# Patient Record
Sex: MALE | Race: WHITE | NOT HISPANIC OR LATINO | Employment: UNEMPLOYED | URBAN - NONMETROPOLITAN AREA
[De-identification: names, ages, dates, MRNs, and addresses within clinical notes are randomized per-mention and may not be internally consistent; named-entity substitution may affect disease eponyms.]

---

## 2018-02-28 RX ORDER — CEPHALEXIN 500 MG/1
500 CAPSULE ORAL 2 TIMES DAILY
COMMUNITY
End: 2021-04-02

## 2018-02-28 RX ORDER — BUPRENORPHINE HYDROCHLORIDE AND NALOXONE HYDROCHLORIDE DIHYDRATE 8; 2 MG/1; MG/1
2 TABLET SUBLINGUAL DAILY
COMMUNITY

## 2018-02-28 RX ORDER — SULFAMETHOXAZOLE AND TRIMETHOPRIM 800; 160 MG/1; MG/1
1 TABLET ORAL 2 TIMES DAILY
COMMUNITY
End: 2021-04-02

## 2018-03-06 ENCOUNTER — TELEPHONE (OUTPATIENT)
Dept: SURGERY | Facility: CLINIC | Age: 48
End: 2018-03-06

## 2018-03-06 NOTE — TELEPHONE ENCOUNTER
Patient no showed for his appointment with Dr Conway. Called patient no answer. Called PCP office spoke with Kina and told him patient no showed for appointment.Called patient phone  Family member answered and rescheduled appointment.

## 2018-03-15 ENCOUNTER — OFFICE VISIT (OUTPATIENT)
Dept: SURGERY | Facility: CLINIC | Age: 48
End: 2018-03-15

## 2018-03-15 VITALS
TEMPERATURE: 97.5 F | WEIGHT: 280 LBS | HEART RATE: 122 BPM | SYSTOLIC BLOOD PRESSURE: 124 MMHG | BODY MASS INDEX: 39.2 KG/M2 | DIASTOLIC BLOOD PRESSURE: 71 MMHG | HEIGHT: 71 IN | OXYGEN SATURATION: 98 %

## 2018-03-15 DIAGNOSIS — R10.13 EPIGASTRIC PAIN: ICD-10-CM

## 2018-03-15 DIAGNOSIS — K21.00 GASTROESOPHAGEAL REFLUX DISEASE WITH ESOPHAGITIS: Primary | ICD-10-CM

## 2018-03-15 PROCEDURE — 99203 OFFICE O/P NEW LOW 30 MIN: CPT | Performed by: SURGERY

## 2018-03-15 RX ORDER — GABAPENTIN 800 MG/1
800 TABLET ORAL 2 TIMES DAILY
COMMUNITY
Start: 2018-03-13

## 2018-03-15 RX ORDER — CHLORTHALIDONE 50 MG/1
50 TABLET ORAL DAILY
COMMUNITY
Start: 2018-03-03

## 2018-03-15 RX ORDER — LISINOPRIL 20 MG/1
TABLET ORAL
COMMUNITY
Start: 2018-02-20 | End: 2021-04-16 | Stop reason: ALTCHOICE

## 2018-03-15 NOTE — PROGRESS NOTES
Patient: Timbo Mckeon    YOB: 1970    Date: 03/15/2018    Primary Care Provider: Shayna Goddard NP    Reason for Consultation:Epigastric pain, GERD    Chief Complaint   Patient presents with   • Nausea     Nausea and vomiting       Subjective .     History of present illness:  I saw the patient in the office  today as a consultation for evaluation and treatment of nausea and vomiting.Patient also smokes cigarettes and takes a lot of caffeine.  No significant improvement with open up as all twice a day.  No previous EGD in the last 10 years.  Patient denies rectal bleeding or dark tarry stools.  No weight loss or anemia.  Nausea and vomiting only rarely with certain foods.    The following portions of the patient's history were reviewed and updated as appropriate: allergies, current medications, past family history, past medical history, past social history, past surgical history and problem list.      Review of Systems   Constitutional: Negative for chills, fever and unexpected weight change.   HENT: Negative for trouble swallowing and voice change.    Eyes: Negative for visual disturbance.   Respiratory: Negative for apnea, cough, chest tightness, shortness of breath and wheezing.    Cardiovascular: Negative for chest pain, palpitations and leg swelling.   Gastrointestinal: Positive for nausea and vomiting. Negative for abdominal distention, abdominal pain, anal bleeding, blood in stool, constipation, diarrhea and rectal pain.   Endocrine: Negative for cold intolerance and heat intolerance.   Genitourinary: Negative for difficulty urinating, dysuria, flank pain, scrotal swelling and testicular pain.   Musculoskeletal: Negative for back pain, gait problem and joint swelling.   Skin: Negative for color change, rash and wound.   Neurological: Negative for dizziness, syncope, speech difficulty, weakness, numbness and headaches.   Hematological: Negative for adenopathy. Does not bruise/bleed  easily.   Psychiatric/Behavioral: Negative for confusion. The patient is not nervous/anxious.        History:  Past Medical History:   Diagnosis Date   • Chronic back pain        Past Surgical History:   Procedure Laterality Date   • ANKLE SURGERY Right    • BACK SURGERY  2006   • FOOT SURGERY Right    • SPINE SURGERY         Family History   Problem Relation Age of Onset   • Diabetes Father    • Hypertension Father        Social History   Substance Use Topics   • Smoking status: Current Every Day Smoker   • Smokeless tobacco: Never Used   • Alcohol use No       Allergies:  No Known Allergies    Medications:    Current Outpatient Prescriptions:   •  ADVAIR DISKUS 250-50 MCG/DOSE DISKUS, , Disp: , Rfl:   •  buprenorphine-naloxone (SUBOXONE) 8-2 MG per SL tablet, Place 1 tablet under the tongue Daily., Disp: , Rfl:   •  cephalexin (KEFLEX) 500 MG capsule, Take 500 mg by mouth 2 (Two) Times a Day., Disp: , Rfl:   •  chlorthalidone (HYGROTEN) 50 MG tablet, , Disp: , Rfl:   •  DULERA 100-5 MCG/ACT inhaler, , Disp: , Rfl:   •  gabapentin (NEURONTIN) 800 MG tablet, , Disp: , Rfl:   •  lisinopril (PRINIVIL,ZESTRIL) 20 MG tablet, , Disp: , Rfl:   •  sulfamethoxazole-trimethoprim (BACTRIM DS,SEPTRA DS) 800-160 MG per tablet, Take 1 tablet by mouth 2 (Two) Times a Day., Disp: , Rfl:     Objective     Vital Signs:        Physical Exam:   General Appearance:    Alert, cooperative, in no acute distress   Head:    Normocephalic, without obvious abnormality, atraumatic   Eyes:            Lids and lashes normal, conjunctivae and sclerae normal, no   icterus, no pallor, corneas clear, PERRLA   Ears:    Ears appear intact with no abnormalities noted   Throat:   No oral lesions, no thrush, oral mucosa moist   Neck:   No adenopathy, supple, trachea midline, no thyromegaly, no   carotid bruit, no JVD   Lungs:     Clear to auscultation,respirations regular, even and                  unlabored    Heart:    Regular rhythm and normal rate,  normal S1 and S2, no            murmur, no gallop, no rub, no click   Chest Wall:    No abnormalities observed   Abdomen:     Normal bowel sounds, no masses, no organomegaly, soft        non-tender, non-distended, no guarding, there is evidence of epigastric  tenderness   Extremities:   Moves all extremities well, no edema, no cyanosis, no             redness   Pulses:   Pulses palpable and equal bilaterally   Skin:   No bleeding, bruising or rash   Lymph nodes:   No palpable adenopathy   Neurologic:   Cranial nerves 2 - 12 grossly intact, sensation intact     Results Review:   I reviewed the patient's new clinical results.    Review of Systems was reviewed and confirmed as accurate today.    Assessment/Plan     1. Gastroesophageal reflux disease with esophagitis    2. Epigastric pain        I did have a detailed and extensive discussion with the patient in the office and they understand that they need to undergo upper endoscopy. Full risks and benefits of operative versus nonoperative intervention were discussed with the patient and these include bleeding and esophageal injury. The patient understands, agrees, and wishes to proceed with the surgical treatment plan as mentioned above. The patient had no questions for me at the end of the discussion.       I discussed the patients findings and my recommendations with patient.     Electronically signed by Albert Conway MD  03/16/18 11:38 AM    Scribed for Albert Conway MD by Michelle Milligan. 3/16/2018  1:32 PM

## 2018-03-22 ENCOUNTER — OUTSIDE FACILITY SERVICE (OUTPATIENT)
Dept: SURGERY | Facility: CLINIC | Age: 48
End: 2018-03-22

## 2018-03-22 PROCEDURE — 43239 EGD BIOPSY SINGLE/MULTIPLE: CPT | Performed by: SURGERY

## 2018-04-24 ENCOUNTER — TELEPHONE (OUTPATIENT)
Dept: SURGERY | Facility: CLINIC | Age: 48
End: 2018-04-24

## 2018-04-24 NOTE — TELEPHONE ENCOUNTER
Patient has missed several appt to follow up on EGD I rescheduled him at  05/03/2018 he did not have his gastric emptying study done.  He stated since he started eating pickles he has been better

## 2018-04-30 ENCOUNTER — HOSPITAL ENCOUNTER (OUTPATIENT)
Dept: OTHER | Age: 48
Discharge: HOME | End: 2018-04-30

## 2018-04-30 LAB
A/G RATIO: 1.6 (ref 0.8–2)
ALBUMIN SERPL-MCNC: 4.7 G/DL (ref 3.4–4.8)
ALP BLD-CCNC: 121 U/L (ref 25–100)
ALT SERPL-CCNC: 47 U/L (ref 4–36)
ANION GAP SERPL CALCULATED.3IONS-SCNC: 14 MMOL/L (ref 3–16)
AST SERPL-CCNC: 34 U/L (ref 8–33)
BILIRUB SERPL-MCNC: 0.3 MG/DL (ref 0.3–1.2)
BUN BLDV-MCNC: 10 MG/DL (ref 6–20)
CALCIUM SERPL-MCNC: 9.4 MG/DL (ref 8.5–10.5)
CHLORIDE BLD-SCNC: 96 MMOL/L (ref 98–107)
CO2: 26 MMOL/L (ref 20–30)
CREAT SERPL-MCNC: 0.7 MG/DL (ref 0.4–1.2)
GFR AFRICAN AMERICAN: >59
GFR NON-AFRICAN AMERICAN: >59
GLOBULIN: 2.9 G/DL
GLUCOSE BLD-MCNC: 111 MG/DL (ref 74–106)
HCT VFR BLD CALC: 48 % (ref 40–54)
HEMOGLOBIN: 16 G/DL (ref 13–18)
MCH RBC QN AUTO: 29.7 PG (ref 27–32)
MCHC RBC AUTO-ENTMCNC: 33.3 G/DL (ref 31–35)
MCV RBC AUTO: 89.2 FL (ref 80–100)
PDW BLD-RTO: 13.5 % (ref 11–16)
PLATELET # BLD: 249 K/UL (ref 150–400)
PMV BLD AUTO: 10.1 FL (ref 6–10)
POTASSIUM SERPL-SCNC: 4.5 MMOL/L (ref 3.4–5.1)
RBC # BLD: 5.38 M/UL (ref 4.5–6)
SODIUM BLD-SCNC: 136 MMOL/L (ref 136–145)
TOTAL PROTEIN: 7.6 G/DL (ref 6.4–8.3)
WBC # BLD: 8.9 K/UL (ref 4–11)

## 2018-05-02 LAB
HAV IGM SER IA-ACNC: ABNORMAL
HEPATITIS B CORE IGM ANTIBODY: ABNORMAL
HEPATITIS B SURFACE ANTIGEN INTERPRETATION: ABNORMAL
HEPATITIS C ANTIBODY INTERPRETATION: REACTIVE
HIV AG/AB: NORMAL
HIV ANTIGEN: NORMAL
HIV-1 ANTIBODY: NORMAL
HIV-2 AB: NORMAL

## 2018-05-07 ENCOUNTER — TELEPHONE (OUTPATIENT)
Dept: SURGERY | Facility: CLINIC | Age: 48
End: 2018-05-07

## 2018-05-07 NOTE — TELEPHONE ENCOUNTER
Patient has no showed for several appointments. Per Dr. Conway do not reschedule. Does not want to discharge patient but wishes to not schedule.

## 2019-02-25 ENCOUNTER — HOSPITAL ENCOUNTER (EMERGENCY)
Facility: HOSPITAL | Age: 49
Discharge: HOME | End: 2019-02-25
Attending: EMERGENCY MEDICINE
Payer: COMMERCIAL

## 2019-02-25 VITALS
HEIGHT: 70 IN | DIASTOLIC BLOOD PRESSURE: 77 MMHG | OXYGEN SATURATION: 97 % | TEMPERATURE: 98.3 F | WEIGHT: 250 LBS | BODY MASS INDEX: 35.79 KG/M2 | RESPIRATION RATE: 20 BRPM | SYSTOLIC BLOOD PRESSURE: 133 MMHG | HEART RATE: 115 BPM

## 2019-02-25 DIAGNOSIS — L03.119 CELLULITIS AND ABSCESS OF LEG: Primary | ICD-10-CM

## 2019-02-25 DIAGNOSIS — L02.419 CELLULITIS AND ABSCESS OF LEG: Primary | ICD-10-CM

## 2019-02-25 PROCEDURE — 6370000000 HC RX 637 (ALT 250 FOR IP): Performed by: EMERGENCY MEDICINE

## 2019-02-25 PROCEDURE — 99282 EMERGENCY DEPT VISIT SF MDM: CPT

## 2019-02-25 RX ORDER — RANITIDINE 300 MG/1
300 TABLET ORAL NIGHTLY
COMMUNITY
End: 2020-12-12

## 2019-02-25 RX ORDER — CEPHALEXIN 500 MG/1
500 CAPSULE ORAL 4 TIMES DAILY
Qty: 28 CAPSULE | Refills: 0 | Status: SHIPPED | OUTPATIENT
Start: 2019-02-25 | End: 2019-03-04

## 2019-02-25 RX ORDER — SULFAMETHOXAZOLE AND TRIMETHOPRIM 800; 160 MG/1; MG/1
2 TABLET ORAL 2 TIMES DAILY
Qty: 28 TABLET | Refills: 0 | Status: SHIPPED | OUTPATIENT
Start: 2019-02-25 | End: 2019-03-04

## 2019-02-25 RX ORDER — SULFAMETHOXAZOLE AND TRIMETHOPRIM 800; 160 MG/1; MG/1
2 TABLET ORAL ONCE
Status: COMPLETED | OUTPATIENT
Start: 2019-02-25 | End: 2019-02-25

## 2019-02-25 RX ADMIN — SULFAMETHOXAZOLE AND TRIMETHOPRIM 2 TABLET: 800; 160 TABLET ORAL at 11:08

## 2019-02-25 ASSESSMENT — PAIN SCALES - GENERAL: PAINLEVEL_OUTOF10: 7

## 2019-02-25 ASSESSMENT — PAIN DESCRIPTION - ORIENTATION: ORIENTATION: RIGHT;POSTERIOR

## 2019-02-25 ASSESSMENT — PAIN DESCRIPTION - PROGRESSION: CLINICAL_PROGRESSION: GRADUALLY WORSENING

## 2019-02-25 ASSESSMENT — PAIN DESCRIPTION - LOCATION: LOCATION: KNEE

## 2019-02-25 ASSESSMENT — PAIN DESCRIPTION - FREQUENCY: FREQUENCY: CONTINUOUS

## 2019-02-25 ASSESSMENT — PAIN DESCRIPTION - DESCRIPTORS: DESCRIPTORS: ACHING

## 2019-02-25 ASSESSMENT — PAIN DESCRIPTION - PAIN TYPE: TYPE: ACUTE PAIN

## 2019-03-20 ENCOUNTER — PROCEDURE VISIT (OUTPATIENT)
Dept: ORTHOPEDIC SURGERY | Facility: CLINIC | Age: 49
End: 2019-03-20

## 2019-03-20 DIAGNOSIS — M54.17 LUMBOSACRAL NEURITIS: ICD-10-CM

## 2019-03-20 DIAGNOSIS — G62.9 PERIPHERAL NERVE DISORDER: Primary | ICD-10-CM

## 2019-03-20 DIAGNOSIS — R20.2 PARESTHESIA: ICD-10-CM

## 2019-03-20 PROCEDURE — 95886 MUSC TEST DONE W/N TEST COMP: CPT | Performed by: PHYSICAL THERAPIST

## 2019-03-20 PROCEDURE — 95913 NRV CNDJ TEST 13/> STUDIES: CPT | Performed by: PHYSICAL THERAPIST

## 2019-05-06 ENCOUNTER — APPOINTMENT (OUTPATIENT)
Dept: GENERAL RADIOLOGY | Facility: HOSPITAL | Age: 49
DRG: 603 | End: 2019-05-06
Payer: COMMERCIAL

## 2019-05-06 ENCOUNTER — HOSPITAL ENCOUNTER (EMERGENCY)
Facility: HOSPITAL | Age: 49
Discharge: HOME | DRG: 603 | End: 2019-05-06
Attending: FAMILY MEDICINE
Payer: COMMERCIAL

## 2019-05-06 VITALS
OXYGEN SATURATION: 95 % | HEART RATE: 76 BPM | TEMPERATURE: 97.2 F | BODY MASS INDEX: 40.66 KG/M2 | SYSTOLIC BLOOD PRESSURE: 106 MMHG | WEIGHT: 284 LBS | DIASTOLIC BLOOD PRESSURE: 72 MMHG | HEIGHT: 70 IN | RESPIRATION RATE: 16 BRPM

## 2019-05-06 DIAGNOSIS — L03.114 CELLULITIS OF LEFT UPPER EXTREMITY: ICD-10-CM

## 2019-05-06 DIAGNOSIS — S60.222A CONTUSION OF LEFT HAND, INITIAL ENCOUNTER: Primary | ICD-10-CM

## 2019-05-06 PROCEDURE — 73130 X-RAY EXAM OF HAND: CPT

## 2019-05-06 PROCEDURE — 99283 EMERGENCY DEPT VISIT LOW MDM: CPT

## 2019-05-06 PROCEDURE — 6360000002 HC RX W HCPCS: Performed by: FAMILY MEDICINE

## 2019-05-06 PROCEDURE — 90471 IMMUNIZATION ADMIN: CPT | Performed by: FAMILY MEDICINE

## 2019-05-06 PROCEDURE — 90715 TDAP VACCINE 7 YRS/> IM: CPT | Performed by: FAMILY MEDICINE

## 2019-05-06 RX ORDER — SULFAMETHOXAZOLE AND TRIMETHOPRIM 800; 160 MG/1; MG/1
1 TABLET ORAL 2 TIMES DAILY
Qty: 14 TABLET | Refills: 0 | Status: ON HOLD | OUTPATIENT
Start: 2019-05-06 | End: 2019-05-11 | Stop reason: HOSPADM

## 2019-05-06 RX ORDER — DOXYCYCLINE HYCLATE 100 MG
100 TABLET ORAL 2 TIMES DAILY
Qty: 20 TABLET | Refills: 0 | Status: ON HOLD | OUTPATIENT
Start: 2019-05-06 | End: 2019-05-11 | Stop reason: HOSPADM

## 2019-05-06 RX ADMIN — TETANUS TOXOID, REDUCED DIPHTHERIA TOXOID AND ACELLULAR PERTUSSIS VACCINE, ADSORBED 0.5 ML: 5; 2.5; 8; 8; 2.5 SUSPENSION INTRAMUSCULAR at 13:49

## 2019-05-06 ASSESSMENT — PAIN SCALES - GENERAL: PAINLEVEL_OUTOF10: 9

## 2019-05-06 ASSESSMENT — PAIN DESCRIPTION - ORIENTATION: ORIENTATION: LEFT

## 2019-05-06 ASSESSMENT — PAIN DESCRIPTION - PAIN TYPE: TYPE: ACUTE PAIN

## 2019-05-06 ASSESSMENT — PAIN DESCRIPTION - FREQUENCY: FREQUENCY: CONTINUOUS

## 2019-05-06 ASSESSMENT — PAIN DESCRIPTION - DESCRIPTORS: DESCRIPTORS: CONSTANT

## 2019-05-06 ASSESSMENT — ENCOUNTER SYMPTOMS: COLOR CHANGE: 1

## 2019-05-06 ASSESSMENT — PAIN DESCRIPTION - LOCATION: LOCATION: HAND

## 2019-05-06 ASSESSMENT — PAIN DESCRIPTION - ONSET: ONSET: GRADUAL

## 2019-05-06 ASSESSMENT — PAIN DESCRIPTION - PROGRESSION: CLINICAL_PROGRESSION: GRADUALLY WORSENING

## 2019-05-06 NOTE — ED TRIAGE NOTES
Pt arrival to ER with complaints of left hand pain after hitting himself with a hammer yesterday. Pt has noted swelling to the left thumb.

## 2019-05-06 NOTE — ED NOTES
Reviewed discharge plan with Eliza Whalen. Encouraged him to f/u with Sheri Motley MD and he understood. NAD noted on discharge, gait steady. Reviewed discharge prescription for bactrim and doxycycline. Deepali Mathias states understanding of how and when to take medications.       Electronically signed by Ronnie Aschoff, RN on 5/6/2019 at [address] [place] [address]  05/06/19 1421

## 2019-05-06 NOTE — ED PROVIDER NOTES
[address] [place] [address] Court  eMERGENCY dEPARTMENT eNCOUnter      Pt Name: Anne Barrett  MRN: [id number]  [place] 1970  Date of evaluation: 5/6/2019  Provider: Albert Hernandez MD    [address] [place] [address]       Chief Complaint   Patient presents with    Hand Injury         HISTORY OF PRESENT ILLNESS   (Location/Symptom, Timing/Onset, Context/Setting, Quality, Duration, Modifying Factors, Severity)  Note limiting factors. Anne Barrett is a 54 y.o. male who presents to the emergency department with pain and swelling in the left hand. Patient states that he hit it with a hammer while he was working yesterday and it swelled up. Then he stuck a pin in the area of swelling to try and drain it. Now it is not only swollen but red and hot. Nursing Notes were reviewed. REVIEW OF SYSTEMS    (2-9 systems for level 4, 10 or more forlevel 5)     Review of Systems   Constitutional: Negative for chills and fever. Musculoskeletal: Positive for arthralgias and joint swelling. Skin: Positive for color change and wound. Except as noted above the remainder of the review of systems was reviewed and negative. PAST MEDICAL HISTORY     Past Medical History:   Diagnosis Date    Chronic back pain     Hypertension     IV drug abuse ([facility] [address] 2011    7 years clean         SURGICAL HISTORY       Past Surgical History:   Procedure Laterality Date    BACK SURGERY  2006    FOOT SURGERY Right     FRACTURE SURGERY Right     ankle    SPINE SURGERY           CURRENT MEDICATIONS       Previous Medications    BUPRENORPHINE-NALOXONE (SUBOXONE) 8-2 MG SUBL SL TABLET    Place 1 tablet under the tongue daily. Lord Jaramillo FLUTICASONE-SALMETEROL (ADVAIR) 250-50 MCG/DOSE AEPB    Inhale 1 puff into the lungs every 12 hours    GABAPENTIN (NEURONTIN) 600 MG TABLET    Take 600 mg by mouth 2 times daily.      LISINOPRIL (PRINIVIL;ZESTRIL) 20 MG TABLET    Take 20 mg by mouth daily    RANITIDINE (ZANTAC) 300 MG TABLET Take 300 mg by mouth nightly       ALLERGIES     Patient has no known allergies. FAMILY HISTORY       Family History   Problem Relation Age of Onset    High Blood Pressure Father     Diabetes Father           SOCIAL HISTORY       Social History     Socioeconomic History    Marital status: Single     Spouse name: None    Number of children: None    Years of education: None    Highest education level: None   Occupational History    None   Social Needs    Financial resource strain: None    Food insecurity:     Worry: None     Inability: None    Transportation needs:     Medical: None     Non-medical: None   Tobacco Use    Smoking status: Current Every Day Smoker     Packs/day: 1.00     Years: 8.00     Pack years: 8.00     Types: Cigarettes    Smokeless tobacco: Current User     Types: Snuff   Substance and Sexual Activity    Alcohol use: Yes     Comment: occ    Drug use: No    Sexual activity: None   Lifestyle    Physical activity:     Days per week: None     Minutes per session: None    Stress: None   Relationships    Social connections:     Talks on phone: None     Gets together: None     Attends Cheondoism service: None     Active member of club or organization: None     Attends meetings of clubs or organizations: None     Relationship status: None    Intimate partner violence:     Fear of current or ex partner: None     Emotionally abused: None     Physically abused: None     Forced sexual activity: None   Other Topics Concern    None   Social History Narrative    None       SCREENINGS             PHYSICAL EXAM    (up to 7 for level 4, 8 or more for level 5)     ED Triage Vitals [05/06/19 1231]   BP Temp Temp Source Pulse Resp SpO2 Height Weight   106/72 97.2 °F (36.2 °C) Oral 76 16 95 % 5' 10\" (1.778 m) 284 lb (128.8 kg)       Physical Exam   Constitutional: He is oriented to person, place, and time. He appears well-developed and well-nourished.    Neurological: He is alert and oriented to Discharge 05/06/2019 01:42:48 PM      PATIENT REFERRED TO:  [facility] Emergency Department  [address]   [facility]  [phone]  In 2 days  If symptoms worsen      DISCHARGE MEDICATIONS:  New Prescriptions    DOXYCYCLINE HYCLATE (VIBRA-TABS) 100 MG TABLET    Take 1 tablet by mouth 2 times daily for 10 days    SULFAMETHOXAZOLE-TRIMETHOPRIM (BACTRIM DS;SEPTRA DS) 800-160 MG PER TABLET    Take 1 tablet by mouth 2 times daily for 7 days       (Please note that portions ofthis note were completed with a voice recognition program.  Efforts were made to edit the dictations but occasionally words are mis-transcribed.)    Mable Byers MD(electronically signed)  Attending Emergency Physician          Mable Byers MD  05/06/19 7838

## 2019-05-09 ENCOUNTER — HOSPITAL ENCOUNTER (INPATIENT)
Facility: HOSPITAL | Age: 49
LOS: 2 days | Discharge: HOME | DRG: 603 | End: 2019-05-11
Attending: EMERGENCY MEDICINE | Admitting: INTERNAL MEDICINE
Payer: COMMERCIAL

## 2019-05-09 ENCOUNTER — APPOINTMENT (OUTPATIENT)
Dept: CT IMAGING | Facility: HOSPITAL | Age: 49
DRG: 603 | End: 2019-05-09
Payer: COMMERCIAL

## 2019-05-09 DIAGNOSIS — L03.114 CELLULITIS OF HAND, LEFT: Primary | ICD-10-CM

## 2019-05-09 LAB
A/G RATIO: 1.3 (ref 0.8–2)
ALBUMIN SERPL-MCNC: 4.4 G/DL (ref 3.4–4.8)
ALP BLD-CCNC: 96 U/L (ref 25–100)
ALT SERPL-CCNC: 28 U/L (ref 4–36)
ANION GAP SERPL CALCULATED.3IONS-SCNC: 13 MMOL/L (ref 3–16)
AST SERPL-CCNC: 26 U/L (ref 8–33)
BASOPHILS ABSOLUTE: 0.1 K/UL (ref 0–0.1)
BASOPHILS RELATIVE PERCENT: 0.4 %
BILIRUB SERPL-MCNC: 0.4 MG/DL (ref 0.3–1.2)
BUN BLDV-MCNC: 10 MG/DL (ref 6–20)
CALCIUM SERPL-MCNC: 10.1 MG/DL (ref 8.5–10.5)
CHLORIDE BLD-SCNC: 95 MMOL/L (ref 98–107)
CO2: 26 MMOL/L (ref 20–30)
CREAT SERPL-MCNC: 0.8 MG/DL (ref 0.4–1.2)
EOSINOPHILS ABSOLUTE: 0.4 K/UL (ref 0–0.4)
EOSINOPHILS RELATIVE PERCENT: 2 %
GFR AFRICAN AMERICAN: >59
GFR NON-AFRICAN AMERICAN: >59
GLOBULIN: 3.3 G/DL
GLUCOSE BLD-MCNC: 104 MG/DL (ref 74–106)
HCT VFR BLD CALC: 45.7 % (ref 40–54)
HEMOGLOBIN: 15.7 G/DL (ref 13–18)
IMMATURE GRANULOCYTES #: 0.1 K/UL
IMMATURE GRANULOCYTES %: 0.6 % (ref 0–5)
LYMPHOCYTES ABSOLUTE: 2.4 K/UL (ref 1.5–4)
LYMPHOCYTES RELATIVE PERCENT: 13.1 %
MCH RBC QN AUTO: 30.5 PG (ref 27–32)
MCHC RBC AUTO-ENTMCNC: 34.4 G/DL (ref 31–35)
MCV RBC AUTO: 88.9 FL (ref 80–100)
MONOCYTES ABSOLUTE: 1.7 K/UL (ref 0.2–0.8)
MONOCYTES RELATIVE PERCENT: 9.4 %
NEUTROPHILS ABSOLUTE: 13.5 K/UL (ref 2–7.5)
NEUTROPHILS RELATIVE PERCENT: 74.5 %
PDW BLD-RTO: 13.2 % (ref 11–16)
PLATELET # BLD: 282 K/UL (ref 150–400)
PMV BLD AUTO: 10.5 FL (ref 6–10)
POTASSIUM SERPL-SCNC: 3.7 MMOL/L (ref 3.4–5.1)
RBC # BLD: 5.14 M/UL (ref 4.5–6)
SODIUM BLD-SCNC: 134 MMOL/L (ref 136–145)
TOTAL PROTEIN: 7.7 G/DL (ref 6.4–8.3)
WBC # BLD: 18.1 K/UL (ref 4–11)

## 2019-05-09 PROCEDURE — 36415 COLL VENOUS BLD VENIPUNCTURE: CPT

## 2019-05-09 PROCEDURE — 73201 CT UPPER EXTREMITY W/DYE: CPT

## 2019-05-09 PROCEDURE — 2500000003 HC RX 250 WO HCPCS: Performed by: EMERGENCY MEDICINE

## 2019-05-09 PROCEDURE — 80053 COMPREHEN METABOLIC PANEL: CPT

## 2019-05-09 PROCEDURE — 1200000000 HC SEMI PRIVATE

## 2019-05-09 PROCEDURE — 94760 N-INVAS EAR/PLS OXIMETRY 1: CPT

## 2019-05-09 PROCEDURE — 87040 BLOOD CULTURE FOR BACTERIA: CPT

## 2019-05-09 PROCEDURE — 85025 COMPLETE CBC W/AUTO DIFF WBC: CPT

## 2019-05-09 PROCEDURE — 6360000004 HC RX CONTRAST MEDICATION: Performed by: EMERGENCY MEDICINE

## 2019-05-09 PROCEDURE — 99285 EMERGENCY DEPT VISIT HI MDM: CPT

## 2019-05-09 RX ORDER — SODIUM CHLORIDE 0.9 % (FLUSH) 0.9 %
10 SYRINGE (ML) INJECTION EVERY 12 HOURS SCHEDULED
Status: DISCONTINUED | OUTPATIENT
Start: 2019-05-09 | End: 2019-05-11 | Stop reason: HOSPADM

## 2019-05-09 RX ORDER — GABAPENTIN 300 MG/1
600 CAPSULE ORAL 2 TIMES DAILY
Status: DISCONTINUED | OUTPATIENT
Start: 2019-05-10 | End: 2019-05-11 | Stop reason: HOSPADM

## 2019-05-09 RX ORDER — ONDANSETRON 2 MG/ML
4 INJECTION INTRAMUSCULAR; INTRAVENOUS EVERY 6 HOURS PRN
Status: DISCONTINUED | OUTPATIENT
Start: 2019-05-09 | End: 2019-05-11 | Stop reason: HOSPADM

## 2019-05-09 RX ORDER — BUPRENORPHINE HYDROCHLORIDE AND NALOXONE HYDROCHLORIDE DIHYDRATE 8; 2 MG/1; MG/1
1 TABLET SUBLINGUAL DAILY
Status: DISCONTINUED | OUTPATIENT
Start: 2019-05-10 | End: 2019-05-11 | Stop reason: HOSPADM

## 2019-05-09 RX ORDER — FAMOTIDINE 20 MG/1
20 TABLET, FILM COATED ORAL 2 TIMES DAILY
Status: DISCONTINUED | OUTPATIENT
Start: 2019-05-09 | End: 2019-05-11 | Stop reason: HOSPADM

## 2019-05-09 RX ORDER — SODIUM CHLORIDE 0.9 % (FLUSH) 0.9 %
10 SYRINGE (ML) INJECTION PRN
Status: DISCONTINUED | OUTPATIENT
Start: 2019-05-09 | End: 2019-05-11 | Stop reason: HOSPADM

## 2019-05-09 RX ORDER — CHLORTHALIDONE 25 MG/1
50 TABLET ORAL DAILY
Status: DISCONTINUED | OUTPATIENT
Start: 2019-05-10 | End: 2019-05-11 | Stop reason: HOSPADM

## 2019-05-09 RX ORDER — LIDOCAINE HYDROCHLORIDE AND EPINEPHRINE 10; 10 MG/ML; UG/ML
20 INJECTION, SOLUTION INFILTRATION; PERINEURAL ONCE
Status: DISCONTINUED | OUTPATIENT
Start: 2019-05-09 | End: 2019-05-09

## 2019-05-09 RX ORDER — CLINDAMYCIN PHOSPHATE 600 MG/50ML
600 INJECTION INTRAVENOUS EVERY 6 HOURS
Status: DISCONTINUED | OUTPATIENT
Start: 2019-05-09 | End: 2019-05-09

## 2019-05-09 RX ORDER — LISINOPRIL 20 MG/1
20 TABLET ORAL DAILY
Status: DISCONTINUED | OUTPATIENT
Start: 2019-05-10 | End: 2019-05-11 | Stop reason: HOSPADM

## 2019-05-09 RX ORDER — CHLORTHALIDONE 50 MG/1
50 TABLET ORAL DAILY
COMMUNITY
End: 2022-04-03

## 2019-05-09 RX ADMIN — IOPAMIDOL 100 ML: 755 INJECTION, SOLUTION INTRAVENOUS at 19:26

## 2019-05-09 RX ADMIN — CLINDAMYCIN PHOSPHATE 600 MG: 600 INJECTION, SOLUTION INTRAVENOUS at 20:56

## 2019-05-09 ASSESSMENT — ENCOUNTER SYMPTOMS
EYE REDNESS: 0
STRIDOR: 0
HEMATEMESIS: 0
COUGH: 0
SPUTUM PRODUCTION: 0
ABDOMINAL PAIN: 0
WHEEZING: 0
BACK PAIN: 0
NAUSEA: 0
DIARRHEA: 0
EYE DISCHARGE: 0
VOMITING: 0

## 2019-05-09 ASSESSMENT — PAIN DESCRIPTION - FREQUENCY: FREQUENCY: CONTINUOUS

## 2019-05-09 ASSESSMENT — PAIN DESCRIPTION - ORIENTATION: ORIENTATION: LEFT

## 2019-05-09 ASSESSMENT — PAIN SCALES - GENERAL: PAINLEVEL_OUTOF10: 10

## 2019-05-09 ASSESSMENT — PAIN DESCRIPTION - PAIN TYPE: TYPE: ACUTE PAIN

## 2019-05-09 ASSESSMENT — PAIN DESCRIPTION - LOCATION: LOCATION: HAND

## 2019-05-09 ASSESSMENT — PAIN DESCRIPTION - DESCRIPTORS: DESCRIPTORS: ACHING

## 2019-05-09 NOTE — ED NOTES
Attempted lab draw x2. Pt requested [place] to come draw labs. Carlos Enrique Signs stated it will be a few minutes before she can get down here. Dr. Shivani Dominguez aware.       Alyce Martin RN  05/09/19 4610

## 2019-05-09 NOTE — ED PROVIDER NOTES
[address] [facility] ENCOUNTER      Pt Name: Matt Alan  MRN: [id number]  [place]: 1970  Date of evaluation: 5/9/2019  Provider: Eboni Erickson MD    CHIEF COMPLAINT       Chief Complaint   Patient presents with    Wound Check     Pt was seen here Monday for his hand. Was given 2 antibiotics (doxycyline and Bactrim). Pt states it has gotten more red and now has streaks running though it. Pain has also increased. Wound was caused by hammer. HISTORY OF PRESENT ILLNESS  (Location/Symptom, Timing/Onset, Context/Setting, Quality, Duration, Modifying Factors, Severity.)   Matt Alan is a 54 y.o. male who presents to the emergency department who returns for re exam of left hand wound. States seen here 3 days ago post hitting his left hand with a hammer. Had x-rays done and no fracgture. Taking doxycycline and ?trimethaprim sulfa. Returns with [place] and swelling left hand in area of injury. No feevr or chills. He is not diabetic. He is ight handed. Had tetanus toxoid at time of originl visit. Nursing notes were reviewed. REVIEW OFSYSTEMS    (2-9 systems for level 4, 10 or more for level 5)   ROS:  Review of Systems   Constitution: Negative for chills, decreased appetite, diaphoresis and fever. HENT: Negative for congestion, nosebleeds and stridor. Eyes: Negative for discharge and redness. Cardiovascular: Negative for chest pain. Respiratory: Negative for cough, sputum production and wheezing. Hematologic/Lymphatic: Negative for bleeding problem. Does not bruise/bleed easily. Skin: Negative for itching and rash. Musculoskeletal: Negative for back pain. Pain and swelling over thedorsum of right thumb. Can use although moderate pain, with ROM. Gastrointestinal: Negative for abdominal pain, diarrhea, hematemesis, nausea and vomiting. Genitourinary: Negative for dysuria and flank pain.    Neurological: Negative Non-medical: None   Tobacco Use    Smoking status: Current Every Day Smoker     Packs/day: 1.00     Years: 8.00     Pack years: 8.00     Types: Cigarettes    Smokeless tobacco: Current User     Types: Snuff   Substance and Sexual Activity    Alcohol use: Yes     Comment: occ    Drug use: No    Sexual activity: None   Lifestyle    Physical activity:     Days per week: None     Minutes per session: None    Stress: None   Relationships    Social connections:     Talks on phone: None     Gets together: None     Attends Rastafari service: None     Active member of club or organization: None     Attends meetings of clubs or organizations: None     Relationship status: None    Intimate partner violence:     Fear of current or ex partner: None     Emotionally abused: None     Physically abused: None     Forced sexual activity: None   Other Topics Concern    None   Social History Narrative    None         PHYSICAL EXAM    (up to 7 for level 4, 8 or more for level 5)     ED Triage Vitals [05/09/19 1433]   BP Temp Temp Source Pulse Resp SpO2 Height Weight   129/74 98.2 °F (36.8 °C) Oral 118 16 95 % 5' 10.5\" (1.791 m) 285 lb (129.3 kg)       Physical Exam   Constitutional: He is oriented to person, place, and time. No distress. obese   HENT:   Head: Normocephalic and atraumatic. Eyes: Pupils are equal, round, and reactive to light. EOM are normal.   Cardiovascular: Regular rhythm. tachycardia   Pulmonary/Chest: Effort normal and breath sounds normal.   Abdominal: Soft. Bowel sounds are normal.   Musculoskeletal:   Moderate  Swelling of left thenar emnence, first dorsal interosseous space anddorsum of thumb. Has ecchymosis at site of prior hammer blow. No deformity and has good ROM ofleft thumb with minimal tenderness. FROM all digits and circ and sensation intact. No apparent fluctuance. No tenderness or swelling of wrist.    Neurological: He is alert and oriented to person, place, and time.    Skin: Skin is dry. Capillary refill takes less than 2 seconds. No rash noted. He is not diaphoretic. There is erythema. No pallor. See above   Psychiatric: He has a normal mood and affect. His behavior is normal.   Nursing note and vitals reviewed. DIAGNOSTIC RESULTS     EKG: All EKG's are interpreted by the Emergency Department Physician who either signs or Co-signs this chart in the [address] a cardiologist.        RADIOLOGY:   Non-plain film images such as CT, Ultrasound and MRI are read by the radiologist. Plain radiographic images are visualized and preliminarily interpreted by the emergency physician with the below findings:      ? Radiologist's Report Reviewed:  CT HAND LEFT W CONTRAST   Final Result      1. Cellulitis and subcutaneous soft tissue swelling but no sign of any definable fluid collection or abscess a ring-enhancing fluid collection. 2. No bony fracture or periosteal reaction.  Normal-appearing wrist joint    Hand            ED BEDSIDE ULTRASOUND:   Performed by ED Physician - none    LABS:    I have reviewed and interpreted all of the currently available lab results from this visit (ifapplicable):  Results for orders placed or performed during the hospital encounter of 05/09/19   CBC Auto Differential   Result Value Ref Range    WBC 18.1 (H) 4.0 - 11.0 K/uL    RBC 5.14 4.50 - 6.00 M/uL    Hemoglobin 15.7 13.0 - 18.0 g/dL    Hematocrit 45.7 40.0 - 54.0 %    MCV 88.9 80.0 - 100.0 fL    MCH 30.5 27.0 - 32.0 pg    MCHC 34.4 31.0 - 35.0 g/dL    RDW 13.2 11.0 - 16.0 %    Platelets 664 729 - 598 K/uL    MPV 10.5 (H) 6.0 - 10.0 fL    Neutrophils % 74.5 %    Immature Granulocytes % 0.6 0.0 - 5.0 %    Lymphocytes % 13.1 %    Monocytes % 9.4 %    Eosinophils % 2.0 %    Basophils % 0.4 %    Neutrophils # 13.5 (H) 2.0 - 7.5 K/uL    Immature Granulocytes # 0.1 K/uL    Lymphocytes # 2.4 1.5 - 4.0 K/uL    Monocytes # 1.7 (H) 0.2 - 0.8 K/uL    Eosinophils # 0.4 0.0 - 0.4 K/uL    Basophils # 0.1 0.0 - 0.1 K/uL Comprehensive Metabolic Panel   Result Value Ref Range    Sodium 134 (L) 136 - 145 mmol/L    Potassium 3.7 3.4 - 5.1 mmol/L    Chloride 95 (L) 98 - 107 mmol/L    CO2 26 20 - 30 mmol/L    Anion Gap 13 3 - 16    Glucose 104 74 - 106 mg/dL    BUN 10 6 - 20 mg/dL    CREATININE 0.8 0.4 - 1.2 mg/dL    GFR Non-African American >59 >59    GFR African American >59 >59    Calcium 10.1 8.5 - 10.5 mg/dL    Total Protein 7.7 6.4 - 8.3 g/dL    Alb 4.4 3.4 - 4.8 g/dL    Albumin/Globulin Ratio 1.3 0.8 - 2.0    Total Bilirubin 0.4 0.3 - 1.2 mg/dL    Alkaline Phosphatase 96 25 - 100 U/L    ALT 28 4 - 36 U/L    AST 26 8 - 33 U/L    Globulin 3.3 g/dL        All other labs were within normal range or not returned as of this dictation. EMERGENCY DEPARTMENT COURSE and DIFFERENTIAL DIAGNOSIS/MDM:   Vitals:    Vitals:    05/09/19 1433   BP: 129/74   Pulse: 118   Resp: 16   Temp: 98.2 °F (36.8 °C)   TempSrc: Oral   SpO2: 95%   Weight: 285 lb (129.3 kg)   Height: 5' 10.5\" (1.791 m)   Discussed with , hand surgeon [facility],who states they are on divert unless surgical emergency. Do CT and if pus will accept in transfer and if no pus then admit here for antibiotics. CT without fluid collection. Discussed with patient who seems to understand, agree and is lucid. Discussed with Dr. Sacha Younger, Hosp;italist, who will admit. MEDICATIONS ADMINISTERED IN ED:  Medications   lidocaine-EPINEPHrine 1 percent-1:[id number] injection 20 mL ( Intradermal Canceled Entry 5/9/19 1928)   clindamycin (CLEOCIN) 600 mg in dextrose 5 % 50 mL IVPB (600 mg Intravenous New Bag 5/9/19 2056)   iopamidol (ISOVUE-370) 76 % injection 100 mL (100 mLs Intravenous Given 5/9/19 1926)       The patient will follow-up with their PCP in 1-2 days for reevaluation. If the patient or family members have anyfurther concerns or any worsening symptoms they will return to the ED for reevaluation.          CONSULTS:  None    PROCEDURES:  Procedures    CRITICAL CARE TIME

## 2019-05-09 NOTE — ED NOTES
Carlos Enrique Signs from lab to room to collect lab specimens     Katherine Quintero RN  05/09/19 4232

## 2019-05-09 NOTE — ED NOTES
97 Sahra Costello for pt transfer to [facility].       Yara Lopez RN  05/09/19 4664

## 2019-05-10 PROBLEM — F11.20 OPIOID DEPENDENCE (HCC): Status: ACTIVE | Noted: 2019-05-10

## 2019-05-10 PROBLEM — B18.2 CHRONIC HEPATITIS C WITHOUT HEPATIC COMA (HCC): Status: ACTIVE | Noted: 2019-05-10

## 2019-05-10 LAB
ANION GAP SERPL CALCULATED.3IONS-SCNC: 12 MMOL/L (ref 3–16)
BASOPHILS ABSOLUTE: 0.1 K/UL (ref 0–0.1)
BASOPHILS RELATIVE PERCENT: 0.5 %
BUN BLDV-MCNC: 10 MG/DL (ref 6–20)
CALCIUM SERPL-MCNC: 9.6 MG/DL (ref 8.5–10.5)
CHLORIDE BLD-SCNC: 95 MMOL/L (ref 98–107)
CO2: 29 MMOL/L (ref 20–30)
CREAT SERPL-MCNC: 0.8 MG/DL (ref 0.4–1.2)
EOSINOPHILS ABSOLUTE: 0.6 K/UL (ref 0–0.4)
EOSINOPHILS RELATIVE PERCENT: 4.3 %
GFR AFRICAN AMERICAN: >59
GFR NON-AFRICAN AMERICAN: >59
GLUCOSE BLD-MCNC: 124 MG/DL (ref 74–106)
HCT VFR BLD CALC: 45 % (ref 40–54)
HEMOGLOBIN: 15 G/DL (ref 13–18)
IMMATURE GRANULOCYTES #: 0.1 K/UL
IMMATURE GRANULOCYTES %: 0.8 % (ref 0–5)
LYMPHOCYTES ABSOLUTE: 2.9 K/UL (ref 1.5–4)
LYMPHOCYTES RELATIVE PERCENT: 22.6 %
MCH RBC QN AUTO: 29.8 PG (ref 27–32)
MCHC RBC AUTO-ENTMCNC: 33.3 G/DL (ref 31–35)
MCV RBC AUTO: 89.5 FL (ref 80–100)
MONOCYTES ABSOLUTE: 1.5 K/UL (ref 0.2–0.8)
MONOCYTES RELATIVE PERCENT: 12 %
NEUTROPHILS ABSOLUTE: 7.6 K/UL (ref 2–7.5)
NEUTROPHILS RELATIVE PERCENT: 59.8 %
PDW BLD-RTO: 13.3 % (ref 11–16)
PLATELET # BLD: 266 K/UL (ref 150–400)
PMV BLD AUTO: 10.7 FL (ref 6–10)
POTASSIUM REFLEX MAGNESIUM: 3.8 MMOL/L (ref 3.4–5.1)
RBC # BLD: 5.03 M/UL (ref 4.5–6)
SODIUM BLD-SCNC: 136 MMOL/L (ref 136–145)
TSH SERPL DL<=0.05 MIU/L-ACNC: 3.42 UIU/ML (ref 0.35–5.5)
WBC # BLD: 12.7 K/UL (ref 4–11)

## 2019-05-10 PROCEDURE — 6360000002 HC RX W HCPCS: Performed by: INTERNAL MEDICINE

## 2019-05-10 PROCEDURE — 2580000003 HC RX 258: Performed by: INTERNAL MEDICINE

## 2019-05-10 PROCEDURE — 80048 BASIC METABOLIC PNL TOTAL CA: CPT

## 2019-05-10 PROCEDURE — 84443 ASSAY THYROID STIM HORMONE: CPT

## 2019-05-10 PROCEDURE — 85025 COMPLETE CBC W/AUTO DIFF WBC: CPT

## 2019-05-10 PROCEDURE — 6370000000 HC RX 637 (ALT 250 FOR IP): Performed by: PHYSICIAN ASSISTANT

## 2019-05-10 PROCEDURE — 94760 N-INVAS EAR/PLS OXIMETRY 1: CPT

## 2019-05-10 PROCEDURE — 6370000000 HC RX 637 (ALT 250 FOR IP): Performed by: INTERNAL MEDICINE

## 2019-05-10 PROCEDURE — 36415 COLL VENOUS BLD VENIPUNCTURE: CPT

## 2019-05-10 PROCEDURE — 2500000003 HC RX 250 WO HCPCS: Performed by: INTERNAL MEDICINE

## 2019-05-10 PROCEDURE — 99222 1ST HOSP IP/OBS MODERATE 55: CPT | Performed by: INTERNAL MEDICINE

## 2019-05-10 PROCEDURE — 1200000000 HC SEMI PRIVATE

## 2019-05-10 RX ORDER — NICOTINE 21 MG/24HR
1 PATCH, TRANSDERMAL 24 HOURS TRANSDERMAL DAILY
Status: DISCONTINUED | OUTPATIENT
Start: 2019-05-10 | End: 2019-05-11 | Stop reason: HOSPADM

## 2019-05-10 RX ORDER — ERGOCALCIFEROL (VITAMIN D2) 1250 MCG
50000 CAPSULE ORAL WEEKLY
COMMUNITY

## 2019-05-10 RX ORDER — ACETAMINOPHEN 325 MG/1
650 TABLET ORAL EVERY 6 HOURS PRN
Status: DISCONTINUED | OUTPATIENT
Start: 2019-05-10 | End: 2019-05-11 | Stop reason: HOSPADM

## 2019-05-10 RX ADMIN — SILVER SULFADIAZINE: 10 CREAM TOPICAL at 20:40

## 2019-05-10 RX ADMIN — Medication 10 ML: at 00:28

## 2019-05-10 RX ADMIN — SILVER SULFADIAZINE: 10 CREAM TOPICAL at 00:25

## 2019-05-10 RX ADMIN — Medication 10 ML: at 08:30

## 2019-05-10 RX ADMIN — SILVER SULFADIAZINE: 10 CREAM TOPICAL at 08:30

## 2019-05-10 RX ADMIN — PIPERACILLIN SODIUM,TAZOBACTAM SODIUM 3.38 G: 3; .375 INJECTION, POWDER, FOR SOLUTION INTRAVENOUS at 06:41

## 2019-05-10 RX ADMIN — FAMOTIDINE 20 MG: 20 TABLET, FILM COATED ORAL at 08:29

## 2019-05-10 RX ADMIN — CHLORTHALIDONE 50 MG: 25 TABLET ORAL at 08:29

## 2019-05-10 RX ADMIN — GABAPENTIN 600 MG: 300 CAPSULE ORAL at 08:29

## 2019-05-10 RX ADMIN — FAMOTIDINE 20 MG: 20 TABLET, FILM COATED ORAL at 20:40

## 2019-05-10 RX ADMIN — LISINOPRIL 20 MG: 20 TABLET ORAL at 08:29

## 2019-05-10 RX ADMIN — PIPERACILLIN SODIUM,TAZOBACTAM SODIUM 3.38 G: 3; .375 INJECTION, POWDER, FOR SOLUTION INTRAVENOUS at 15:02

## 2019-05-10 RX ADMIN — PIPERACILLIN SODIUM,TAZOBACTAM SODIUM 3.38 G: 3; .375 INJECTION, POWDER, FOR SOLUTION INTRAVENOUS at 00:25

## 2019-05-10 RX ADMIN — GABAPENTIN 600 MG: 300 CAPSULE ORAL at 15:26

## 2019-05-10 RX ADMIN — FAMOTIDINE 20 MG: 20 TABLET, FILM COATED ORAL at 00:25

## 2019-05-10 NOTE — H&P
History and Physical    Patient:  Christina Oh    CHIEF COMPLAINT:    Redness, swelling, pain left hand    HISTORY OF PRESENT ILLNESS:   The patient is a 54 y.o. male with PMH of chronic back pain, opioid dependence on suboxone, and chronic hep C who presents due to redness, swelling, pain in his left hand with a scabbed wound after hitting himself with a hammer about 5 days ago. He presented to ED on 5/6 and imaging did not reveal fracture. He was given a tetanus shot and doxycyline and bactrim. Patient reports he has been compliant with antibiotics but thought the swelling, redness, and pain were getting worse not better. No fever, sweats, chills. No purulent drainage from the scabbed wound on his hand. He is able to move his hand and fingers without difficulty and sensation intact. Of note He does have history of iv drug abuse in the past but has been clean for about 7 years. He takes suboxone. Unaware he has hepatitis c. Past Medical History:      Diagnosis Date    Chronic back pain     Hypertension     IV drug abuse ([facility] [address] 2011    7 years clean       Past Surgical History:      Procedure Laterality Date    BACK SURGERY  2006    FOOT SURGERY Right     FRACTURE SURGERY Right     ankle    SPINE SURGERY         Medications Prior to Admission:    Prior to Admission medications    Medication Sig Start Date End Date Taking?  Authorizing Provider   chlorthalidone (HYGROTEN) 50 MG tablet Take 50 mg by mouth daily   Yes Historical Provider, MD   sulfamethoxazole-trimethoprim (BACTRIM DS;SEPTRA DS) 800-160 MG per tablet Take 1 tablet by mouth 2 times daily for 7 days 5/6/19 5/13/19 Yes Sterling Lin MD   doxycycline hyclate (VIBRA-TABS) 100 MG tablet Take 1 tablet by mouth 2 times daily for 10 days 5/6/19 5/16/19 Yes Sterling Lin MD   ranitidine (ZANTAC) 300 MG tablet Take 300 mg by mouth nightly   Yes Historical Provider, MD   fluticasone-salmeterol (ADVAIR) 250-50 MCG/DOSE AEPB Inhale 1 puff into distress, no wheezing, rales or rhonchi detected  Cardiovascular:  Normal rate, normal rhythm, no murmurs, no gallops, no rubs, no edema   GI:  Soft, obese, nondistended, normal bowel sounds, nontender, no voluntary guarding  Musculoskeletal:  No cyanosis or obvious acute deformity. Moving all extremities. ROM and sensation intact both hands. Integument:  Warm and dry. Approximately 2x2 cm scabbed wound to base of left thumb with surrounding swelling, redness, and tender to palpation. Neurologic:  Alert & oriented x 3, no apparent focal deficits noted   Psychiatric:  Speech and behavior appropriate         Lab Results   Component Value Date     05/10/2019    K 3.8 05/10/2019    CL 95 (L) 05/10/2019    CO2 29 05/10/2019    BUN 10 05/10/2019    CREATININE 0.8 05/10/2019    GLUCOSE 124 (H) 05/10/2019    CALCIUM 9.6 05/10/2019    PROT 7.7 05/09/2019    LABALBU 4.4 05/09/2019    BILITOT 0.4 05/09/2019    ALKPHOS 96 05/09/2019    AST 26 05/09/2019    ALT 28 05/09/2019    LABGLOM >59 05/10/2019    GFRAA >59 05/10/2019    AGRATIO 1.3 05/09/2019    GLOB 3.3 05/09/2019           Lab Results   Component Value Date    WBC 12.7 (H) 05/10/2019    HGB 15.0 05/10/2019    HCT 45.0 05/10/2019    MCV 89.5 05/10/2019     05/10/2019     CT HAND LEFT W CONTRAST   Final Result      1. Cellulitis and subcutaneous soft tissue swelling but no sign of any definable fluid collection or abscess a ring-enhancing fluid collection. 2. No bony fracture or periosteal reaction. Normal-appearing wrist joint    Hand          Assessment and Plan     Active Hospital Problems    Diagnosis Date Noted    Opioid dependence ([facility] [address] [F11.20]  Continue home suboxone. maynor reviewed. 05/10/2019    Chronic hepatitis C without hepatic coma ([facility] [address] [B18.2]  Patient notified of hep c antibody positive in past. ast and alt normal at this time.  May benefit from GI follow up as outpatient, defer to pcp.  05/10/2019    Cellulitis of left hand [B85.110]  Patient presents due to redness, swelling, pain left hand after hitting himself with a hammer about 5 days ago. Was treated with doxycyline and bactrim this week but per patient swelling and redness not improving with this regimen. CT hand without focal abscess or fracture (see report above). Afebrile. Leukocytosis noted at 18 which improved today to 12.7. Continue IV zosyn and local care with topical silvadene. He did receive tetanus shot after injury this week. Monitor. 05/09/2019    Chronic back pain [M54.9, G89.29]  Continue home gabapentin . maynor reviewed. 08/21/2014     Patient was seen and examined by Dr. Daphnie Del Valle and plan of care reviewed.     Josiane Quach certifies per CMS regulation for 42 .15(a), that the patient may reasonably be expected to be discharged or transferred to a hospital within 96 hours after admission to [address] [place] [address]    Electronically signed by FÉLIX Flanagan on 5/10/2019 at 11:51 AM

## 2019-05-10 NOTE — FLOWSHEET NOTE
05/09/19 2221   Assessment   Charting Type Admission   Neurological   Neuro (WDL) WDL   Level of Consciousness 0   [place] Coma Scale   Eye Opening 4   Best Verbal Response 5   Best Motor Response 6   [place] Coma Scale Score 15   NIH/MNHISS Stroke Scale   NIH/MNIHSS Stroke Scale Assessed No   HEENT   HEENT (WDL) X   Right Eye Impaired vision   Left Eye Impaired vision   Right Ear Impaired hearing   Left Ear Impaired hearing   Nose Intact   Throat Intact   Tongue Pink & moist   Voice Normal   Mucous Membrane Moist;Pink; Intact   Teeth Missing teeth   Respiratory   Respiratory (WDL) WDL   Cardiac   Cardiac (WDL) WDL   Gastrointestinal   Abdominal (WDL) X  (obese)   Abdomen Inspection Rounded   Last BM (including prior to admit) 05/09/19   RUQ Bowel Sounds Active   LUQ Bowel Sounds Active   RLQ Bowel Sounds Active   LLQ Bowel Sounds Active   Peripheral Vascular   Peripheral Vascular (WDL) X   Edema Right lower extremity; Left lower extremity   RLE Edema +1   LLE Edema +1   Skin Color/Condition   Skin Color/Condition (WDL) WDL   Skin Integrity   Skin Integrity (WDL) X   Skin Integrity Redness; Other (Comment)  (trauma/cellulitis)   Location left hand    Musculoskeletal   Musculoskeletal (WDL) WDL   Genitourinary   Genitourinary (WDL) WDL   Flank Tenderness No   Suprapubic Tenderness No   Dysuria No   Wound 05/09/19 Hand Anterior; Left   Date First Assessed/Time First Assessed: 05/09/19 2159   Present on Hospital Admission: Yes  Primary Wound Type: Traumatic  Location: Hand  Wound Location Orientation: Anterior; Left   Wound Traumatic   Dressing Status   (open to air )   Dressing Changed   (none)   Dressing/Treatment Open to air   Wound Cleansed Rinsed/Irrigated with saline   Drainage Amount Scant   Drainage Description Serosanguinous   Odor None   Margins Attached edges   Jenny-wound Assessment Intact; Swelling; Purple;[place]   Psychosocial   Psychosocial (WDL) WDL

## 2019-05-10 NOTE — PROGRESS NOTES
Med rec completed using fill history from [facility]. The only change made to the home med list was adding vitamin d2 50,000 units weekly to the list as the pt recently filled it on 5/2/19. All other medications on the home med list matched the pt's recent fill history with [facility].

## 2019-05-10 NOTE — PROGRESS NOTES
Patient to floor at this time  Patient without acute distress  Wound to left hand from trauma, patient was swinging hammer when he missed and hit his hand   Patient with cellulitis to left hand   Very minimal serosanguinous fluid from wound  Area scabbed and noted to have edema and erythema   Patient states pain level 2-3 at this time  Will continue to monitor

## 2019-05-10 NOTE — ED NOTES
Received call from [facility] and spoke with kalpesh and she was checking on patient status. I informed her that the patient will be admitted here. She stated that she will close the case on the patient.      [address]  05/09/19 0759

## 2019-05-11 VITALS
HEART RATE: 89 BPM | OXYGEN SATURATION: 95 % | DIASTOLIC BLOOD PRESSURE: 78 MMHG | SYSTOLIC BLOOD PRESSURE: 130 MMHG | WEIGHT: 278.13 LBS | RESPIRATION RATE: 18 BRPM | HEIGHT: 71 IN | TEMPERATURE: 97.4 F | BODY MASS INDEX: 38.94 KG/M2

## 2019-05-11 PROBLEM — Z72.0 TOBACCO ABUSE: Status: ACTIVE | Noted: 2019-05-11

## 2019-05-11 PROBLEM — I10 BENIGN ESSENTIAL HTN: Status: ACTIVE | Noted: 2019-05-11

## 2019-05-11 PROCEDURE — 6370000000 HC RX 637 (ALT 250 FOR IP): Performed by: INTERNAL MEDICINE

## 2019-05-11 PROCEDURE — 99238 HOSP IP/OBS DSCHRG MGMT 30/<: CPT | Performed by: PHYSICIAN ASSISTANT

## 2019-05-11 PROCEDURE — 2580000003 HC RX 258: Performed by: INTERNAL MEDICINE

## 2019-05-11 PROCEDURE — 6360000002 HC RX W HCPCS: Performed by: INTERNAL MEDICINE

## 2019-05-11 PROCEDURE — 6370000000 HC RX 637 (ALT 250 FOR IP): Performed by: PHYSICIAN ASSISTANT

## 2019-05-11 RX ORDER — LISINOPRIL 20 MG/1
20 TABLET ORAL DAILY
Qty: 30 TABLET | Refills: 3 | Status: SHIPPED
Start: 2019-05-11 | End: 2020-12-12

## 2019-05-11 RX ORDER — AMOXICILLIN AND CLAVULANATE POTASSIUM 875; 125 MG/1; MG/1
1 TABLET, FILM COATED ORAL 2 TIMES DAILY
Qty: 14 TABLET | Refills: 0 | Status: SHIPPED | OUTPATIENT
Start: 2019-05-11 | End: 2019-05-18

## 2019-05-11 RX ORDER — LISINOPRIL 20 MG/1
10 TABLET ORAL DAILY
Qty: 30 TABLET | Refills: 3 | Status: SHIPPED
Start: 2019-05-11 | End: 2019-05-11 | Stop reason: SDUPTHER

## 2019-05-11 RX ADMIN — GABAPENTIN 600 MG: 300 CAPSULE ORAL at 08:08

## 2019-05-11 RX ADMIN — CHLORTHALIDONE 50 MG: 25 TABLET ORAL at 08:08

## 2019-05-11 RX ADMIN — LISINOPRIL 20 MG: 20 TABLET ORAL at 08:08

## 2019-05-11 RX ADMIN — PIPERACILLIN SODIUM,TAZOBACTAM SODIUM 3.38 G: 3; .375 INJECTION, POWDER, FOR SOLUTION INTRAVENOUS at 00:48

## 2019-05-11 RX ADMIN — PIPERACILLIN SODIUM,TAZOBACTAM SODIUM 3.38 G: 3; .375 INJECTION, POWDER, FOR SOLUTION INTRAVENOUS at 08:09

## 2019-05-11 RX ADMIN — Medication 10 ML: at 08:11

## 2019-05-11 RX ADMIN — FAMOTIDINE 20 MG: 20 TABLET, FILM COATED ORAL at 08:08

## 2019-05-11 RX ADMIN — SILVER SULFADIAZINE: 10 CREAM TOPICAL at 08:10

## 2019-05-11 NOTE — DISCHARGE SUMMARY
C without hepatic coma (HCC) [B18.2]  Hep C Ab positive in 2018 and patient was unaware of this. Discussed test result with him. He does have history of IV drug abuse but has been clean for several years he reports. ALT and AST within normal limits. May benefit from gi referral in future regarding this, will defer to pcp.  05/10/2019    Cellulitis of left hand [L03.114]  Patient presented due to redness, swelling, pain left hand after hitting himself with a hammer about 5 days ago. Was treated with doxycyline and bactrim as outpatient without improvement. Did receive tetanus shot. CT hand without focal abscess or fracture (see report above) and ED physician did discuss the case with hand surgery at [facility]. He has remained afebrile. WBC count improvved from 18 to 12. Treated with Zosyn IV and local care with silvadene while inpatient. Swelling and redness improving although some bruising and swelling noted around the wound related to injury by hammer. Patient is stable for dc home. Will prescribe augmentin. Recommend close follow up with pcp early next week and return to ED with any worsening. 05/09/2019    Chronic back pain [M54.9, G89.29]  Continue home gabapentin. maynor reviewed. 08/21/2014         Vital Signs  Temp: 97.4 °F (36.3 °C)  Pulse: 89  Resp: 18  BP: 130/78(manual)  SpO2: 95 %  O2 Device: None (Room air)       Vital signs reviewed in electronic chart. Physical exam  Constitutional:  Well developed, well nourished, no acute distress  Eyes:  PERRL, no scleral icterus, conjunctiva normal   HENT:  Atraumatic, external ears normal, nose normal, oropharynx moist, no pharyngeal exudates.  Neck- supple, no JVD, no lymphadenopathy  Respiratory:  No respiratory distress, no wheezing, rales or rhonchi detected  Cardiovascular:  Normal rate, normal rhythm, no murmurs, no gallops, no rubs, no edema   GI:  Soft, obese, nondistended, normal bowel sounds, nontender, no voluntary guarding  Musculoskeletal:  No cyanosis or obvious acute deformity. Moving all extremities. ROM and sensation intact both hands. Integument:  Warm and dry. Approximately 2x2 cm scabbed wound to base of left thumb with surrounding swelling, redness, and mild tenderness to palpation - improving from previous    Neurologic:  Alert & oriented x 3, no apparent focal deficits noted   Psychiatric:  Speech and behavior appropriate       Disposition: home  Discharged Condition: Stable  Activity: activity as tolerated  Diet: cardiac diet  Follow Up: Primary Care Physician in 3-5 days. Labs: For convenience and continuity at follow-up the following most recent labs are provided:    CBC:   Lab Results   Component Value Date    WBC 12.7 05/10/2019    HGB 15.0 05/10/2019    HCT 45.0 05/10/2019     05/10/2019       RENAL:   Lab Results   Component Value Date     05/10/2019    K 3.8 05/10/2019    CL 95 05/10/2019    CO2 29 05/10/2019    BUN 10 05/10/2019    CREATININE 0.8 05/10/2019         Discharge Medications:      Current Discharge Medication List           Details   silver sulfADIAZINE (SILVADENE) 1 % cream Apply topically 2 times daily Apply topically daily. Qty: 50 g, Refills: 0      amoxicillin-clavulanate (AUGMENTIN) 875-125 MG per tablet Take 1 tablet by mouth 2 times daily for 7 days  Qty: 14 tablet, Refills: 0              Details   lisinopril (PRINIVIL;ZESTRIL) 20 MG tablet Take 1 tablet by mouth daily  Qty: 30 tablet, Refills: 3              Details   ergocalciferol (ERGOCALCIFEROL) 17061 units capsule Take 50,000 Units by mouth once a week      chlorthalidone (HYGROTEN) 50 MG tablet Take 50 mg by mouth daily      ranitidine (ZANTAC) 300 MG tablet Take 300 mg by mouth nightly      fluticasone-salmeterol (ADVAIR) 250-50 MCG/DOSE AEPB Inhale 1 puff into the lungs every 12 hours      gabapentin (NEURONTIN) 600 MG tablet Take 600 mg by mouth 2 times daily.        buprenorphine-naloxone (SUBOXONE) 8-2 MG SUBL SL tablet Place 1 tablet under the

## 2019-05-11 NOTE — PROGRESS NOTES
Pt discharged at this time. Pt IV removed. Pt educated on need to  medications and keep follow up appointments. Pt left floor ambulatory.

## 2019-05-11 NOTE — PLAN OF CARE
Problem: Falls - Risk of:  Goal: Will remain free from falls  Description  Will remain free from falls  5/11/2019 0905 by Chilango Leon RN  Outcome: Ongoing  5/11/2019 0318 by Anuja Mon RN  Outcome: Ongoing  Goal: Absence of physical injury  Description  Absence of physical injury  5/11/2019 0318 by Anuja Mon RN  Outcome: Ongoing

## 2019-05-15 LAB — BLOOD CULTURE, ROUTINE: NORMAL

## 2019-09-06 ENCOUNTER — APPOINTMENT (OUTPATIENT)
Dept: CT IMAGING | Facility: HOSPITAL | Age: 49
End: 2019-09-06

## 2019-09-06 ENCOUNTER — HOSPITAL ENCOUNTER (EMERGENCY)
Facility: HOSPITAL | Age: 49
Discharge: HOME | End: 2019-09-06
Attending: EMERGENCY MEDICINE | Admitting: EMERGENCY MEDICINE

## 2019-09-06 ENCOUNTER — APPOINTMENT (OUTPATIENT)
Dept: ULTRASOUND IMAGING | Facility: HOSPITAL | Age: 49
End: 2019-09-06

## 2019-09-06 ENCOUNTER — APPOINTMENT (OUTPATIENT)
Dept: GENERAL RADIOLOGY | Facility: HOSPITAL | Age: 49
End: 2019-09-06

## 2019-09-06 VITALS
BODY MASS INDEX: 38.65 KG/M2 | RESPIRATION RATE: 18 BRPM | SYSTOLIC BLOOD PRESSURE: 121 MMHG | DIASTOLIC BLOOD PRESSURE: 83 MMHG | HEART RATE: 88 BPM | WEIGHT: 270 LBS | TEMPERATURE: 98.5 F | OXYGEN SATURATION: 94 % | HEIGHT: 70 IN

## 2019-09-06 DIAGNOSIS — L03.119 CELLULITIS OF UPPER EXTREMITY, UNSPECIFIED LATERALITY: ICD-10-CM

## 2019-09-06 DIAGNOSIS — R56.9 SEIZURE (HCC): Primary | ICD-10-CM

## 2019-09-06 LAB
ALBUMIN SERPL-MCNC: 3.9 G/DL (ref 3.5–5.2)
ALBUMIN/GLOB SERPL: 1.3 G/DL
ALP SERPL-CCNC: 105 U/L (ref 39–117)
ALT SERPL W P-5'-P-CCNC: 31 U/L (ref 1–41)
AMPHET+METHAMPHET UR QL: NEGATIVE
AMPHETAMINES UR QL: NEGATIVE
ANION GAP SERPL CALCULATED.3IONS-SCNC: 16.5 MMOL/L (ref 5–15)
APAP SERPL-MCNC: <5 MCG/ML (ref 10–30)
AST SERPL-CCNC: 26 U/L (ref 1–40)
BACTERIA UR QL AUTO: ABNORMAL /HPF
BARBITURATES UR QL SCN: NEGATIVE
BASOPHILS # BLD AUTO: 0.04 10*3/MM3 (ref 0–0.2)
BASOPHILS NFR BLD AUTO: 0.3 % (ref 0–1.5)
BENZODIAZ UR QL SCN: NEGATIVE
BILIRUB SERPL-MCNC: 0.2 MG/DL (ref 0.2–1.2)
BILIRUB UR QL STRIP: NEGATIVE
BUN BLD-MCNC: 7 MG/DL (ref 6–20)
BUN/CREAT SERPL: 9.6 (ref 7–25)
BUPRENORPHINE SERPL-MCNC: POSITIVE NG/ML
CALCIUM SPEC-SCNC: 8.9 MG/DL (ref 8.6–10.5)
CANNABINOIDS SERPL QL: NEGATIVE
CHLORIDE SERPL-SCNC: 99 MMOL/L (ref 98–107)
CLARITY UR: CLEAR
CO2 SERPL-SCNC: 21.5 MMOL/L (ref 22–29)
COCAINE UR QL: NEGATIVE
COLOR UR: YELLOW
CREAT BLD-MCNC: 0.73 MG/DL (ref 0.76–1.27)
DEPRECATED RDW RBC AUTO: 41.4 FL (ref 37–54)
EOSINOPHIL # BLD AUTO: 0.08 10*3/MM3 (ref 0–0.4)
EOSINOPHIL NFR BLD AUTO: 0.5 % (ref 0.3–6.2)
ERYTHROCYTE [DISTWIDTH] IN BLOOD BY AUTOMATED COUNT: 13.2 % (ref 12.3–15.4)
ETHANOL BLD-MCNC: <10 MG/DL (ref 0–10)
ETHANOL UR QL: <0.01 %
GFR SERPL CREATININE-BSD FRML MDRD: 115 ML/MIN/1.73
GLOBULIN UR ELPH-MCNC: 3.1 GM/DL
GLUCOSE BLD-MCNC: 208 MG/DL (ref 65–99)
GLUCOSE UR STRIP-MCNC: NEGATIVE MG/DL
HCT VFR BLD AUTO: 43.9 % (ref 37.5–51)
HGB BLD-MCNC: 14.9 G/DL (ref 13–17.7)
HGB UR QL STRIP.AUTO: NEGATIVE
HYALINE CASTS UR QL AUTO: ABNORMAL /LPF
IMM GRANULOCYTES # BLD AUTO: 0.1 10*3/MM3 (ref 0–0.05)
IMM GRANULOCYTES NFR BLD AUTO: 0.7 % (ref 0–0.5)
KETONES UR QL STRIP: ABNORMAL
LEUKOCYTE ESTERASE UR QL STRIP.AUTO: NEGATIVE
LIPASE SERPL-CCNC: 16 U/L (ref 13–60)
LYMPHOCYTES # BLD AUTO: 1.23 10*3/MM3 (ref 0.7–3.1)
LYMPHOCYTES NFR BLD AUTO: 8.1 % (ref 19.6–45.3)
MAGNESIUM SERPL-MCNC: 2.4 MG/DL (ref 1.6–2.6)
MCH RBC QN AUTO: 28.9 PG (ref 26.6–33)
MCHC RBC AUTO-ENTMCNC: 33.9 G/DL (ref 31.5–35.7)
MCV RBC AUTO: 85.2 FL (ref 79–97)
METHADONE UR QL SCN: NEGATIVE
MONOCYTES # BLD AUTO: 1.17 10*3/MM3 (ref 0.1–0.9)
MONOCYTES NFR BLD AUTO: 7.7 % (ref 5–12)
MUCOUS THREADS URNS QL MICRO: ABNORMAL /HPF
NEUTROPHILS # BLD AUTO: 12.54 10*3/MM3 (ref 1.7–7)
NEUTROPHILS NFR BLD AUTO: 82.7 % (ref 42.7–76)
NITRITE UR QL STRIP: NEGATIVE
NRBC BLD AUTO-RTO: 0 /100 WBC (ref 0–0.2)
OPIATES UR QL: NEGATIVE
OXYCODONE UR QL SCN: NEGATIVE
PCP UR QL SCN: NEGATIVE
PH UR STRIP.AUTO: <=5 [PH] (ref 5–8)
PLATELET # BLD AUTO: 231 10*3/MM3 (ref 140–450)
PMV BLD AUTO: 10 FL (ref 6–12)
POTASSIUM BLD-SCNC: 3.4 MMOL/L (ref 3.5–5.2)
PROCALCITONIN SERPL-MCNC: 0.1 NG/ML (ref 0.1–0.25)
PROPOXYPH UR QL: NEGATIVE
PROT SERPL-MCNC: 7 G/DL (ref 6–8.5)
PROT UR QL STRIP: ABNORMAL
RBC # BLD AUTO: 5.15 10*6/MM3 (ref 4.14–5.8)
RBC # UR: ABNORMAL /HPF
REF LAB TEST METHOD: ABNORMAL
SALICYLATES SERPL-MCNC: <3 MG/DL
SODIUM BLD-SCNC: 137 MMOL/L (ref 136–145)
SP GR UR STRIP: 1.02 (ref 1–1.03)
SPERM URNS QL MICRO: ABNORMAL /HPF
SQUAMOUS #/AREA URNS HPF: ABNORMAL /HPF
TRICYCLICS UR QL SCN: NEGATIVE
TROPONIN T SERPL-MCNC: <0.01 NG/ML (ref 0–0.03)
UROBILINOGEN UR QL STRIP: ABNORMAL
WBC NRBC COR # BLD: 15.16 10*3/MM3 (ref 3.4–10.8)
WBC UR QL AUTO: ABNORMAL /HPF

## 2019-09-06 PROCEDURE — 99284 EMERGENCY DEPT VISIT MOD MDM: CPT

## 2019-09-06 PROCEDURE — 70450 CT HEAD/BRAIN W/O DYE: CPT

## 2019-09-06 PROCEDURE — 71046 X-RAY EXAM CHEST 2 VIEWS: CPT

## 2019-09-06 PROCEDURE — 80307 DRUG TEST PRSMV CHEM ANLYZR: CPT | Performed by: EMERGENCY MEDICINE

## 2019-09-06 PROCEDURE — 93971 EXTREMITY STUDY: CPT

## 2019-09-06 PROCEDURE — 84484 ASSAY OF TROPONIN QUANT: CPT | Performed by: EMERGENCY MEDICINE

## 2019-09-06 PROCEDURE — 25010000002 LORAZEPAM PER 2 MG: Performed by: EMERGENCY MEDICINE

## 2019-09-06 PROCEDURE — 84145 PROCALCITONIN (PCT): CPT | Performed by: EMERGENCY MEDICINE

## 2019-09-06 PROCEDURE — 87086 URINE CULTURE/COLONY COUNT: CPT | Performed by: EMERGENCY MEDICINE

## 2019-09-06 PROCEDURE — 96374 THER/PROPH/DIAG INJ IV PUSH: CPT

## 2019-09-06 PROCEDURE — 85025 COMPLETE CBC W/AUTO DIFF WBC: CPT | Performed by: EMERGENCY MEDICINE

## 2019-09-06 PROCEDURE — 81001 URINALYSIS AUTO W/SCOPE: CPT | Performed by: EMERGENCY MEDICINE

## 2019-09-06 PROCEDURE — 80053 COMPREHEN METABOLIC PANEL: CPT | Performed by: EMERGENCY MEDICINE

## 2019-09-06 PROCEDURE — 83690 ASSAY OF LIPASE: CPT | Performed by: EMERGENCY MEDICINE

## 2019-09-06 PROCEDURE — 93005 ELECTROCARDIOGRAM TRACING: CPT | Performed by: EMERGENCY MEDICINE

## 2019-09-06 PROCEDURE — 83735 ASSAY OF MAGNESIUM: CPT | Performed by: EMERGENCY MEDICINE

## 2019-09-06 PROCEDURE — 80306 DRUG TEST PRSMV INSTRMNT: CPT | Performed by: EMERGENCY MEDICINE

## 2019-09-06 PROCEDURE — 96376 TX/PRO/DX INJ SAME DRUG ADON: CPT

## 2019-09-06 RX ORDER — LORAZEPAM 2 MG/ML
1 INJECTION INTRAMUSCULAR ONCE
Status: COMPLETED | OUTPATIENT
Start: 2019-09-06 | End: 2019-09-06

## 2019-09-06 RX ORDER — SODIUM CHLORIDE 0.9 % (FLUSH) 0.9 %
10 SYRINGE (ML) INJECTION AS NEEDED
Status: DISCONTINUED | OUTPATIENT
Start: 2019-09-06 | End: 2019-09-06

## 2019-09-06 RX ORDER — DOXYCYCLINE 100 MG/1
100 CAPSULE ORAL 2 TIMES DAILY
Qty: 14 CAPSULE | Refills: 0 | Status: SHIPPED | OUTPATIENT
Start: 2019-09-06 | End: 2019-09-13

## 2019-09-06 RX ADMIN — SODIUM CHLORIDE 1000 ML: 9 INJECTION, SOLUTION INTRAVENOUS at 06:04

## 2019-09-06 RX ADMIN — LORAZEPAM 1 MG: 2 INJECTION, SOLUTION INTRAMUSCULAR; INTRAVENOUS at 04:32

## 2019-09-06 RX ADMIN — SODIUM CHLORIDE 2000 ML: 9 INJECTION, SOLUTION INTRAVENOUS at 04:32

## 2019-09-06 RX ADMIN — LORAZEPAM 1 MG: 2 INJECTION, SOLUTION INTRAMUSCULAR; INTRAVENOUS at 06:07

## 2019-09-07 LAB — BACTERIA SPEC AEROBE CULT: NORMAL

## 2019-12-06 ENCOUNTER — HOSPITAL ENCOUNTER (EMERGENCY)
Facility: HOSPITAL | Age: 49
Discharge: HOME | End: 2019-12-06
Attending: EMERGENCY MEDICINE
Payer: COMMERCIAL

## 2019-12-06 ENCOUNTER — APPOINTMENT (OUTPATIENT)
Dept: GENERAL RADIOLOGY | Facility: HOSPITAL | Age: 49
End: 2019-12-06
Payer: COMMERCIAL

## 2019-12-06 VITALS
HEIGHT: 71 IN | DIASTOLIC BLOOD PRESSURE: 81 MMHG | OXYGEN SATURATION: 94 % | BODY MASS INDEX: 36.68 KG/M2 | SYSTOLIC BLOOD PRESSURE: 118 MMHG | TEMPERATURE: 98.3 F | RESPIRATION RATE: 19 BRPM | WEIGHT: 262 LBS | HEART RATE: 95 BPM

## 2019-12-06 DIAGNOSIS — S89.91XA RIGHT KNEE INJURY, INITIAL ENCOUNTER: Primary | ICD-10-CM

## 2019-12-06 PROCEDURE — 73562 X-RAY EXAM OF KNEE 3: CPT

## 2019-12-06 PROCEDURE — 99283 EMERGENCY DEPT VISIT LOW MDM: CPT

## 2019-12-06 RX ORDER — FLUTICASONE FUROATE AND VILANTEROL 200; 25 UG/1; UG/1
1 POWDER RESPIRATORY (INHALATION) DAILY
COMMUNITY

## 2019-12-06 ASSESSMENT — PAIN SCALES - GENERAL: PAINLEVEL_OUTOF10: 8

## 2019-12-06 ASSESSMENT — PAIN DESCRIPTION - DESCRIPTORS: DESCRIPTORS: OTHER (COMMENT);SHARP

## 2019-12-06 ASSESSMENT — PAIN DESCRIPTION - FREQUENCY: FREQUENCY: CONTINUOUS

## 2019-12-06 ASSESSMENT — PAIN DESCRIPTION - ORIENTATION: ORIENTATION: RIGHT

## 2019-12-06 ASSESSMENT — PAIN DESCRIPTION - LOCATION: LOCATION: KNEE

## 2019-12-06 ASSESSMENT — PAIN DESCRIPTION - PAIN TYPE: TYPE: ACUTE PAIN

## 2019-12-20 ENCOUNTER — HOSPITAL ENCOUNTER (OUTPATIENT)
Facility: HOSPITAL | Age: 49
Discharge: HOME | End: 2019-12-20
Payer: COMMERCIAL

## 2019-12-20 LAB
A/G RATIO: 1.7 (ref 0.8–2)
ALBUMIN SERPL-MCNC: 4.8 G/DL (ref 3.4–4.8)
ALP BLD-CCNC: 115 U/L (ref 25–100)
ALT SERPL-CCNC: 38 U/L (ref 4–36)
ANION GAP SERPL CALCULATED.3IONS-SCNC: 13 MMOL/L (ref 3–16)
AST SERPL-CCNC: 30 U/L (ref 8–33)
BILIRUB SERPL-MCNC: 0.4 MG/DL (ref 0.3–1.2)
BUN BLDV-MCNC: 9 MG/DL (ref 6–20)
CALCIUM SERPL-MCNC: 10.1 MG/DL (ref 8.5–10.5)
CHLORIDE BLD-SCNC: 96 MMOL/L (ref 98–107)
CO2: 29 MMOL/L (ref 20–30)
CREAT SERPL-MCNC: 0.7 MG/DL (ref 0.4–1.2)
GFR AFRICAN AMERICAN: >59
GFR NON-AFRICAN AMERICAN: >59
GLOBULIN: 2.9 G/DL
GLUCOSE BLD-MCNC: 96 MG/DL (ref 74–106)
HCT VFR BLD CALC: 46.5 % (ref 40–54)
HEMOGLOBIN: 15.8 G/DL (ref 13–18)
MCH RBC QN AUTO: 29.5 PG (ref 27–32)
MCHC RBC AUTO-ENTMCNC: 34 G/DL (ref 31–35)
MCV RBC AUTO: 86.8 FL (ref 80–100)
PDW BLD-RTO: 13.3 % (ref 11–16)
PLATELET # BLD: 334 K/UL (ref 150–400)
PMV BLD AUTO: 9.8 FL (ref 6–10)
POTASSIUM SERPL-SCNC: 4.4 MMOL/L (ref 3.4–5.1)
RBC # BLD: 5.36 M/UL (ref 4.5–6)
SODIUM BLD-SCNC: 138 MMOL/L (ref 136–145)
TOTAL PROTEIN: 7.7 G/DL (ref 6.4–8.3)
WBC # BLD: 15.3 K/UL (ref 4–11)

## 2019-12-20 PROCEDURE — 80053 COMPREHEN METABOLIC PANEL: CPT

## 2019-12-20 PROCEDURE — 87390 HIV-1 AG IA: CPT

## 2019-12-20 PROCEDURE — 36415 COLL VENOUS BLD VENIPUNCTURE: CPT

## 2019-12-20 PROCEDURE — 86702 HIV-2 ANTIBODY: CPT

## 2019-12-20 PROCEDURE — 80074 ACUTE HEPATITIS PANEL: CPT

## 2019-12-20 PROCEDURE — 85027 COMPLETE CBC AUTOMATED: CPT

## 2019-12-20 PROCEDURE — 86701 HIV-1ANTIBODY: CPT

## 2020-01-03 ENCOUNTER — HOSPITAL ENCOUNTER (EMERGENCY)
Facility: HOSPITAL | Age: 50
Discharge: HOME | End: 2020-01-03
Attending: FAMILY MEDICINE
Payer: COMMERCIAL

## 2020-01-03 VITALS
RESPIRATION RATE: 16 BRPM | WEIGHT: 272 LBS | HEART RATE: 94 BPM | OXYGEN SATURATION: 98 % | SYSTOLIC BLOOD PRESSURE: 143 MMHG | BODY MASS INDEX: 38.08 KG/M2 | TEMPERATURE: 97.9 F | DIASTOLIC BLOOD PRESSURE: 87 MMHG | HEIGHT: 71 IN

## 2020-01-03 PROCEDURE — 2500000003 HC RX 250 WO HCPCS: Performed by: FAMILY MEDICINE

## 2020-01-03 PROCEDURE — 6370000000 HC RX 637 (ALT 250 FOR IP): Performed by: FAMILY MEDICINE

## 2020-01-03 PROCEDURE — 10060 I&D ABSCESS SIMPLE/SINGLE: CPT

## 2020-01-03 PROCEDURE — 99282 EMERGENCY DEPT VISIT SF MDM: CPT

## 2020-01-03 RX ORDER — LIDOCAINE HYDROCHLORIDE 10 MG/ML
5 INJECTION, SOLUTION INFILTRATION; PERINEURAL ONCE
Status: COMPLETED | OUTPATIENT
Start: 2020-01-03 | End: 2020-01-03

## 2020-01-03 RX ORDER — SULFAMETHOXAZOLE AND TRIMETHOPRIM 800; 160 MG/1; MG/1
1 TABLET ORAL 2 TIMES DAILY
Qty: 14 TABLET | Refills: 0 | Status: SHIPPED | OUTPATIENT
Start: 2020-01-03 | End: 2020-01-10

## 2020-01-03 RX ORDER — SULFAMETHOXAZOLE AND TRIMETHOPRIM 800; 160 MG/1; MG/1
1 TABLET ORAL ONCE
Status: COMPLETED | OUTPATIENT
Start: 2020-01-03 | End: 2020-01-03

## 2020-01-03 RX ADMIN — SULFAMETHOXAZOLE AND TRIMETHOPRIM 1 TABLET: 800; 160 TABLET ORAL at 18:45

## 2020-01-03 RX ADMIN — LIDOCAINE HYDROCHLORIDE 5 ML: 10 INJECTION, SOLUTION INFILTRATION; PERINEURAL at 18:46

## 2020-01-03 ASSESSMENT — ENCOUNTER SYMPTOMS: COLOR CHANGE: 1

## 2020-01-03 ASSESSMENT — PAIN SCALES - GENERAL
PAINLEVEL_OUTOF10: 4
PAINLEVEL_OUTOF10: 3

## 2020-01-03 ASSESSMENT — PAIN DESCRIPTION - PAIN TYPE: TYPE: ACUTE PAIN

## 2020-01-03 ASSESSMENT — PAIN DESCRIPTION - LOCATION: LOCATION: ARM

## 2020-01-03 NOTE — ED PROVIDER NOTES
[address] [place] [address] Court  eMERGENCY dEPARTMENT eNCOUnter      Pt Name: Barb Fernandez  MRN: [id number]  [place] 1970  Date of evaluation: 1/3/2020  Provider: Drew Small MD    [address] [place] [address]       Chief Complaint   Patient presents with    Abscess         HISTORY OF PRESENT ILLNESS   (Location/Symptom, Timing/Onset, Context/Setting, Quality, Duration, Modifying Factors, Severity)  Note limiting factors. Barb Fernandez is a 54 y.o. male who presents to the emergency department with an abscess in his left antecubital area after a failed drug injection. Nursing Notes were reviewed. REVIEW OF SYSTEMS    (2-9 systems for level 4, 10 or more forlevel 5)     Review of Systems   Constitutional: Negative for fever. Skin: Positive for color change and wound. Except as noted above the remainder of the review of systems was reviewed and negative. PAST MEDICAL HISTORY     Past Medical History:   Diagnosis Date    Chronic back pain     Hypertension     IV drug abuse ([facility] [address] 2011    7 years clean         SURGICAL HISTORY       Past Surgical History:   Procedure Laterality Date    BACK SURGERY  2006    FOOT SURGERY Right     FRACTURE SURGERY Right     ankle    SPINE SURGERY           CURRENT MEDICATIONS       Previous Medications    BUPRENORPHINE-NALOXONE (SUBOXONE) 8-2 MG SUBL SL TABLET    Place 1 tablet under the tongue daily. .    CHLORTHALIDONE (HYGROTEN) 50 MG TABLET    Take 50 mg by mouth daily    ERGOCALCIFEROL (ERGOCALCIFEROL) 33436 UNITS CAPSULE    Take 50,000 Units by mouth once a week    FLUTICASONE FUROATE-VILANTEROL (BREO ELLIPTA) 200-25 MCG/INH AEPB INHALER    Inhale 1 puff into the lungs daily    GABAPENTIN (NEURONTIN) 600 MG TABLET    Take 600 mg by mouth 2 times daily.      LISINOPRIL (PRINIVIL;ZESTRIL) 20 MG TABLET    Take 1 tablet by mouth daily    NONFORMULARY    Indications: antidepressant     RANITIDINE (ZANTAC) 300 MG TABLET    Take 300 mg by mouth nightly       ALLERGIES     Patient has no known allergies.     FAMILY HISTORY       Family History   Problem Relation Age of Onset    High Blood Pressure Father     Diabetes Father           SOCIAL HISTORY       Social History     Socioeconomic History    Marital status: Single     Spouse name: Not on file    Number of children: Not on file    Years of education: Not on file    Highest education level: Not on file   Occupational History    Not on file   Social Needs    Financial resource strain: Not on file    Food insecurity:     Worry: Not on file     Inability: Not on file    Transportation needs:     Medical: Not on file     Non-medical: Not on file   Tobacco Use    Smoking status: Current Every Day Smoker     Packs/day: 1.00     Years: 8.00     Pack years: 8.00     Types: Cigarettes    Smokeless tobacco: Never Used   Substance and Sexual Activity    Alcohol use: Not Currently    Drug use: Yes     Types: Opiates     Sexual activity: Not on file   Lifestyle    Physical activity:     Days per week: Not on file     Minutes per session: Not on file    Stress: Not on file   Relationships    Social connections:     Talks on phone: Not on file     Gets together: Not on file     Attends [employer] service: Not on file     Active member of club or organization: Not on file     Attends meetings of clubs or organizations: Not on file     Relationship status: Not on file    Intimate partner violence:     Fear of current or ex partner: Not on file     Emotionally abused: Not on file     Physically abused: Not on file     Forced sexual activity: Not on file   Other Topics Concern    Not on file   Social History Narrative    Not on file       SCREENINGS             PHYSICAL EXAM    (up to 7 for level 4, 8 or more for level 5)     ED Triage Vitals   BP Temp Temp Source Pulse Resp SpO2 Height Weight   01/03/20 1805 01/03/20 1801 01/03/20 1801 01/03/20 1805 01/03/20 1805 01/03/20 1801 01/03/20 1805 01/03/20 1805   (!) 141/81 97.7 °F (36.5 °C) Oral 110 16 96 % 5' 11\" (1.803 m) 272 lb (123.4 kg)       Physical Exam  Vitals signs and nursing note reviewed. Skin:     Findings: Lesion present. Comments: There is a 4 cm diameter fluctuant nodule in the left antecubital area. Neurological:      Mental Status: He is alert. DIAGNOSTIC RESULTS     EKG: All EKG's are interpreted by the Emergency Department Physician who either signs or Co-signs this chart in the absence of a cardiologist.        RADIOLOGY:   Non-plain film images such as CT, Ultrasound and MRI are read by the radiologist. Plainradiographic images are visualized and preliminarily interpreted by the emergency physician with the below findings:        Interpretation per the Radiologist below, if available at the time of this note:    No orders to display         ED BEDSIDE ULTRASOUND:   Performed by ED Physician - none    LABS:  Labs Reviewed - No data to display    All other labs were within normal range or not returned as of this dictation. EMERGENCY DEPARTMENT COURSE and DIFFERENTIALDIAGNOSIS/MDM:   Vitals:    Vitals:    01/03/20 1801 01/03/20 1805   BP:  (!) 141/81   Pulse:  110   Resp:  16   Temp: 97.7 °F (36.5 °C) 97.9 °F (36.6 °C)   TempSrc: Oral Oral   SpO2: 96% 96%   Weight:  272 lb (123.4 kg)   Height:  5' 11\" (1.803 m)           CRITICALCARE TIME   Total Critical Care time was 0 minutes, excludingseparately reportable procedures. There was a high probabilityof clinically significant/life threatening deterioration in the patient's condition which required my urgent intervention. CONSULTS:  None    PROCEDURES:  After local anesthesia with 2 ML's 1% lidocaine, the wound was incised the #11 blade. A moderate amount of pus was expressed. Adhesions were lysed. A wick was placed and a pack of 4 x 4's and Kerlix wrapped around it. None    FINAL IMPRESSION      1. Abscess    2.  Cellulitis of left upper extremity DISPOSITION/PLAN   DISPOSITION Decision To Discharge 01/03/2020 06:26:18 PM      PATIENT REFERRED TO:  [facility]  [facility]   [facility]  [phone]    If symptoms worsen      DISCHARGE MEDICATIONS:  New Prescriptions    SULFAMETHOXAZOLE-TRIMETHOPRIM (BACTRIM DS;SEPTRA DS) 800-160 MG PER TABLET    Take 1 tablet by mouth 2 times daily for 7 days       (Please note that portions ofthis note were completed with a voice recognition program.  Efforts were made to edit the dictations but occasionally words are mis-transcribed.)    Ira Royal MD(electronically signed)  Attending Emergency Physician          Ira Royal MD  01/03/20 8139

## 2020-01-03 NOTE — ED TRIAGE NOTES
Pt iv drug user, states he missed about a week ago with his injection.   States that he noticed an abscess starting about 3 to 4 days ago

## 2020-01-14 ENCOUNTER — HOSPITAL ENCOUNTER (EMERGENCY)
Facility: HOSPITAL | Age: 50
Discharge: HOME | End: 2020-01-14
Attending: EMERGENCY MEDICINE
Payer: COMMERCIAL

## 2020-01-14 VITALS
SYSTOLIC BLOOD PRESSURE: 155 MMHG | WEIGHT: 268 LBS | BODY MASS INDEX: 37.52 KG/M2 | RESPIRATION RATE: 16 BRPM | HEIGHT: 71 IN | OXYGEN SATURATION: 96 % | DIASTOLIC BLOOD PRESSURE: 88 MMHG | TEMPERATURE: 97.6 F | HEART RATE: 110 BPM

## 2020-01-14 PROCEDURE — 99281 EMR DPT VST MAYX REQ PHY/QHP: CPT

## 2020-01-14 RX ORDER — ONDANSETRON 4 MG/1
4 TABLET, FILM COATED ORAL 3 TIMES DAILY PRN
Qty: 15 TABLET | Refills: 0 | Status: SHIPPED | OUTPATIENT
Start: 2020-01-14 | End: 2020-12-12

## 2020-01-14 RX ORDER — CLINDAMYCIN HYDROCHLORIDE 300 MG/1
300 CAPSULE ORAL 3 TIMES DAILY
Qty: 30 CAPSULE | Refills: 0 | Status: SHIPPED | OUTPATIENT
Start: 2020-01-14 | End: 2020-01-24

## 2020-01-14 ASSESSMENT — ENCOUNTER SYMPTOMS
NAUSEA: 0
TROUBLE SWALLOWING: 0
ABDOMINAL PAIN: 0
BACK PAIN: 0
SINUS PRESSURE: 0
SHORTNESS OF BREATH: 0
EYE REDNESS: 0
EYE PAIN: 0
COUGH: 0
EYE DISCHARGE: 0
DIARRHEA: 0
CHEST TIGHTNESS: 0
CONSTIPATION: 0
VOMITING: 0
WHEEZING: 0
SORE THROAT: 0
RHINORRHEA: 0

## 2020-01-14 NOTE — ED PROVIDER NOTES
[address] [place] [address] Court  eMERGENCY dEPARTMENT eNCOUnter      Pt Name: Neal Graham  MRN: [id number]  [place] 1970  Date of evaluation: 1/14/2020  Provider: Rosendo Randhawa MD    CHIEF COMPLAINT       Chief Complaint   Patient presents with    Insect Bite         HISTORY OF PRESENT ILLNESS   (Location/Symptom, Timing/Onset, Context/Setting, Quality, Duration, Modifying Factors, Severity)  Note limiting factors. Neal Graham is a 54 y.o. male who presents to the emergency department because of insect bite to left lower leg that will not heal up after a month of daily peroxide cleaning and application of neosporin ointment and now area red and infected. Nursing Notes were reviewed. REVIEW OF SYSTEMS    (2-9 systems for level 4, 10 or more forlevel 5)     Review of Systems   Constitutional: Negative for chills and fever. HENT: Negative for congestion, ear pain, postnasal drip, rhinorrhea, sinus pressure, sneezing, sore throat and trouble swallowing. Eyes: Negative for pain, discharge and redness. Respiratory: Negative for cough, chest tightness, shortness of breath and wheezing. Cardiovascular: Negative for chest pain, palpitations and leg swelling. Gastrointestinal: Negative for abdominal pain, constipation, diarrhea, nausea and vomiting. Genitourinary: Negative for dysuria, flank pain, frequency, hematuria and urgency. Musculoskeletal: Negative for back pain, joint swelling and neck pain. Skin: Negative for pallor and rash. Insect bite to left leg. Neurological: Negative for dizziness, syncope, weakness, numbness and headaches. Psychiatric/Behavioral: Negative for confusion and hallucinations. The patient is not nervous/anxious.             PAST MEDICAL HISTORY     Past Medical History:   Diagnosis Date    Chronic back pain     Hypertension     IV drug abuse ([facility] [address] 2011    7 years clean         SURGICAL HISTORY       Past Surgical History: Procedure Laterality Date    BACK SURGERY  2006    FOOT SURGERY Right     FRACTURE SURGERY Right     ankle    SPINE SURGERY           CURRENT MEDICATIONS       Previous Medications    BUPRENORPHINE-NALOXONE (SUBOXONE) 8-2 MG SUBL SL TABLET    Place 1 tablet under the tongue 2 times daily. CHLORTHALIDONE (HYGROTEN) 50 MG TABLET    Take 50 mg by mouth daily    ERGOCALCIFEROL (ERGOCALCIFEROL) 87259 UNITS CAPSULE    Take 50,000 Units by mouth once a week    FLUTICASONE FUROATE-VILANTEROL (BREO ELLIPTA) 200-25 MCG/INH AEPB INHALER    Inhale 1 puff into the lungs daily    GABAPENTIN (NEURONTIN) 600 MG TABLET    Take 600 mg by mouth 2 times daily. LISINOPRIL (PRINIVIL;ZESTRIL) 20 MG TABLET    Take 1 tablet by mouth daily    RANITIDINE (ZANTAC) 300 MG TABLET    Take 300 mg by mouth nightly       ALLERGIES     Patient has no known allergies.     FAMILY HISTORY       Family History   Problem Relation Age of Onset    High Blood Pressure Father     Diabetes Father           SOCIAL HISTORY       Social History     Socioeconomic History    Marital status: Single     Spouse name: None    Number of children: None    Years of education: None    Highest education level: None   Occupational History    None   Social Needs    Financial resource strain: None    Food insecurity:     Worry: None     Inability: None    Transportation needs:     Medical: None     Non-medical: None   Tobacco Use    Smoking status: Current Every Day Smoker     Packs/day: 1.00     Years: 8.00     Pack years: 8.00     Types: Cigarettes    Smokeless tobacco: Never Used   Substance and Sexual Activity    Alcohol use: Not Currently    Drug use: Yes     Types: Opiates     Sexual activity: None   Lifestyle    Physical activity:     Days per week: None     Minutes per session: None    Stress: None   Relationships    Social connections:     Talks on phone: None     Gets together: None     Attends Mormonism service: None     Active member of club or organization: None     Attends meetings of clubs or organizations: None     Relationship status: None    Intimate partner violence:     Fear of current or ex partner: None     Emotionally abused: None     Physically abused: None     Forced sexual activity: None   Other Topics Concern    None   Social History Narrative    None       SCREENINGS             PHYSICAL EXAM    (up to 7 for level 4, 8 or more for level 5)     ED Triage Vitals [01/14/20 1424]   BP Temp Temp Source Pulse Resp SpO2 Height Weight   (!) 155/88 97.6 °F (36.4 °C) Oral 110 16 96 % 5' 11\" (1.803 m) 268 lb (121.6 kg)       Physical Exam  Constitutional:       Appearance: He is well-developed. HENT:      Head: Normocephalic and atraumatic. Eyes:      Conjunctiva/sclera: Conjunctivae normal.      Pupils: Pupils are equal, round, and reactive to light. Neck:      Musculoskeletal: Neck supple. Cardiovascular:      Rate and Rhythm: Normal rate and regular rhythm. Pulmonary:      Effort: Pulmonary effort is normal.      Breath sounds: Normal breath sounds. Abdominal:      General: Bowel sounds are normal.      Palpations: Abdomen is soft. Musculoskeletal: Normal range of motion. Skin:     General: Skin is warm and dry. Comments: 2 cm diameter open infected wound with erythematous base to left lower extremity. Neurological:      Mental Status: He is alert and oriented to person, place, and time.          DIAGNOSTIC RESULTS     EKG: All EKG's are interpreted by the Emergency Department Physician who either signs or Co-signs this chart in the absence of a cardiologist.        RADIOLOGY:   Non-plain film images such as CT, Ultrasound and MRI are read by the radiologist. Plain radiographic images are visualized andpreliminarily interpreted by the emergency physician with the below findings:        Interpretationper the Radiologist below, if available at the time of this note:    No orders to display         ED BEDSIDE

## 2020-01-14 NOTE — ED TRIAGE NOTES
Pt arrival to ER with complaints of what he thinks was a spider bite. Pt states that he now has a hole in his left leg x few weeks. Pt has noted redness, swelling to the left leg.

## 2020-04-19 ENCOUNTER — HOSPITAL ENCOUNTER (EMERGENCY)
Facility: HOSPITAL | Age: 50
Discharge: HOME | End: 2020-04-19
Attending: FAMILY MEDICINE
Payer: COMMERCIAL

## 2020-04-19 VITALS
DIASTOLIC BLOOD PRESSURE: 113 MMHG | SYSTOLIC BLOOD PRESSURE: 190 MMHG | TEMPERATURE: 97.9 F | RESPIRATION RATE: 18 BRPM | OXYGEN SATURATION: 96 % | HEART RATE: 92 BPM | HEIGHT: 71 IN | BODY MASS INDEX: 37.8 KG/M2 | WEIGHT: 270 LBS

## 2020-04-19 PROCEDURE — 6370000000 HC RX 637 (ALT 250 FOR IP): Performed by: FAMILY MEDICINE

## 2020-04-19 PROCEDURE — 99281 EMR DPT VST MAYX REQ PHY/QHP: CPT

## 2020-04-19 RX ORDER — PREDNISONE 20 MG/1
40 TABLET ORAL DAILY
Qty: 10 TABLET | Refills: 0 | Status: SHIPPED | OUTPATIENT
Start: 2020-04-19 | End: 2020-04-24

## 2020-04-19 RX ORDER — DOXYCYCLINE HYCLATE 100 MG/1
100 CAPSULE ORAL ONCE
Status: DISCONTINUED | OUTPATIENT
Start: 2020-04-19 | End: 2020-04-19 | Stop reason: HOSPADM

## 2020-04-19 RX ORDER — SULFAMETHOXAZOLE AND TRIMETHOPRIM 800; 160 MG/1; MG/1
1 TABLET ORAL 2 TIMES DAILY
Qty: 14 TABLET | Refills: 0 | Status: SHIPPED | OUTPATIENT
Start: 2020-04-19 | End: 2020-04-26

## 2020-04-19 RX ORDER — PREDNISONE 20 MG/1
40 TABLET ORAL ONCE
Status: COMPLETED | OUTPATIENT
Start: 2020-04-19 | End: 2020-04-19

## 2020-04-19 RX ADMIN — PREDNISONE 40 MG: 20 TABLET ORAL at 19:05

## 2020-04-19 ASSESSMENT — PAIN DESCRIPTION - ONSET: ONSET: ON-GOING

## 2020-04-19 ASSESSMENT — ENCOUNTER SYMPTOMS
COUGH: 0
SHORTNESS OF BREATH: 0
COLOR CHANGE: 1
WHEEZING: 0

## 2020-04-19 ASSESSMENT — PAIN DESCRIPTION - ORIENTATION: ORIENTATION: RIGHT

## 2020-04-19 ASSESSMENT — PAIN DESCRIPTION - PAIN TYPE: TYPE: ACUTE PAIN

## 2020-04-19 ASSESSMENT — PAIN DESCRIPTION - LOCATION: LOCATION: HAND

## 2020-04-19 ASSESSMENT — PAIN DESCRIPTION - PROGRESSION: CLINICAL_PROGRESSION: GRADUALLY WORSENING

## 2020-04-19 ASSESSMENT — PAIN DESCRIPTION - DESCRIPTORS: DESCRIPTORS: TIGHTNESS

## 2020-04-19 ASSESSMENT — PAIN DESCRIPTION - FREQUENCY: FREQUENCY: CONTINUOUS

## 2020-04-19 ASSESSMENT — PAIN SCALES - GENERAL: PAINLEVEL_OUTOF10: 10

## 2020-04-19 NOTE — ED PROVIDER NOTES
(NEURONTIN) 600 MG TABLET    Take 600 mg by mouth 2 times daily. LISINOPRIL (PRINIVIL;ZESTRIL) 20 MG TABLET    Take 1 tablet by mouth daily    ONDANSETRON (ZOFRAN) 4 MG TABLET    Take 1 tablet by mouth 3 times daily as needed for Nausea or Vomiting    RANITIDINE (ZANTAC) 300 MG TABLET    Take 300 mg by mouth nightly       ALLERGIES     Patient has no known allergies.     FAMILY HISTORY       Family History   Problem Relation Age of Onset    High Blood Pressure Father     Diabetes Father           SOCIAL HISTORY       Social History     Socioeconomic History    Marital status: Single     Spouse name: None    Number of children: None    Years of education: None    Highest education level: None   Occupational History    None   Social Needs    Financial resource strain: None    Food insecurity     Worry: None     Inability: None    Transportation needs     Medical: None     Non-medical: None   Tobacco Use    Smoking status: Current Every Day Smoker     Packs/day: 1.00     Years: 8.00     Pack years: 8.00     Types: Cigarettes    Smokeless tobacco: Never Used   Substance and Sexual Activity    Alcohol use: Not Currently    Drug use: Not Currently    Sexual activity: None   Lifestyle    Physical activity     Days per week: None     Minutes per session: None    Stress: None   Relationships    Social connections     Talks on phone: None     Gets together: None     Attends Islam service: None     Active member of club or organization: None     Attends meetings of clubs or organizations: None     Relationship status: None    Intimate partner violence     Fear of current or ex partner: None     Emotionally abused: None     Physically abused: None     Forced sexual activity: None   Other Topics Concern    None   Social History Narrative    None       SCREENINGS             PHYSICAL EXAM    (up to 7 for level 4, 8 or more for level 5)     ED Triage Vitals [04/19/20 1817]   BP Temp Temp Source Pulse

## 2020-04-19 NOTE — ED NOTES
AVS and Rx reviewed with patient, understanding verbalized. Patient unable to sign AVS due to his right hand swollen. Patient discharged home with no further needs or concerns voiced. Follow up with pcp or return to ED if symptoms worsen.       Ana Mackenzie RN  04/19/20 4311

## 2020-04-21 ENCOUNTER — HOSPITAL ENCOUNTER (EMERGENCY)
Facility: HOSPITAL | Age: 50
Discharge: ANOTHER HOSPITAL | End: 2020-04-21
Attending: EMERGENCY MEDICINE
Payer: COMMERCIAL

## 2020-04-21 VITALS
BODY MASS INDEX: 37.8 KG/M2 | DIASTOLIC BLOOD PRESSURE: 87 MMHG | HEART RATE: 126 BPM | SYSTOLIC BLOOD PRESSURE: 166 MMHG | RESPIRATION RATE: 16 BRPM | TEMPERATURE: 98.2 F | WEIGHT: 270 LBS | OXYGEN SATURATION: 95 % | HEIGHT: 71 IN

## 2020-04-21 PROCEDURE — 99283 EMERGENCY DEPT VISIT LOW MDM: CPT

## 2020-04-21 ASSESSMENT — PAIN SCALES - GENERAL: PAINLEVEL_OUTOF10: 10

## 2020-04-21 NOTE — ED NOTES
Dr. Rl Felipe in to see pt. Explained to pt. That he will have to have surgery on his hand and he will have to go to [facility] for that. Pt. wants to go on to [facility] and to not have any labs,x-ray or IV's here. Pt. is in agreement to go to [facility]. Dr.Zachiarias on phone with [facility] and he has accepted pt's care. Pt.will go POV and have his sister take him.      Adrian Chapman, RN  04/21/20 5358

## 2020-04-21 NOTE — ED PROVIDER NOTES
[address] [facility] [address]  eMERGENCY dEPARTMENT eNCOUnter      Pt Name: Qiana Duffy  MRN: [id number]  YOB: 1970  Date ofevaluation: 2/27/4745  Provider: Last Membreno MD    CHIEF COMPLAINT       Chief Complaint   Patient presents with    Hand Pain     C/o right hand pain,swelling. Was seen here by Jojo Cheek 2 days ago. Pt. was told he had an insect bite. Was told to RTER if sx's worsen. HISTORY OF PRESENT ILLNESS  (Location/Symptom, Timing/Onset, Context/Setting, Quality, Duration, Modifying Factors, Severity.)   Qiana Duffy is a 52 y.o. male who presents to the emergency department with right hand pain, redness and swelling x 6-7 days. He was pulling weeds and then noticed a stinging sensation to his hand a little while later. He never saw anything sting him but told me that he has lots of brown recluses around his house and thinks one of them may have bitten him. He came to the ER on 4-19-20 and was prescribed Bactrim and Prednisone by the ER doc who saw him on that day. He is returning because the infection is much worse. Nursing notes were reviewed. REVIEW OF SYSTEMS    (2-9systems for level 4, 10 or more for level 5)   ROS:  General:  No fevers, no chills, no weakness  HEENT: No sore throat, runny nose or ear pain  Cardiovascular:  No chest pain, no palpitations  Respiratory:  No shortness of breath, no cough, no wheezing  Gastrointestinal:  No pain, no nausea, no vomiting, no diarrhea  Musculoskeletal:  + right hand infection. No muscle pain, no joint pain  Skin:  No rash, no easy bruising  Genitourinary:  No dysuria, no hematuria    Except as noted above theremainder of the review of systems was reviewed and negative.        PASTMEDICAL HISTORY     Past Medical History:   Diagnosis Date    Chronic back pain     Hypertension     IV drug abuse ([facility] [address] 2011    7 years clean         SURGICAL HISTORY       Past Surgical History:   Procedure radiographic images are visualized and preliminarily interpreted by the emergency physician with the below findings:      [] Radiologist's Report Reviewed:  No orders to display         ED BEDSIDE ULTRASOUND:   Performed by ED Physician - none    LABS:  Labs Reviewed - No data to display    I have reviewed and interpreted all of the currently available lab resultsfrom this visit (if applicable):  No results found for this visit on 04/21/20. All other labs were within normal range or not returned as of this dictation. EMERGENCY DEPARTMENT COURSE and DIFFERENTIAL DIAGNOSIS/MDM:   Vitals:    Vitals:    04/21/20 0633   BP: (!) 166/87   Pulse: 126   Resp: 16   Temp: 98.2 °F (36.8 °C)   TempSrc: Oral   SpO2: 95%   Weight: 270 lb (122.5 kg)   Height: 5' 11\" (1.803 m)       Patient refused to let us draw his blood, do xrays, put in IV or do any further treatment when I told him that he needed to see a hand surgeon. He refused any further treatment and said that he wanted to have his sister drive him to [facility]. [facility] for transfer. Dr. Saskia Puckett accepted the patient for transfer. Will goto the ER. CONSULTS:  None    PROCEDURES:  Procedures    CRITICAL CARE TIME    Total Critical Care time was 0 minutes, excluding separately reportable procedures. There was a high probability of clinically significant/life threatening deterioration in the patient's condition which required my urgent intervention. FINAL IMPRESSION      1. Cellulitis of right hand    2. Brown recluse spider bite or sting, accidental or unintentional, initial encounter          DISPOSITION/PLAN   DISPOSITION Decision To Transfer 04/21/2020 06:57:49 AM      PATIENT REFERRED TO:  No follow-up provider specified.     DISCHARGE MEDICATIONS:  New Prescriptions    No medications on file       Comment: Please note this report has been produced using speech recognition software and may contain errors related tothat system including errors in grammar, punctuation, and spelling, as well as words and phrases that may be inappropriate. If there are any questions or concerns please feel free to contact the dictating provider forclarification.     Bimal Sadler MD  Attending Emergency Physician                  Bimal Sadler MD  04/21/20 0375

## 2020-12-12 ENCOUNTER — HOSPITAL ENCOUNTER (EMERGENCY)
Facility: HOSPITAL | Age: 50
Discharge: HOME | End: 2020-12-12
Attending: HOSPITALIST
Payer: COMMERCIAL

## 2020-12-12 VITALS
OXYGEN SATURATION: 96 % | RESPIRATION RATE: 20 BRPM | WEIGHT: 270 LBS | SYSTOLIC BLOOD PRESSURE: 146 MMHG | HEART RATE: 94 BPM | BODY MASS INDEX: 36.57 KG/M2 | DIASTOLIC BLOOD PRESSURE: 92 MMHG | HEIGHT: 72 IN | TEMPERATURE: 97.2 F

## 2020-12-12 PROCEDURE — 87205 SMEAR GRAM STAIN: CPT

## 2020-12-12 PROCEDURE — 87076 CULTURE ANAEROBE IDENT EACH: CPT

## 2020-12-12 PROCEDURE — 99282 EMERGENCY DEPT VISIT SF MDM: CPT

## 2020-12-12 PROCEDURE — 87070 CULTURE OTHR SPECIMN AEROBIC: CPT

## 2020-12-12 PROCEDURE — 87075 CULTR BACTERIA EXCEPT BLOOD: CPT

## 2020-12-12 RX ORDER — SULFAMETHOXAZOLE AND TRIMETHOPRIM 800; 160 MG/1; MG/1
1 TABLET ORAL 2 TIMES DAILY
Qty: 20 TABLET | Refills: 0 | Status: SHIPPED | OUTPATIENT
Start: 2020-12-12 | End: 2020-12-22

## 2020-12-12 RX ORDER — ONDANSETRON 4 MG/1
4 TABLET, ORALLY DISINTEGRATING ORAL EVERY 4 HOURS PRN
Qty: 15 TABLET | Refills: 0 | Status: SHIPPED | OUTPATIENT
Start: 2020-12-12 | End: 2022-04-03

## 2020-12-12 ASSESSMENT — PAIN DESCRIPTION - FREQUENCY: FREQUENCY: INTERMITTENT

## 2020-12-12 ASSESSMENT — PAIN DESCRIPTION - PAIN TYPE: TYPE: ACUTE PAIN

## 2020-12-12 ASSESSMENT — PAIN DESCRIPTION - DESCRIPTORS: DESCRIPTORS: ACHING

## 2020-12-12 ASSESSMENT — PAIN DESCRIPTION - PROGRESSION: CLINICAL_PROGRESSION: GRADUALLY WORSENING

## 2020-12-12 ASSESSMENT — PAIN SCALES - GENERAL: PAINLEVEL_OUTOF10: 6

## 2020-12-12 ASSESSMENT — PAIN DESCRIPTION - ONSET: ONSET: ON-GOING

## 2020-12-12 ASSESSMENT — PAIN DESCRIPTION - LOCATION: LOCATION: KNEE

## 2020-12-12 NOTE — ED NOTES
AVS, Rx and wound care reviewed with patient, understanding verbalized. Patient discharged home with no further needs or concerns voiced. Follow up with pcp recommended.      Ritu Napier RN  12/12/20 9466

## 2020-12-12 NOTE — ED PROVIDER NOTES
[address] [facility] ENCOUNTER      Pt Name: Mariah Castleman  MRN: [id number]  YOB: 1970  Date of evaluation: 12/12/2020  Provider: Leyla Junior DO    CHIEF COMPLAINT     No chief complaint on file. HISTORY OF PRESENT ILLNESS  (Location/Symptom, Timing/Onset, Context/Setting, Quality, Duration, Modifying Factors, Severity.)   Mariah Castleman is a 52 y.o. male who presents to the emergency department for right leg wound. Patient states that 4 days ago he tripped over his transmission he was rebuilding in his garage and caused him to fall. He sort of cut his leg just below his right knee. Patient states that has been little tender and sore since then but yesterday it started draining. Patient states that drink quite a bit of fluid and has a foul terrible smell to it. He states it is only tender really around the area where the wound is he denies any streaking from the wound itself. Denies any fevers or chills that he is aware of. Denies any numbness tingling weakness of extremity out of ordinary. Patient was just afraid because of the wound infection and wanted it evaluated. Denies any other complaints at this time. Patient states that he has had a tetanus within the last 5 years. Nursing notes were reviewed.     REVIEW OFSYSTEMS    (2-9 systems for level 4, 10 or more for level 5)   ROS:  General:  No fevers, no chills, no weakness  Cardiovascular:  No chest pain, no palpitations  Respiratory:  No shortness of breath, no cough, no wheezing  Gastrointestinal:  No pain, no nausea, no vomiting, no diarrhea  Musculoskeletal:  No muscle pain, no joint pain  Skin:  No rash, no easy bruising, +open draining wound (right lower leg)  Neurologic:  No speech problems, no headache, no extremity weakness  Psychiatric:  No anxiety  Genitourinary:  No dysuria, no hematuria    Except as noted above the remainder of the review of systems was reviewed and negative. PAST MEDICAL HISTORY     Past Medical History:   Diagnosis Date    Chronic back pain     Hypertension     IV drug abuse ([facility] [address] 2011    7 years clean         SURGICAL HISTORY       Past Surgical History:   Procedure Laterality Date    BACK SURGERY  2006    FOOT SURGERY Right     FRACTURE SURGERY Right     ankle    SPINE SURGERY           CURRENT MEDICATIONS       Previous Medications    BUPRENORPHINE-NALOXONE (SUBOXONE) 8-2 MG SUBL SL TABLET    Place 1 tablet under the tongue 2 times daily. CHLORTHALIDONE (HYGROTEN) 50 MG TABLET    Take 50 mg by mouth daily    ERGOCALCIFEROL (ERGOCALCIFEROL) 63233 UNITS CAPSULE    Take 50,000 Units by mouth once a week    FLUTICASONE FUROATE-VILANTEROL (BREO ELLIPTA) 200-25 MCG/INH AEPB INHALER    Inhale 1 puff into the lungs daily    GABAPENTIN (NEURONTIN) 600 MG TABLET    Take 800 mg by mouth 3 times daily. ALLERGIES     Patient has no known allergies.     FAMILY HISTORY       Family History   Problem Relation Age of Onset    High Blood Pressure Father     Diabetes Father           SOCIAL HISTORY       Social History     Socioeconomic History    Marital status: Single     Spouse name: None    Number of children: None    Years of education: None    Highest education level: None   Occupational History    None   Social Needs    Financial resource strain: None    Food insecurity     Worry: None     Inability: None    Transportation needs     Medical: None     Non-medical: None   Tobacco Use    Smoking status: Current Every Day Smoker     Packs/day: 1.00     Years: 8.00     Pack years: 8.00     Types: Cigarettes    Smokeless tobacco: Never Used   Substance and Sexual Activity    Alcohol use: Not Currently    Drug use: Not Currently    Sexual activity: None   Lifestyle    Physical activity     Days per week: None     Minutes per session: None    Stress: None   Relationships    Social connections     Talks on phone: None normal, cognition is grossly normal. Affect is appropriate. DIAGNOSTIC RESULTS     EKG: All EKG's are interpreted by the Emergency Department Physician who either signs or Co-signs this chart in the [address] a cardiologist.        RADIOLOGY:   Non-plain film images such as CT, Ultrasound and MRI are read by the radiologist. Plain radiographic images are visualized and preliminarily interpreted by the emergency physician with the below findings:      ? Radiologist's Report Reviewed:  No orders to display         ED BEDSIDE ULTRASOUND:   Performed by ED Physician - none    LABS:    I have reviewed and interpreted all of the currently available lab results from this visit (ifapplicable):  No results found for this visit on 12/12/20. All other labs were within normal range or not returned as of this dictation. EMERGENCY DEPARTMENT COURSE and DIFFERENTIAL DIAGNOSIS/MDM:   Vitals:    Vitals:    12/12/20 1118   BP: (!) 146/92   Pulse: 94   Resp: 20   Temp: 97.2 °F (36.2 °C)   TempSrc: Temporal   SpO2: 96%   Weight: 270 lb (122.5 kg)   Height: 6' (1.829 m)       MEDICATIONS ADMINISTERED IN ED:  Medications - No data to display    After initial evaluation and examination I did have a conversation with the patient about the upcoming plan, treatment possible disposition which she was agreeable to the times dictation. Patient advised that since it is already draining we do not need to perform an I&D and it is draining quite a bit of copious amount of purulent material.  A culture was obtained patient advised that we would treat him like this was a staph infection. Patient was offered pain medication but declined he states he would only like to have something as far as an antibiotic and something for nausea. Patient advised that warm water soaks or warm compress to the area can help keep that abscess open and draining. Do as he has been with the dressing changes at home.   Patient be placed on Bactrim 1 tablet twice a day for 10 days. He will also be written a prescription for Zofran. Otherwise patient was given instructions if the symptoms worsens or new symptoms arise he should return back to emergency department for further evaluation work-up. Patient advised that upright take at least 3 to 5 days before he notices a change with the use of the antibiotic to continue with the warm water soaks and draining the area at home with slight palpation and \"milking of the area\". Patient has been doing this at prior to arrival and states it has been helping with the symptoms. Otherwise discharged home in stable condition at this time. The patient advised that he does need to follow-up with his regular family physician within the next 1 to 2 days reevaluation. Patient was also given instructions if the symptoms worsens or new symptoms arise he should return back to the emergency department for further evaluation work-up. CONSULTS:  None    PROCEDURES:  Procedures    CRITICAL CARE TIME    Total Critical Care time was 0 minutes, excluding separately reportable procedures. There was a high probability of clinically significant/life threatening deterioration in the patient's condition which required my urgent intervention. FINAL IMPRESSION      1. Cellulitis and abscess of leg          DISPOSITION/PLAN   DISPOSITION Decision To Discharge 12/12/2020 11:35:26 AM      PATIENT REFERRED TO:  Leandro Dotson [address] [place] [address]  [phone]    In 2 days      [facility]  [place] [address]   [facility]  [phone]    As needed, If symptoms worsen      DISCHARGE MEDICATIONS:  New Prescriptions    ONDANSETRON (ZOFRAN ODT) 4 MG DISINTEGRATING TABLET    Take 1 tablet by mouth every 4 hours as needed for Nausea or Vomiting    SULFAMETHOXAZOLE-TRIMETHOPRIM (BACTRIM DS) 800-160 MG PER TABLET    Take 1 tablet by mouth 2 times daily for 10 days       Comment:

## 2020-12-12 NOTE — ED TRIAGE NOTES
Patient states he has an \"infected cyst\" on his inner right knee, ongoing for a week. Started out as a red spot, now has drainage with foul smell.

## 2020-12-17 LAB
ANAEROBIC CULTURE: ABNORMAL
GRAM STAIN RESULT: ABNORMAL
ORGANISM: ABNORMAL
WOUND/ABSCESS: ABNORMAL

## 2021-03-19 ENCOUNTER — APPOINTMENT (OUTPATIENT)
Dept: PHARMACY | Facility: HOSPITAL | Age: 51
End: 2021-03-19

## 2021-04-02 ENCOUNTER — LAB (OUTPATIENT)
Dept: LAB | Facility: HOSPITAL | Age: 51
End: 2021-04-02

## 2021-04-02 ENCOUNTER — TELEPHONE (OUTPATIENT)
Dept: GASTROENTEROLOGY | Facility: CLINIC | Age: 51
End: 2021-04-02

## 2021-04-02 ENCOUNTER — DISEASE STATE MANAGEMENT VISIT (OUTPATIENT)
Dept: PHARMACY | Facility: HOSPITAL | Age: 51
End: 2021-04-02

## 2021-04-02 VITALS — TEMPERATURE: 97.1 F | HEART RATE: 77 BPM | DIASTOLIC BLOOD PRESSURE: 80 MMHG | SYSTOLIC BLOOD PRESSURE: 135 MMHG

## 2021-04-02 DIAGNOSIS — Z12.12 SCREENING FOR COLORECTAL CANCER: ICD-10-CM

## 2021-04-02 DIAGNOSIS — R30.0 DYSURIA: ICD-10-CM

## 2021-04-02 DIAGNOSIS — B18.2 CHRONIC HEPATITIS C WITHOUT HEPATIC COMA (HCC): Primary | ICD-10-CM

## 2021-04-02 DIAGNOSIS — R76.8 HEPATITIS B CORE ANTIBODY POSITIVE: ICD-10-CM

## 2021-04-02 DIAGNOSIS — F19.11 HISTORY OF DRUG ABUSE IN REMISSION (HCC): ICD-10-CM

## 2021-04-02 DIAGNOSIS — Z12.11 SCREENING FOR COLORECTAL CANCER: ICD-10-CM

## 2021-04-02 DIAGNOSIS — F10.21 HISTORY OF ALCOHOLISM (HCC): ICD-10-CM

## 2021-04-02 DIAGNOSIS — K21.9 GASTROESOPHAGEAL REFLUX DISEASE, UNSPECIFIED WHETHER ESOPHAGITIS PRESENT: ICD-10-CM

## 2021-04-02 DIAGNOSIS — B18.2 CHRONIC HEPATITIS C WITHOUT HEPATIC COMA (HCC): ICD-10-CM

## 2021-04-02 LAB
ALBUMIN SERPL-MCNC: 4.4 G/DL (ref 3.5–5.2)
ALBUMIN/GLOB SERPL: 1.4 G/DL
ALP SERPL-CCNC: 119 U/L (ref 39–117)
ALT SERPL W P-5'-P-CCNC: 40 U/L (ref 1–41)
AMPHET+METHAMPHET UR QL: NEGATIVE
AMPHETAMINES UR QL: NEGATIVE
ANION GAP SERPL CALCULATED.3IONS-SCNC: 14.7 MMOL/L (ref 5–15)
AST SERPL-CCNC: 45 U/L (ref 1–40)
BARBITURATES UR QL SCN: NEGATIVE
BENZODIAZ UR QL SCN: NEGATIVE
BILIRUB SERPL-MCNC: 0.5 MG/DL (ref 0–1.2)
BILIRUB UR QL STRIP: NEGATIVE
BUN SERPL-MCNC: 10 MG/DL (ref 6–20)
BUN/CREAT SERPL: 13.2 (ref 7–25)
BUPRENORPHINE SERPL-MCNC: POSITIVE NG/ML
CALCIUM SPEC-SCNC: 9.7 MG/DL (ref 8.6–10.5)
CANNABINOIDS SERPL QL: NEGATIVE
CHLORIDE SERPL-SCNC: 95 MMOL/L (ref 98–107)
CLARITY UR: CLEAR
CO2 SERPL-SCNC: 25.3 MMOL/L (ref 22–29)
COCAINE UR QL: NEGATIVE
COLOR UR: YELLOW
CREAT SERPL-MCNC: 0.76 MG/DL (ref 0.76–1.27)
DEPRECATED RDW RBC AUTO: 38.9 FL (ref 37–54)
ERYTHROCYTE [DISTWIDTH] IN BLOOD BY AUTOMATED COUNT: 12.8 % (ref 12.3–15.4)
GFR SERPL CREATININE-BSD FRML MDRD: 109 ML/MIN/1.73
GLOBULIN UR ELPH-MCNC: 3.1 GM/DL
GLUCOSE SERPL-MCNC: 88 MG/DL (ref 65–99)
GLUCOSE UR STRIP-MCNC: NEGATIVE MG/DL
HCT VFR BLD AUTO: 53.5 % (ref 37.5–51)
HGB BLD-MCNC: 19.4 G/DL (ref 13–17.7)
HGB UR QL STRIP.AUTO: NEGATIVE
KETONES UR QL STRIP: NEGATIVE
LEUKOCYTE ESTERASE UR QL STRIP.AUTO: NEGATIVE
MAGNESIUM SERPL-MCNC: 1.8 MG/DL (ref 1.6–2.6)
MCH RBC QN AUTO: 30.1 PG (ref 26.6–33)
MCHC RBC AUTO-ENTMCNC: 36.3 G/DL (ref 31.5–35.7)
MCV RBC AUTO: 82.9 FL (ref 79–97)
METHADONE UR QL SCN: NEGATIVE
NITRITE UR QL STRIP: NEGATIVE
OPIATES UR QL: NEGATIVE
OXYCODONE UR QL SCN: NEGATIVE
PCP UR QL SCN: NEGATIVE
PH UR STRIP.AUTO: 7 [PH] (ref 5–8)
PLATELET # BLD AUTO: 174 10*3/MM3 (ref 140–450)
PMV BLD AUTO: 10.7 FL (ref 6–12)
POTASSIUM SERPL-SCNC: 3.1 MMOL/L (ref 3.5–5.2)
PROPOXYPH UR QL: NEGATIVE
PROT SERPL-MCNC: 7.5 G/DL (ref 6–8.5)
PROT UR QL STRIP: NEGATIVE
RBC # BLD AUTO: 6.45 10*6/MM3 (ref 4.14–5.8)
SODIUM SERPL-SCNC: 135 MMOL/L (ref 136–145)
SP GR UR STRIP: 1.01 (ref 1–1.03)
TRICYCLICS UR QL SCN: NEGATIVE
UROBILINOGEN UR QL STRIP: NORMAL
WBC # BLD AUTO: 8.82 10*3/MM3 (ref 3.4–10.8)

## 2021-04-02 PROCEDURE — 83735 ASSAY OF MAGNESIUM: CPT

## 2021-04-02 PROCEDURE — 36415 COLL VENOUS BLD VENIPUNCTURE: CPT

## 2021-04-02 PROCEDURE — 81003 URINALYSIS AUTO W/O SCOPE: CPT

## 2021-04-02 PROCEDURE — 99244 OFF/OP CNSLTJ NEW/EST MOD 40: CPT | Performed by: PHYSICIAN ASSISTANT

## 2021-04-02 PROCEDURE — 80053 COMPREHEN METABOLIC PANEL: CPT

## 2021-04-02 PROCEDURE — 80306 DRUG TEST PRSMV INSTRMNT: CPT

## 2021-04-02 PROCEDURE — 85027 COMPLETE CBC AUTOMATED: CPT

## 2021-04-02 RX ORDER — ERGOCALCIFEROL 1.25 MG/1
50000 CAPSULE ORAL
COMMUNITY

## 2021-04-02 RX ORDER — POLYETHYLENE GLYCOL 3350 17 G/17G
17 POWDER, FOR SOLUTION ORAL DAILY
COMMUNITY

## 2021-04-02 RX ORDER — FAMOTIDINE 40 MG/1
40 TABLET, FILM COATED ORAL DAILY
COMMUNITY
End: 2021-05-05

## 2021-04-02 RX ORDER — ATENOLOL 50 MG/1
50 TABLET ORAL DAILY
COMMUNITY

## 2021-04-02 NOTE — PROGRESS NOTES
New Patient Consult      Date: 2021   Patient Name: Timbo Mckeon  MRN: [id number]  : 1970     Primary Care Provider: Yadira Morrissey NP-C  Referring Provider: Shweta    Chief Complaint   Patient presents with   • Hepatitis C   • Hepatitis B     History of Present Illness: Timbo Mckeon is a 51 y.o. male who is here today as a consultation with Gastroenterology for Hepatitis C.     He found out about having Hepatitis C infection approx 2 years ago. He has not had prior treatment for hepatitis. He has been clean now since . He has history of incarceration in . Reports no known personal history of liver disease including other viral hepatitis. He was told recently that he could also have Hepatitis B. There is no known family history of liver disease or cirrhosis. He admits to previous IVDU and intranasal drug use. He does have nonprofessional tattoos. Admits to having previous alcoholism, was drinking up to 90 cans of beer per day for 15-20 years. He stopped drinking after he was started on the suboxone. He does not currently drink alcohol. He denies current illicit drug use including marijuana. No recent liver imaging. He has had recent labs. He has not had previous vaccinations for Hepatitis A and B.     He has not had previous colonoscopy. There is no known family history of colon cancer or colon polyps. He takes miralax once daily due to constipation. No rectal bleeding. He has difficulty urinating, has urgency but only urinates a small amount. He does drink 12-18 [id number] per day. He has never had a prostate exam.     Subjective      Past Medical History:   Diagnosis Date   • Chronic back pain    • COPD (chronic obstructive pulmonary disease) (CMS/HCC)    • Hypertension    • Seizures (CMS/HCC)      Past Surgical History:   Procedure Laterality Date   • ANKLE SURGERY Right    • BACK SURGERY     • FOOT SURGERY Right    • SPINE SURGERY       Family History   Problem  Relation Age of Onset   • Diabetes Father    • Hypertension Father    • Cancer Mother      Social History     Socioeconomic History   • Marital status: Single     Spouse name: Not on file   • Number of children: Not on file   • Years of education: Not on file   • Highest education level: Not on file   Tobacco Use   • Smoking status: Current Every Day Smoker     Packs/day: 1.50     Years: 18.00     Pack years: 27.00     Types: Cigarettes   • Smokeless tobacco: Never Used   Vaping Use   • Vaping Use: Never used   Substance and Sexual Activity   • Alcohol use: No   • Drug use: Not Currently   • Sexual activity: Defer       Current Outpatient Medications:   •  atenolol (TENORMIN) 50 MG tablet, Take 50 mg by mouth Daily., Disp: , Rfl:   •  buprenorphine-naloxone (SUBOXONE) 8-2 MG per SL tablet, Place 2 tablets under the tongue Daily., Disp: , Rfl:   •  chlorthalidone (HYGROTEN) 50 MG tablet, Take 50 mg by mouth Daily., Disp: , Rfl:   •  DULERA 100-5 MCG/ACT inhaler, , Disp: , Rfl:   •  famotidine (PEPCID) 40 MG tablet, Take 40 mg by mouth Daily., Disp: , Rfl:   •  Fluticasone Furoate-Vilanterol (Breo Ellipta) 200-25 MCG/INH inhaler, Inhale 1 puff Daily., Disp: , Rfl:   •  gabapentin (NEURONTIN) 800 MG tablet, 800 mg 3 (Three) Times a Day., Disp: , Rfl:   •  polyethylene glycol (MiraLax) 17 g packet, Take 17 g by mouth Daily., Disp: , Rfl:   •  vitamin D (ERGOCALCIFEROL) 1.25 MG (84194 UT) capsule capsule, Take 50,000 Units by mouth Every 7 (Seven) Days., Disp: , Rfl:   •  lisinopril (PRINIVIL,ZESTRIL) 20 MG tablet, , Disp: , Rfl:     No Known Allergies    The following portions of the patient's history were reviewed and updated as appropriate: allergies, current medications, past family history, past medical history, past social history, past surgical history and problem list.    Objective     Physical Exam  Vitals reviewed.   Constitutional:       General: He is not in acute distress.     Appearance: Normal appearance.  He is well-developed. He is not ill-appearing or diaphoretic.   HENT:      Head: Normocephalic and atraumatic.      Right Ear: External ear normal.      Left Ear: External ear normal.      Nose: Nose normal.      Mouth/Throat:      Comments: Wearing a mask  Eyes:      General: No scleral icterus.        Right eye: No discharge.         Left eye: No discharge.      Conjunctiva/sclera: Conjunctivae normal.      Comments: glasses   Neck:      Vascular: No JVD.   Cardiovascular:      Rate and Rhythm: Normal rate and regular rhythm.      Heart sounds: Normal heart sounds. No murmur heard.   No friction rub. No gallop.    Pulmonary:      Effort: Pulmonary effort is normal. No respiratory distress.      Breath sounds: Normal breath sounds. No wheezing or rales.   Chest:      Chest wall: No tenderness.   Abdominal:      General: Bowel sounds are normal. There is no distension.      Palpations: Abdomen is soft. There is no mass.      Tenderness: There is no abdominal tenderness. There is no guarding.      Comments: Obese abdomen   Musculoskeletal:         General: Swelling (left hand) present. No deformity. Normal range of motion.      Cervical back: Normal range of motion.   Skin:     General: Skin is warm and dry.      Findings: Erythema (diffuse) present. No rash.   Neurological:      Mental Status: He is alert and oriented to person, place, and time.      Coordination: Coordination normal.   Psychiatric:         Mood and Affect: Mood normal.         Behavior: Behavior normal.         Thought Content: Thought content normal.         Judgment: Judgment normal.         Vital Signs:   Vitals:    04/02/21 1132   BP: 135/80   BP Location: Right arm   Patient Position: Sitting   Pulse: 77   Temp: 97.1 °F (36.2 °C)     Results Review:   I have reviewed the patient's new clinical and imaging results.    Lab on 04/02/2021   Component Date Value Ref Range Status   • WBC 04/02/2021 8.82  3.40 - 10.80 10*3/mm3 Final   • RBC  04/02/2021 6.45* 4.14 - 5.80 10*6/mm3 Final   • Hemoglobin 04/02/2021 19.4* 13.0 - 17.7 g/dL Final   • Hematocrit 04/02/2021 53.5* 37.5 - 51.0 % Final   • MCV 04/02/2021 82.9  79.0 - 97.0 fL Final   • MCH 04/02/2021 30.1  26.6 - 33.0 pg Final   • MCHC 04/02/2021 36.3* 31.5 - 35.7 g/dL Final   • RDW 04/02/2021 12.8  12.3 - 15.4 % Final   • RDW-SD 04/02/2021 38.9  37.0 - 54.0 fl Final   • MPV 04/02/2021 10.7  6.0 - 12.0 fL Final   • Platelets 04/02/2021 174  140 - 450 10*3/mm3 Final   • Glucose 04/02/2021 88  65 - 99 mg/dL Final   • BUN 04/02/2021 10  6 - 20 mg/dL Final   • Creatinine 04/02/2021 0.76  0.76 - 1.27 mg/dL Final   • Sodium 04/02/2021 135* 136 - 145 mmol/L Final   • Potassium 04/02/2021 3.1* 3.5 - 5.2 mmol/L Final    Slight hemolysis detected by analyzer. Results may be affected.   • Chloride 04/02/2021 95* 98 - 107 mmol/L Final   • CO2 04/02/2021 25.3  22.0 - 29.0 mmol/L Final   • Calcium 04/02/2021 9.7  8.6 - 10.5 mg/dL Final   • Total Protein 04/02/2021 7.5  6.0 - 8.5 g/dL Final   • Albumin 04/02/2021 4.40  3.50 - 5.20 g/dL Final   • ALT (SGPT) 04/02/2021 40  1 - 41 U/L Final   • AST (SGOT) 04/02/2021 45* 1 - 40 U/L Final    Slight hemolysis detected by analyzer. Results may be affected.   • Alkaline Phosphatase 04/02/2021 119* 39 - 117 U/L Final   • Total Bilirubin 04/02/2021 0.5  0.0 - 1.2 mg/dL Final   • eGFR Non African Amer 04/02/2021 109  >60 mL/min/1.73 Final   • Globulin 04/02/2021 3.1  gm/dL Final   • A/G Ratio 04/02/2021 1.4  g/dL Final   • BUN/Creatinine Ratio 04/02/2021 13.2  7.0 - 25.0 Final   • Anion Gap 04/02/2021 14.7  5.0 - 15.0 mmol/L Final   • THC, Screen, Urine 04/02/2021 Negative  Negative Final   • Phencyclidine (PCP), Urine 04/02/2021 Negative  Negative Final   • Cocaine Screen, Urine 04/02/2021 Negative  Negative Final   • Methamphetamine, Ur 04/02/2021 Negative  Negative Final   • Opiate Screen 04/02/2021 Negative  Negative Final   • Amphetamine Screen, Urine 04/02/2021 Negative   Negative Final   • Benzodiazepine Screen, Urine 04/02/2021 Negative  Negative Final   • Tricyclic Antidepressants Screen 04/02/2021 Negative  Negative Final   • Methadone Screen, Urine 04/02/2021 Negative  Negative Final   • Barbiturates Screen, Urine 04/02/2021 Negative  Negative Final   • Oxycodone Screen, Urine 04/02/2021 Negative  Negative Final   • Propoxyphene Screen 04/02/2021 Negative  Negative Final   • Buprenorphine, Screen, Urine 04/02/2021 Positive* Negative Final   • Magnesium 04/02/2021 1.8  1.6 - 2.6 mg/dL Final      No radiology results for the last 90 days.     Records from PCP report hepatitis B core total antibody positive.  No other recent labs available to review.    Assessment / Plan      1. Chronic hepatitis C without hepatic coma (CMS/HCC)  He has chronic hepatitis C infection, treatment naïve, without suspected cirrhosis.  Fibrosis score will be calculated to determine if any cirrhosis is present.  He will have labs for further evaluation.    More recommendations will be made after these results have been reviewed. He will continue to abstain from alcohol and illegal drugs. He will have US liver to determine if any lesions or other liver diseases present. Immunity to Hep A and B will be determined and vaccinations recommended if needed. After review of these results and discussion with with the patient, we will proceed with Hep C therapy and will submit Rx to insurance company for approval. Treatment will depend on fibrosis score and Hep C genotype. When approved, he will  Rx from our pharmacy and start taking exactly as directed. Hep C viral load lab (HCV RNA quant) and CMP will be checked 4 weeks after start of therapy, at end of therapy and 3 months s/p therapy to determine response to treatment and cure. He will call with concerns.     - CBC (No Diff); Future  - Comprehensive Metabolic Panel; Future  - HCV FibroSURE; Future  - HCV RNA By PCR, Qn Rfx Dee; Future  - Hepatitis A  Antibody, Total; Future  - Hepatitis B Core Antibody, Total; Future  - Hepatitis B Surface Antibody; Future  - HIV-1 / O / 2 Ag / Antibody 4th Generation; Future  - Protime-INR; Future  - Urine Drug Screen - Urine, Clean Catch; Future  - US Liver; Future  - Hepatitis B Surface Antigen    2. History of alcoholism (CMS/HCC)  He does report a past history of alcoholism.  Not currently drinking.  He was directed to continue abstinence from alcohol.    3. History of drug abuse in remission (CMS/HCC)  He has a long history of IV drug use.  He is not currently using any illicit drugs.  He takes Suboxone as recommended by his physician.  He understands that treatment of hepatitis C does not make him immune to any future infections if he continues to have risky behaviors.    4. Hepatitis B core antibody positive  He will have hepatitis B labs today including hepatitis B core total antibody, hepatitis B surface antibody hepatitis B surface antigen which will help us determine if he has any active hepatitis B infection.  Based on his history, if only has positive hepatitis B core total antibody positive, he has had a previous exposure to hepatitis B and no current infection.  - Hepatitis B Core Antibody, Total; Future  - Hepatitis B Surface Antibody; Future  - Hepatitis B Surface Antigen    5. Screening for colorectal cancer  He has never had a colonoscopy, age 51.  He needs colonoscopy for colorectal cancer screening.  We will arrange GI visit.     6. Gastroesophageal reflux disease, unspecified whether esophagitis present  He currently takes Pepcid 40 mg once daily without complete control of acid reflux symptoms.  We will discuss further at GI visit.    7. Dysuria  He complains of dysuria and decreased urine output.  He has never had a prostate exam.  He would like to be referred to urology.  Will have UA today.  He should discuss this further with his PCP as well.  - Urinalysis With Microscopic If Indicated (No Culture) -  Urine, Clean Catch; Future  - Ambulatory Referral to Urology        Follow Up:   Return in about 2 weeks (around 4/16/2021) for recheck Hep C.      Isabel Gonzalez PA-C  [facility]  4/2/2021  17:49 EDT    Please note that portions of this note may have been completed with a voice recognition program. Efforts were made to edit the dictations, but occasionally words are mistranscribed.

## 2021-04-06 ENCOUNTER — TELEPHONE (OUTPATIENT)
Dept: GASTROENTEROLOGY | Facility: CLINIC | Age: 51
End: 2021-04-06

## 2021-04-06 NOTE — TELEPHONE ENCOUNTER
Attempted to reach patient by telephone call to discuss scheduling a GI appt - no answer/vm not set up.

## 2021-04-06 NOTE — TELEPHONE ENCOUNTER
----- Message from Isabel Gonzalez PA-C sent at 4/2/2021  5:53 PM EDT -----  This pt needs GI appt arranged, please

## 2021-04-07 LAB
HBV SURFACE AB SER RIA-ACNC: REACTIVE
HBV SURFACE AG SERPL QL IA: NORMAL
HIV1 P24 AG SER QL: NORMAL
HIV1+2 AB SER QL: NORMAL
INR PPP: 0.84 (ref 0.9–1.1)
PROTHROMBIN TIME: 12 SECONDS (ref 12–15.1)

## 2021-04-07 PROCEDURE — 87340 HEPATITIS B SURFACE AG IA: CPT

## 2021-04-07 PROCEDURE — 85610 PROTHROMBIN TIME: CPT

## 2021-04-07 PROCEDURE — G0432 EIA HIV-1/HIV-2 SCREEN: HCPCS

## 2021-04-07 PROCEDURE — 86704 HEP B CORE ANTIBODY TOTAL: CPT

## 2021-04-07 PROCEDURE — 86708 HEPATITIS A ANTIBODY: CPT

## 2021-04-07 PROCEDURE — 87902 NFCT AGT GNTYP ALYS HEP C: CPT

## 2021-04-07 PROCEDURE — 87899 AGENT NOS ASSAY W/OPTIC: CPT

## 2021-04-07 PROCEDURE — 81596 NFCT DS CHRNC HCV 6 ASSAYS: CPT

## 2021-04-07 PROCEDURE — 86706 HEP B SURFACE ANTIBODY: CPT

## 2021-04-07 PROCEDURE — 36415 COLL VENOUS BLD VENIPUNCTURE: CPT

## 2021-04-07 PROCEDURE — 87522 HEPATITIS C REVRS TRNSCRPJ: CPT

## 2021-04-08 LAB
A2 MACROGLOB SERPL-MCNC: 272 MG/DL (ref 110–276)
ALT SERPL W P-5'-P-CCNC: 36 IU/L (ref 0–55)
APO A-I SERPL-MCNC: 115 MG/DL (ref 101–178)
BILIRUB SERPL-MCNC: 0.5 MG/DL (ref 0–1.2)
FIBROSIS SCORING:: ABNORMAL
FIBROSIS STAGE SERPL QL: ABNORMAL
GGT SERPL-CCNC: 87 IU/L (ref 0–65)
HAPTOGLOB SERPL-MCNC: 142 MG/DL (ref 23–355)
HAV AB SER QL IA: NEGATIVE
HBV CORE AB SERPL QL IA: POSITIVE
HCV AB SER QL: ABNORMAL
LABORATORY COMMENT REPORT: ABNORMAL
LIVER FIBR SCORE SERPL CALC.FIBROSURE: 0.53 (ref 0–0.21)
NECROINFLAMM ACTIVITY SCORING:: ABNORMAL
NECROINFLAMMATORY ACT GRADE SERPL QL: ABNORMAL
NECROINFLAMMATORY ACT SCORE SERPL: 0.25 (ref 0–0.17)
SERVICE CMNT-IMP: ABNORMAL

## 2021-04-09 LAB
HCV GENTYP SERPL NAA+PROBE: NORMAL
HCV GENTYP SERPL NAA+PROBE: NORMAL
HCV RNA SERPL NAA+PROBE-ACNC: NORMAL IU/ML
HCV RNA SERPL NAA+PROBE-LOG IU: 6.41 LOG10 IU/ML
LABORATORY COMMENT REPORT: NORMAL
REF LAB TEST REF RANGE: NORMAL

## 2021-04-16 ENCOUNTER — TELEPHONE (OUTPATIENT)
Dept: GASTROENTEROLOGY | Facility: CLINIC | Age: 51
End: 2021-04-16

## 2021-04-16 ENCOUNTER — DISEASE STATE MANAGEMENT VISIT (OUTPATIENT)
Dept: PHARMACY | Facility: HOSPITAL | Age: 51
End: 2021-04-16

## 2021-04-16 VITALS — SYSTOLIC BLOOD PRESSURE: 125 MMHG | HEART RATE: 78 BPM | TEMPERATURE: 98.4 F | DIASTOLIC BLOOD PRESSURE: 84 MMHG

## 2021-04-16 DIAGNOSIS — B18.2 CHRONIC HEPATITIS C WITHOUT HEPATIC COMA (HCC): Primary | ICD-10-CM

## 2021-04-16 DIAGNOSIS — R76.8 HEPATITIS B CORE ANTIBODY POSITIVE: ICD-10-CM

## 2021-04-16 PROCEDURE — 99214 OFFICE O/P EST MOD 30 MIN: CPT | Performed by: PHYSICIAN ASSISTANT

## 2021-04-16 RX ORDER — GLECAPREVIR AND PIBRENTASVIR 40; 100 MG/1; MG/1
3 TABLET, FILM COATED ORAL DAILY
Qty: 84 TABLET | Refills: 1
Start: 2021-04-16 | End: 2021-04-19 | Stop reason: SDUPTHER

## 2021-04-16 NOTE — TELEPHONE ENCOUNTER
Pt has chronic hepatitis C infection, genotype 1a, treatment naive, without cirrhosis (F2). I have prescribed Mavyret for treatment.

## 2021-04-16 NOTE — PROGRESS NOTES
Follow Up Note     Date: 2021   Patient Name: Timbo Mckeon  MRN: [id number]  : 1970     Primary Care Provider: Yadira Morrissey NP-C     Chief Complaint:    Chief Complaint   Patient presents with   • Hepatitis C     Pt here for 2 week follow up on labs     History of present illness:   2021  Timbo Mckeon is a 51 y.o. male who is here today for follow up regarding hepatitis C.     He completed labs last visit as directed.  He would like to be considered for hepatitis C therapy.  He continues to abstain from alcohol and illicit drug use.  He is feeling at his baseline today without any new complaints. He is scheduled to have US liver soon.     Interval History:  2021  He found out about having Hepatitis C infection approx 2 years ago. He has not had prior treatment for hepatitis. He has been clean now since . He has history of incarceration in . Reports no known personal history of liver disease including other viral hepatitis. He was told recently that he could also have Hepatitis B. There is no known family history of liver disease or cirrhosis. He admits to previous IVDU and intranasal drug use. He does have nonprofessional tattoos. Admits to having previous alcoholism, was drinking up to 90 cans of beer per day for 15-20 years. He stopped drinking after he was started on the suboxone. He does not currently drink alcohol. He denies current illicit drug use including marijuana. No recent liver imaging. He has had recent labs. He has not had previous vaccinations for Hepatitis A and B.      He has not had previous colonoscopy. There is no known family history of colon cancer or colon polyps. He takes miralax once daily due to constipation. No rectal bleeding. He has difficulty urinating, has urgency but only urinates a small amount. He does drink 12-18 [id number] per day. He has never had a prostate exam.     Subjective     Past Medical History:   Diagnosis Date   •  Chronic back pain    • COPD (chronic obstructive pulmonary disease) (CMS/HCC)    • Hypertension    • Seizures (CMS/HCC)      Past Surgical History:   Procedure Laterality Date   • ANKLE SURGERY Right    • BACK SURGERY  2006   • FOOT SURGERY Right    • SPINE SURGERY       Family History   Problem Relation Age of Onset   • Diabetes Father    • Hypertension Father    • Cancer Mother      Social History     Socioeconomic History   • Marital status: Single     Spouse name: Not on file   • Number of children: Not on file   • Years of education: Not on file   • Highest education level: Not on file   Tobacco Use   • Smoking status: Current Every Day Smoker     Packs/day: 1.50     Years: 18.00     Pack years: 27.00     Types: Cigarettes   • Smokeless tobacco: Never Used   Vaping Use   • Vaping Use: Never used   Substance and Sexual Activity   • Alcohol use: No   • Drug use: Not Currently   • Sexual activity: Defer       Current Outpatient Medications:   •  atenolol (TENORMIN) 50 MG tablet, Take 50 mg by mouth Daily., Disp: , Rfl:   •  buprenorphine-naloxone (SUBOXONE) 8-2 MG per SL tablet, Place 2 tablets under the tongue Daily., Disp: , Rfl:   •  chlorthalidone (HYGROTEN) 50 MG tablet, Take 50 mg by mouth Daily., Disp: , Rfl:   •  DULERA 100-5 MCG/ACT inhaler, , Disp: , Rfl:   •  famotidine (PEPCID) 40 MG tablet, Take 40 mg by mouth Daily., Disp: , Rfl:   •  Fluticasone Furoate-Vilanterol (Breo Ellipta) 200-25 MCG/INH inhaler, Inhale 1 puff Daily., Disp: , Rfl:   •  gabapentin (NEURONTIN) 800 MG tablet, 800 mg 2 (Two) Times a Day., Disp: , Rfl:   •  polyethylene glycol (MiraLax) 17 g packet, Take 17 g by mouth Daily., Disp: , Rfl:   •  Unable to find, 2 each Daily With Breakfast & Lunch. Hydroxycut, Disp: , Rfl:   •  vitamin D (ERGOCALCIFEROL) 1.25 MG (41368 UT) capsule capsule, Take 50,000 Units by mouth Every 7 (Seven) Days., Disp: , Rfl:   •  lisinopril (PRINIVIL,ZESTRIL) 20 MG tablet, , Disp: , Rfl:     No Known  Allergies    The following portions of the patient's history were reviewed and updated as appropriate: allergies, current medications, past family history, past medical history, past social history, past surgical history and problem list.    Objective     Physical Exam  Vitals reviewed.   Constitutional:       General: He is not in acute distress.     Appearance: Normal appearance. He is well-developed. He is not ill-appearing or diaphoretic.   HENT:      Head: Normocephalic and atraumatic.      Right Ear: External ear normal.      Left Ear: External ear normal.      Nose: Nose normal.      Mouth/Throat:      Comments: Wearing a mask  Eyes:      General: No scleral icterus.        Right eye: No discharge.         Left eye: No discharge.      Conjunctiva/sclera: Conjunctivae normal.      Comments: glasses   Neck:      Vascular: No JVD.   Cardiovascular:      Rate and Rhythm: Normal rate and regular rhythm.      Heart sounds: Normal heart sounds. No murmur heard.   No friction rub. No gallop.    Pulmonary:      Effort: Pulmonary effort is normal. No respiratory distress.      Breath sounds: Normal breath sounds. No wheezing or rales.   Chest:      Chest wall: No tenderness.   Abdominal:      General: Bowel sounds are normal. There is no distension.      Palpations: Abdomen is soft. There is no mass.      Tenderness: There is no abdominal tenderness. There is no guarding.      Comments: Obese abdomen   Musculoskeletal:         General: Swelling (left hand) present. No deformity. Normal range of motion.      Cervical back: Normal range of motion.   Skin:     General: Skin is warm and dry.      Findings: Erythema (diffuse) present. No rash.   Neurological:      Mental Status: He is alert and oriented to person, place, and time.      Coordination: Coordination normal.   Psychiatric:         Mood and Affect: Mood normal.         Behavior: Behavior normal.         Thought Content: Thought content normal.         Judgment:  Judgment normal.       Vitals:    04/16/21 1118   BP: 125/84   Pulse: 78   Temp: 98.4 °F (36.9 °C)       Results Review:   I reviewed the patient's new clinical results.    Lab on 04/02/2021   Component Date Value Ref Range Status   • Color, UA 04/02/2021 Yellow  Yellow, Straw Final   • Appearance, UA 04/02/2021 Clear  Clear Final   • pH, UA 04/02/2021 7.0  5.0 - 8.0 Final   • Specific Gravity, UA 04/02/2021 1.012  1.005 - 1.030 Final   • Glucose, UA 04/02/2021 Negative  Negative Final   • Ketones, UA 04/02/2021 Negative  Negative Final   • Bilirubin, UA 04/02/2021 Negative  Negative Final   • Blood, UA 04/02/2021 Negative  Negative Final   • Protein, UA 04/02/2021 Negative  Negative Final   • Leuk Esterase, UA 04/02/2021 Negative  Negative Final   • Nitrite, UA 04/02/2021 Negative  Negative Final   • Urobilinogen, UA 04/02/2021 1.0 E.U./dL  0.2 - 1.0 E.U./dL Final   • WBC 04/02/2021 8.82  3.40 - 10.80 10*3/mm3 Final   • RBC 04/02/2021 6.45* 4.14 - 5.80 10*6/mm3 Final   • Hemoglobin 04/02/2021 19.4* 13.0 - 17.7 g/dL Final   • Hematocrit 04/02/2021 53.5* 37.5 - 51.0 % Final   • MCV 04/02/2021 82.9  79.0 - 97.0 fL Final   • MCH 04/02/2021 30.1  26.6 - 33.0 pg Final   • MCHC 04/02/2021 36.3* 31.5 - 35.7 g/dL Final   • RDW 04/02/2021 12.8  12.3 - 15.4 % Final   • RDW-SD 04/02/2021 38.9  37.0 - 54.0 fl Final   • MPV 04/02/2021 10.7  6.0 - 12.0 fL Final   • Platelets 04/02/2021 174  140 - 450 10*3/mm3 Final   • Glucose 04/02/2021 88  65 - 99 mg/dL Final   • BUN 04/02/2021 10  6 - 20 mg/dL Final   • Creatinine 04/02/2021 0.76  0.76 - 1.27 mg/dL Final   • Sodium 04/02/2021 135* 136 - 145 mmol/L Final   • Potassium 04/02/2021 3.1* 3.5 - 5.2 mmol/L Final    Slight hemolysis detected by analyzer. Results may be affected.   • Chloride 04/02/2021 95* 98 - 107 mmol/L Final   • CO2 04/02/2021 25.3  22.0 - 29.0 mmol/L Final   • Calcium 04/02/2021 9.7  8.6 - 10.5 mg/dL Final   • Total Protein 04/02/2021 7.5  6.0 - 8.5 g/dL Final   •  Albumin 2021 4.40  3.50 - 5.20 g/dL Final   • ALT (SGPT) 2021 40  1 - 41 U/L Final   • AST (SGOT) 2021 45* 1 - 40 U/L Final    Slight hemolysis detected by analyzer. Results may be affected.   • Alkaline Phosphatase 2021 119* 39 - 117 U/L Final   • Total Bilirubin 2021 0.5  0.0 - 1.2 mg/dL Final   • eGFR Non African Amer 2021 109  >60 mL/min/1.73 Final   • Globulin 2021 3.1  gm/dL Final   • A/G Ratio 2021 1.4  g/dL Final   • BUN/Creatinine Ratio 2021 13.2  7.0 - 25.0 Final   • Anion Gap 2021 14.7  5.0 - 15.0 mmol/L Final   • Fibrosis Score 2021 0.53* 0.00 - 0.21 Final   • Fibrosis Stage 2021 Comment   Final                F2 - Bridging fibrosis with few septa   • Necroinflammat Activity Score 2021 0.25* 0.00 - 0.17 Final   • Necroinflammat Activity Grade 2021 A0-A1   Final   • Alpha 2-Macroglobulins, Qn 2021 272  110 - 276 mg/dL Final   • Haptoglobin 2021 142  23 - 355 mg/dL Final   • Apolipoprotein A-1 2021 115  101 - 178 mg/dL Final   • Total Bilirubin 2021 0.5  0.0 - 1.2 mg/dL Final   • GGT 2021 87* 0 - 65 IU/L Final   • ALT (SGPT) 2021 36  0 - 55 IU/L Final   • HCV Qual Interp 2021 Comment   Final    Quantitative results of 6 biochemical tests are analyzed using  a computational algorithm to provide a quantitative surrogate  marker (0.0-1.0) for liver fibrosis (METAVIR F0-F4) and for  necroinflammatory activity (METAVIR A0-A3).   • Fibrosis Scorin2021 Comment   Final          <0.21 = Stage F0 - No fibrosis  0.21 - 0.27 = Stage F0 - F1  0.27 - 0.31 = Stage F1 - Portal fibrosis  0.31 - 0.48 = Stage F1 - F2  0.48 - 0.58 = Stage F2 - Bridging fibrosis with few septa  0.58 - 0.72 = Stage F3 - Bridging fibrosis with many septa  0.72 - 0.74 = Stage F3 - F4        >0.74 = Stage F4 - Cirrhosis   • Necroinflamm Activity Scorin2021 Comment   Final          <0.17 = Grade A0 - No  Activity  0.17 - 0.29 = Grade A0 - A1  0.29 - 0.36 = Grade A1 - Minimal activity  0.36 - 0.52 = Grade A1 - A2  0.52 - 0.60 = Grade A2 - Moderate activity  0.60 - 0.62 = Grade A2 - A3        >0.62 = Grade A3 - Severe activity   • Limitations: 04/07/2021 Comment   Final    The negative predictive value of a Fibrotest score <0.31 (absence of  clinically significant fibrosis) was 85% when compared to liver biopsy  in 1,270 HCV infected patients with a 38% prevalence of significant  liver fibrosis (F2, 3 or 4). The positive predictive value of a Fibro-  test score >0.48 (F2, 3, 4) was 61% in that same patient cohort. HCV  FibroSURE is not recommended in patients with Gilbert Disease, acute  hemolysis (e.g. HCV ribavirin therapy mediated hemolysis) acute hepa-  titis of the liver, extra-hepatic cholestasis, transplant patients,  and/or renal insufficiency patients.  Any of these clinical situations  may lead to inaccurate quantitative predictions of fibrosis and  necroinflammatory activity in the liver.   • Comment 04/07/2021 Comment   Final    This test was developed and its performance characteristics determined  by [facility].  It has not been cleared or approved by the Food and Drug  Administration.  The FDA has determined that such clearance or  approval is not necessary.  For questions regarding this report please contact customer service  at [phone].   • Hepatitis C Quantitation 04/07/2021 [id number]  IU/mL Final   • HCV log10 04/07/2021 6.405  log10 IU/mL Final   • HCV Test Information 04/07/2021 Comment   Final    The quantitative range of this assay is 15 IU/mL to 100 million IU/mL.   • HCV Genotype 04/07/2021 Comment   Final    To be performed on this specimen.   • Hep A Total Ab 04/07/2021 Negative  Negative Final   • Hep B Core Total Ab 04/07/2021 Positive* Negative Final   • Hep B S Ab 04/07/2021 Reactive* Non-Reactive Final   • HIV-1/ HIV-2 Ab 04/07/2021 Non-Reactive  Non-Reactive Final   • HIV-1 P24 Ag  Screen 04/07/2021 Non-Reactive  Non-Reactive Final   • Protime 04/07/2021 12.0  12.0 - 15.1 Seconds Final   • INR 04/07/2021 0.84* 0.90 - 1.10 Final   • THC, Screen, Urine 04/02/2021 Negative  Negative Final   • Phencyclidine (PCP), Urine 04/02/2021 Negative  Negative Final   • Cocaine Screen, Urine 04/02/2021 Negative  Negative Final   • Methamphetamine, Ur 04/02/2021 Negative  Negative Final   • Opiate Screen 04/02/2021 Negative  Negative Final   • Amphetamine Screen, Urine 04/02/2021 Negative  Negative Final   • Benzodiazepine Screen, Urine 04/02/2021 Negative  Negative Final   • Tricyclic Antidepressants Screen 04/02/2021 Negative  Negative Final   • Methadone Screen, Urine 04/02/2021 Negative  Negative Final   • Barbiturates Screen, Urine 04/02/2021 Negative  Negative Final   • Oxycodone Screen, Urine 04/02/2021 Negative  Negative Final   • Propoxyphene Screen 04/02/2021 Negative  Negative Final   • Buprenorphine, Screen, Urine 04/02/2021 Positive* Negative Final   • Magnesium 04/02/2021 1.8  1.6 - 2.6 mg/dL Final   • Hepatitis C Genotype 04/07/2021 1a   Final   • Please note 04/07/2021 Comment   Final    This test was developed and its performance characteristics determined  by [facility].  It has not been cleared or approved by the [place] Food and  Drug Administration.  The FDA has determined that such clearance or approval is not  necessary. This test is used for clinical purposes.  It should not be  regarded as investigational or for research.   Disease State Management Visit on 04/02/2021   Component Date Value Ref Range Status   • Hepatitis B Surface Ag 04/07/2021 Non-Reactive  Non-Reactive Final      No radiology results for the last 90 days.     Records from PCP report hepatitis B core total antibody positive on note.    Assessment / Plan      1. Chronic hepatitis C without hepatic coma, CTP class A. fibrosis stage F2, genotype 1a.  4/16/2021  He has chronic Hepatitis C infection, treatment naive, without  cirrhosis. I have perscribed Mavyret for 8 weeks for Hepatitis C therapy. He will take this medication at the same time every day without missing any doses. We had a discussion on treatment course, possible medication adverse reactions and follow-up during the treatment and after treatment. Hep C viral load lab (HCV RNA quant) and CMP will be checked 4 weeks after start of therapy, at end of therapy and 3 months s/p therapy to determine response to treatment and cure. He will continue to abstain from alcohol use at this time and agrees not to use illegal drugs. He understands to avoid any high risk behavior to prevent any reinfection during treatment and after treatment.  Rx- Glecaprevir-Pibrentasvir (Mavyret) 100-40 MG tablet, Take 3 tablets by mouth Daily for 8 weeks. Disp: 84 tablet, Rfl: 1    He is immune to hepatitis A and B and no vaccinations are needed.   Source of hepatitis C infection is likely his past IVDU. He is no longer using IV or intranasal drugs.   Liver imaging planned and scheduled.   HIV test is negative. No history of liver transplant.   Follow-up in 4 weeks time for discussion regarding medication adherence and will have labs after next visit.    4/2/2021  More recommendations will be made after these results have been reviewed. He will continue to abstain from alcohol and illegal drugs. He will have US liver to determine if any lesions or other liver diseases present. Immunity to Hep A and B will be determined and vaccinations recommended if needed. After review of these results and discussion with with the patient, we will proceed with Hep C therapy and will submit Rx to insurance company for approval. Treatment will depend on fibrosis score and Hep C genotype. When approved, he will  Rx from our pharmacy and start taking exactly as directed. Hep C viral load lab (HCV RNA quant) and CMP will be checked 4 weeks after start of therapy, at end of therapy and 3 months s/p therapy to  determine response to treatment and cure. He will call with concerns.     2. Hepatitis B core antibody positive  4/16/2021  He has negative Hepatitis B surface antigen which indicates no present Hepatitis B infection. He has hepatitis B core total ab positive which indicates past infection. He will monitor for any symptoms which could correlate with reactivation of Hepatitis B while taking Hepatitis C therapy such as jaundice, abdominal pain, nausea, vomiting, diarrhea, fatigue, etc. He will report to ED if these occur.     4/2/2021  He will have hepatitis B labs today including hepatitis B core total antibody, hepatitis B surface antibody hepatitis B surface antigen which will help us determine if he has any active hepatitis B infection.  Based on his history, if only has positive hepatitis B core total antibody positive, he has had a previous exposure to hepatitis B and no current infection.        Prior history:  4/2/2021  History of alcoholism   He does report a past history of alcoholism.  Not currently drinking.  He was directed to continue abstinence from alcohol.     History of drug abuse in remission   He has a long history of IV drug use.  He is not currently using any illicit drugs.  He takes Suboxone as recommended by his physician.  He understands that treatment of hepatitis C does not make him immune to any future infections if he continues to have risky behaviors.    Screening for colorectal cancer  He has never had a colonoscopy, age 51.  He needs colonoscopy for colorectal cancer screening.  We will arrange GI visit.      Gastroesophageal reflux disease, unspecified whether esophagitis present  He currently takes Pepcid 40 mg once daily without complete control of acid reflux symptoms.  We will discuss further at GI visit.     Dysuria  He complains of dysuria and decreased urine output.  He has never had a prostate exam.  He would like to be referred to urology.  Will have UA today.  He should  discuss this further with his PCP as well.         Follow Up:   Return in about 1 month (around 5/16/2021) for recheck Hep C.      Isabel Gonzalez PA-C  [facility]  4/19/2021  08:56 EDT    Please note that portions of this note may have been completed with a voice recognition program. Efforts were made to edit the dictations, but occasionally words are mistranscribed.

## 2021-04-17 ENCOUNTER — HOSPITAL ENCOUNTER (EMERGENCY)
Facility: HOSPITAL | Age: 51
Discharge: HOME | End: 2021-04-17
Attending: EMERGENCY MEDICINE
Payer: COMMERCIAL

## 2021-04-17 ENCOUNTER — APPOINTMENT (OUTPATIENT)
Dept: GENERAL RADIOLOGY | Facility: HOSPITAL | Age: 51
End: 2021-04-17
Payer: COMMERCIAL

## 2021-04-17 VITALS
SYSTOLIC BLOOD PRESSURE: 137 MMHG | DIASTOLIC BLOOD PRESSURE: 82 MMHG | TEMPERATURE: 97.1 F | HEART RATE: 81 BPM | HEIGHT: 71 IN | BODY MASS INDEX: 35 KG/M2 | RESPIRATION RATE: 20 BRPM | OXYGEN SATURATION: 98 % | WEIGHT: 250 LBS

## 2021-04-17 DIAGNOSIS — L03.011 CELLULITIS OF FINGER OF RIGHT HAND: Primary | ICD-10-CM

## 2021-04-17 PROCEDURE — 99284 EMERGENCY DEPT VISIT MOD MDM: CPT

## 2021-04-17 PROCEDURE — 6370000000 HC RX 637 (ALT 250 FOR IP): Performed by: EMERGENCY MEDICINE

## 2021-04-17 PROCEDURE — 73140 X-RAY EXAM OF FINGER(S): CPT

## 2021-04-17 RX ORDER — IBUPROFEN 600 MG/1
600 TABLET ORAL ONCE
Status: COMPLETED | OUTPATIENT
Start: 2021-04-17 | End: 2021-04-17

## 2021-04-17 RX ORDER — SULFAMETHOXAZOLE AND TRIMETHOPRIM 800; 160 MG/1; MG/1
1 TABLET ORAL 2 TIMES DAILY
Qty: 14 TABLET | Refills: 0 | Status: SHIPPED | OUTPATIENT
Start: 2021-04-17 | End: 2021-04-24

## 2021-04-17 RX ORDER — CEPHALEXIN 500 MG/1
500 CAPSULE ORAL 4 TIMES DAILY
Qty: 28 CAPSULE | Refills: 0 | Status: SHIPPED | OUTPATIENT
Start: 2021-04-17 | End: 2021-04-24

## 2021-04-17 RX ORDER — CEPHALEXIN 500 MG/1
500 CAPSULE ORAL ONCE
Status: COMPLETED | OUTPATIENT
Start: 2021-04-17 | End: 2021-04-17

## 2021-04-17 RX ORDER — SULFAMETHOXAZOLE AND TRIMETHOPRIM 800; 160 MG/1; MG/1
1 TABLET ORAL ONCE
Status: COMPLETED | OUTPATIENT
Start: 2021-04-17 | End: 2021-04-17

## 2021-04-17 RX ADMIN — IBUPROFEN 600 MG: 600 TABLET ORAL at 17:03

## 2021-04-17 RX ADMIN — SULFAMETHOXAZOLE AND TRIMETHOPRIM 1 TABLET: 800; 160 TABLET ORAL at 17:03

## 2021-04-17 RX ADMIN — CEPHALEXIN 500 MG: 500 CAPSULE ORAL at 17:03

## 2021-04-17 ASSESSMENT — PAIN SCALES - GENERAL: PAINLEVEL_OUTOF10: 8

## 2021-04-17 ASSESSMENT — PAIN DESCRIPTION - PAIN TYPE: TYPE: ACUTE PAIN

## 2021-04-17 NOTE — ED NOTES
AVS and Rx reviewed with patient, understanding verbalized. Patient discharged home with no further needs or concerns voiced. Follow up with pcp / ortho recommended.      Kay Egan RN  04/17/21 [id number]

## 2021-04-17 NOTE — ED PROVIDER NOTES
[facility] ENCOUNTER      Pt Name: Marie Vicente  MRN: [id number]  [place] 1970  Date of evaluation: 4/17/2021  Provider: Isreal Harding DO    CHIEF COMPLAINT       Chief Complaint   Patient presents with    Finger Pain         HISTORY OF PRESENT ILLNESS   (Location/Symptom, Timing/Onset, Context/Setting, Quality, Duration, Modifying Factors, Severity)  Note limiting factors. Marie Vicente is a 54 y.o. male who presents to the emergency department with complaint of right second digit swelling and pain. Patient reports about a week ago he stabbed his finger accidentally with a stapler. Reports this morning noted he started having swelling pain and redness at the proximal portion of his second digit on his right hand. Reports with increased swelling and pain he came to the emergency room for further evaluation. Has history of hand infection in the past requiring incision and drainage by hand surgeon at [facility]. Denies history of diabetes. Does have history of IV drug abuse but states he has not used in many years. Denies fever, night sweats or chills. Appropriate PPE was used including an eye shield, gloves, N95 mask, surgical mask during the entire patient encounter and exam.  If necessary (pt being intubated or aerosolizing equipment in use) a gown was also used. Nursing Notes were reviewed. PAST MEDICAL HISTORY     Past Medical History:   Diagnosis Date    Chronic back pain     Hypertension     IV drug abuse ([facility] [address] 2011    7 years clean         SURGICAL HISTORY       Past Surgical History:   Procedure Laterality Date    BACK SURGERY  2006    FOOT SURGERY Right     FRACTURE SURGERY Right     ankle    SPINE SURGERY           CURRENT MEDICATIONS       Previous Medications    BUPRENORPHINE-NALOXONE (SUBOXONE) 8-2 MG SUBL SL TABLET    Place 1 tablet under the tongue 2 times daily.      CHLORTHALIDONE (HYGROTEN) 50 MG TABLET    Take 50 mg by mouth daily    ERGOCALCIFEROL (ERGOCALCIFEROL) 14586 UNITS CAPSULE    Take 50,000 Units by mouth once a week    FLUTICASONE FUROATE-VILANTEROL (BREO ELLIPTA) 200-25 MCG/INH AEPB INHALER    Inhale 1 puff into the lungs daily    GABAPENTIN (NEURONTIN) 600 MG TABLET    Take 800 mg by mouth 3 times daily. ONDANSETRON (ZOFRAN ODT) 4 MG DISINTEGRATING TABLET    Take 1 tablet by mouth every 4 hours as needed for Nausea or Vomiting       ALLERGIES     Patient has no known allergies.     FAMILY HISTORY       Family History   Problem Relation Age of Onset    High Blood Pressure Father     Diabetes Father           SOCIAL HISTORY       Social History     Socioeconomic History    Marital status: Single     Spouse name: None    Number of children: None    Years of education: None    Highest education level: None   Occupational History    None   Social Needs    Financial resource strain: None    Food insecurity     Worry: None     Inability: None    Transportation needs     Medical: None     Non-medical: None   Tobacco Use    Smoking status: Current Every Day Smoker     Packs/day: 1.00     Years: 8.00     Pack years: 8.00     Types: Cigarettes    Smokeless tobacco: Never Used   Substance and Sexual Activity    Alcohol use: Not Currently    Drug use: Not Currently    Sexual activity: None   Lifestyle    Physical activity     Days per week: None     Minutes per session: None    Stress: None   Relationships    Social connections     Talks on phone: None     Gets together: None     Attends Rastafarian service: None     Active member of club or organization: None     Attends meetings of clubs or organizations: None     Relationship status: None    Intimate partner violence     Fear of current or ex partner: None     Emotionally abused: None     Physically abused: None     Forced sexual activity: None   Other Topics Concern    None   Social History Narrative    None SCREENINGS               Review of Systems  Constitutional:  Denies fever, chills  Eyes: denies eye problems  HEENT: denies sore throat or ear pain  Respiratory: denies cough or shortness of breath  Cardiovascular: denies chest pain, palpitations  GI: denies abdominal pain, nausea, vomiting, or diarrhea  Musculoskeletal: Reports swelling and pain to right second digit  Skin: denies rash  Neurologic: denies focal weakness or sensory changes    Except as noted above the remainder of the review of systems was reviewed and negative. PHYSICAL EXAM    (up to 7 for level 4, 8 or more for level 5)     ED Triage Vitals [04/17/21 1605]   BP Temp Temp Source Pulse Resp SpO2 Height Weight   137/82 97.1 °F (36.2 °C) Temporal 86 18 96 % 5' 11\" (1.803 m) 250 lb (113.4 kg)       General appearance: well-developed, well-nourished, no acute distress, nontoxic appearance  Neck: normal range of motion, no tenderness, trachea midline, no stridor  Respiratory: normal breath sounds, non labored breathing pattern  Cardiovascular: normal heart rate, normal rhythm  GI: nontender, bowel sounds normal, soft, nondistended, no pulsatile masses  Musculoskeletal: no edema, intact distal pulses, no clubbing, cyanosis. Good range of motion  Back: no tenderness  Integument: Patient with swelling tenderness and redness to the proximal portion of the right second digit of his right hand. There is no fluctuance or crepitus. Cap refill less than 2 seconds. Compartments still soft. Mild erythema extending into the dorsum of the right hand. There is no fusiform swelling of the joint, not held in passive flexion, able to extend the finger with ease. There is no involvement of the distal 50% of the finger. , no rash, < 2 second cap refill  Neurologic: alert and oriented ×3, no focal deficits appreciated    DIAGNOSTIC RESULTS         RADIOLOGY:   Interpretation per the Radiologist below, if available at the time of this note:    XR FINGER RIGHT (MIN 2 VIEWS)   Final Result   Impression:    No acute osseous abnormality. Soft tissue swelling. LABS:  Labs Reviewed - No data to display    All other labs were within normal range or not returned as of this dictation. EMERGENCY DEPARTMENT COURSE and DIFFERENTIAL DIAGNOSIS/MDM:   Vitals:    Vitals:    04/17/21 1605   BP: 137/82   Pulse: 86   Resp: 18   Temp: 97.1 °F (36.2 °C)   TempSrc: Temporal   SpO2: 96%   Weight: 250 lb (113.4 kg)   Height: 5' 11\" (1.803 m)         MDM  57-year-old male presents the emergency department complaint of swelling, pain and redness to the proximal portion of his right second digit of right hand. Vital signs stable. Physical exam as above. He is alert awake and overall appears nontoxic. Patient afebrile. Not a diabetic and not on immunosuppressive therapy. Does have signs of cellulitis but no obvious signs of flexor tenosynovitis at this time. He does not appear septic. Compartments soft and he is neurovascular intact distally. We will get an x-ray to evaluate for any deeper soft tissue infection and will give a dose of Bactrim and Keflex here. Tetanus is already up-to-date. X-ray shows edema but no gas or other acute abnormality. At this point patient does appear stable for discharge. Will discharge home on Bactrim and Keflex with strict return precautions if he has worsening swelling, pain or redness. Patient agrees with discharge plan. CONSULTS:  None      FINAL IMPRESSION      1.  Cellulitis of finger of right hand          DISPOSITION/PLAN   DISPOSITION Decision To Discharge 04/17/2021 05:28:44 PM      PATIENT REFERRED TO:  WOODY Sorensen - CNP  [address] [facility]  [phone]    Schedule an appointment as soon as possible for a visit in 2 days  As needed, If symptoms worsen    Rina Lay MD  [address]  [phone]    Schedule an appointment as soon as possible for a visit in 1 week  If symptoms worsen, As needed      DISCHARGE MEDICATIONS:  New Prescriptions    CEPHALEXIN (KEFLEX) 500 MG CAPSULE    Take 1 capsule by mouth 4 times daily for 7 days    SULFAMETHOXAZOLE-TRIMETHOPRIM (BACTRIM DS) 800-160 MG PER TABLET    Take 1 tablet by mouth 2 times daily for 7 days          (Please note that portions of this note were completed with a voice recognition program.  Efforts were made to edit the dictations but occasionally words are mis-transcribed.)    Ritesh Mcwilliams DO (electronically signed)  Attending Emergency Physician      Ritesh Mcwilliams DO  04/17/21 1996

## 2021-04-17 NOTE — ED TRIAGE NOTES
Patient states that he woke up this morning and his finger was hurting to the point where he couldn't move it. Patient denies injury to the area.

## 2021-04-19 ENCOUNTER — DISEASE STATE MANAGEMENT VISIT (OUTPATIENT)
Dept: PHARMACY | Facility: HOSPITAL | Age: 51
End: 2021-04-19

## 2021-04-19 ENCOUNTER — HOSPITAL ENCOUNTER (OUTPATIENT)
Dept: ULTRASOUND IMAGING | Facility: HOSPITAL | Age: 51
Discharge: HOME | End: 2021-04-19
Admitting: PHYSICIAN ASSISTANT

## 2021-04-19 DIAGNOSIS — B18.2 CHRONIC HEPATITIS C WITHOUT HEPATIC COMA (HCC): ICD-10-CM

## 2021-04-19 PROCEDURE — 76705 ECHO EXAM OF ABDOMEN: CPT

## 2021-04-19 RX ORDER — GLECAPREVIR AND PIBRENTASVIR 40; 100 MG/1; MG/1
3 TABLET, FILM COATED ORAL DAILY
Qty: 84 TABLET | Refills: 1 | Status: SHIPPED | OUTPATIENT
Start: 2021-04-19 | End: 2023-03-10

## 2021-04-19 RX ORDER — IBUPROFEN 800 MG/1
800 TABLET ORAL 3 TIMES DAILY PRN
COMMUNITY

## 2021-04-19 NOTE — PROGRESS NOTES
Timbo Mckeon is a 51 y.o. male [facility] for Hepatitis C treatment.      Past Medical History:   Diagnosis Date   • Chronic back pain    • COPD (chronic obstructive pulmonary disease) (CMS/HCC)    • Hypertension    • Seizures (CMS/HCC)      Social History     Socioeconomic History   • Marital status: Single     Spouse name: Not on file   • Number of children: Not on file   • Years of education: Not on file   • Highest education level: Not on file   Tobacco Use   • Smoking status: Current Every Day Smoker     Packs/day: 1.50     Years: 18.00     Pack years: 27.00     Types: Cigarettes   • Smokeless tobacco: Never Used   Vaping Use   • Vaping Use: Never used   Substance and Sexual Activity   • Alcohol use: No   • Drug use: Not Currently   • Sexual activity: Defer     Patient has no known allergies.    Current Outpatient Medications:   •  atenolol (TENORMIN) 50 MG tablet, Take 50 mg by mouth Daily., Disp: , Rfl:   •  buprenorphine-naloxone (SUBOXONE) 8-2 MG per SL tablet, Place 2 tablets under the tongue Daily., Disp: , Rfl:   •  chlorthalidone (HYGROTEN) 50 MG tablet, Take 50 mg by mouth Daily., Disp: , Rfl:   •  famotidine (PEPCID) 40 MG tablet, Take 40 mg by mouth Daily., Disp: , Rfl:   •  Fluticasone Furoate-Vilanterol (Breo Ellipta) 200-25 MCG/INH inhaler, Inhale 1 puff Daily., Disp: , Rfl:   •  gabapentin (NEURONTIN) 800 MG tablet, 800 mg 2 (Two) Times a Day., Disp: , Rfl:   •  ibuprofen (ADVIL,MOTRIN) 800 MG tablet, Take 800 mg by mouth 3 (Three) Times a Day As Needed for Mild Pain ., Disp: , Rfl:   •  polyethylene glycol (MiraLax) 17 g packet, Take 17 g by mouth Daily., Disp: , Rfl:   •  vitamin D (ERGOCALCIFEROL) 1.25 MG (36743 UT) capsule capsule, Take 50,000 Units by mouth Every 7 (Seven) Days., Disp: , Rfl:   •  DULERA 100-5 MCG/ACT inhaler, , Disp: , Rfl:   •  Glecaprevir-Pibrentasvir (Mavyret) 100-40 MG tablet, Take 3 tablets by mouth Daily. Take all 3 tablets PO once  daily with food, Disp: 84 tablet, Rfl: 1  •  Unable to find, 2 each Daily With Breakfast & Lunch. Hydroxycut, Disp: , Rfl:     Labs     WBC   Date Value Ref Range Status   04/02/2021 8.82 3.40 - 10.80 10*3/mm3 Final   12/20/2019 15.3 (H) 4.0 - 11.0 K/uL Final     RBC   Date Value Ref Range Status   04/02/2021 6.45 (H) 4.14 - 5.80 10*6/mm3 Final   12/20/2019 5.36 4.50 - 6.00 M/uL Final     Hemoglobin   Date Value Ref Range Status   04/02/2021 19.4 (H) 13.0 - 17.7 g/dL Final   12/20/2019 15.8 13.0 - 18.0 g/dL Final     Hematocrit   Date Value Ref Range Status   04/02/2021 53.5 (H) 37.5 - 51.0 % Final   12/20/2019 46.5 40.0 - 54.0 % Final     MCV   Date Value Ref Range Status   04/02/2021 82.9 79.0 - 97.0 fL Final   12/20/2019 86.8 80.0 - 100.0 fL Final     MCH   Date Value Ref Range Status   04/02/2021 30.1 26.6 - 33.0 pg Final   12/20/2019 29.5 27.0 - 32.0 pg Final     MCHC   Date Value Ref Range Status   04/02/2021 36.3 (H) 31.5 - 35.7 g/dL Final   12/20/2019 34.0 31.0 - 35.0 g/dL Final     RDW   Date Value Ref Range Status   04/02/2021 12.8 12.3 - 15.4 % Final   12/20/2019 13.3 11.0 - 16.0 % Final     RDW-SD   Date Value Ref Range Status   04/02/2021 38.9 37.0 - 54.0 fl Final     MPV   Date Value Ref Range Status   04/02/2021 10.7 6.0 - 12.0 fL Final   12/20/2019 9.8 6.0 - 10.0 fL Final     Platelets   Date Value Ref Range Status   04/02/2021 174 140 - 450 10*3/mm3 Final   12/20/2019 334 150 - 400 K/uL Final     Neutrophil Rel %   Date Value Ref Range Status   05/10/2019 59.8 % Final     Neutrophil %   Date Value Ref Range Status   09/06/2019 82.7 (H) 42.7 - 76.0 % Final     Lymphocyte Rel %   Date Value Ref Range Status   05/10/2019 22.6 % Final     Lymphocyte %   Date Value Ref Range Status   09/06/2019 8.1 (L) 19.6 - 45.3 % Final     Monocyte Rel %   Date Value Ref Range Status   05/10/2019 12.0 % Final     Monocyte %   Date Value Ref Range Status   09/06/2019 7.7 5.0 - 12.0 % Final     Eosinophil Rel %   Date  Value Ref Range Status   05/10/2019 4.3 % Final     Eosinophil %   Date Value Ref Range Status   09/06/2019 0.5 0.3 - 6.2 % Final     Basophil Rel %   Date Value Ref Range Status   05/10/2019 0.5 % Final     Basophil %   Date Value Ref Range Status   09/06/2019 0.3 0.0 - 1.5 % Final     Immature Grans %   Date Value Ref Range Status   09/06/2019 0.7 (H) 0.0 - 0.5 % Final   05/10/2019 0.8 0.0 - 5.0 % Final     Neutrophils Absolute   Date Value Ref Range Status   05/10/2019 7.6 (H) 2.0 - 7.5 K/uL Final     Neutrophils, Absolute   Date Value Ref Range Status   09/06/2019 12.54 (H) 1.70 - 7.00 10*3/mm3 Final     Lymphocytes Absolute   Date Value Ref Range Status   05/10/2019 2.9 1.5 - 4.0 K/uL Final     Lymphocytes, Absolute   Date Value Ref Range Status   09/06/2019 1.23 0.70 - 3.10 10*3/mm3 Final     Monocytes Absolute   Date Value Ref Range Status   05/10/2019 1.5 (H) 0.2 - 0.8 K/uL Final     Monocytes, Absolute   Date Value Ref Range Status   09/06/2019 1.17 (H) 0.10 - 0.90 10*3/mm3 Final     Eosinophils Absolute   Date Value Ref Range Status   05/10/2019 0.6 (H) 0.0 - 0.4 K/uL Final     Eosinophils, Absolute   Date Value Ref Range Status   09/06/2019 0.08 0.00 - 0.40 10*3/mm3 Final     Basophils Absolute   Date Value Ref Range Status   05/10/2019 0.1 0.0 - 0.1 K/uL Final     Basophils, Absolute   Date Value Ref Range Status   09/06/2019 0.04 0.00 - 0.20 10*3/mm3 Final     Immature Grans, Absolute   Date Value Ref Range Status   09/06/2019 0.10 (H) 0.00 - 0.05 10*3/mm3 Final   05/10/2019 0.1 K/uL Final     nRBC   Date Value Ref Range Status   09/06/2019 0.0 0.0 - 0.2 /100 WBC Final       Lab Results   Component Value Date    WBC 8.82 04/02/2021    HGB 19.4 (H) 04/02/2021    HCT 53.5 (H) 04/02/2021    MCV 82.9 04/02/2021     04/02/2021     Lab Results   Component Value Date    HAV Negative 04/07/2021    HEPBCAB Positive (A) 04/07/2021       No results found for: HCVRNA   Hepatitis C Genotype   Date Value Ref  Range Status   04/07/2021 1a  Final     HCV Genotype   Date Value Ref Range Status   04/07/2021 Comment  Final     Comment:     To be performed on this specimen.       Drug Interactions Screening     A drug-drug interactions check was made. No interactions expected according to literature.     Assessment & Plan     Patient has Hepatitis C, genotype 1a without cirrhosis (F2) and is treatment naïve.  Patient has been prescribed Mavyret 3 tablets daily with food x 8 weeks.  Will begin prior authorization, if applicable and provide medication counseling to patient once drug is approved and ready to dispense.      Pt will follow up with clinic 4 weeks after starting medication to assess virologic response and medication tolerability.     Sue Nieto RPH  04/19/21  10:50 EDT

## 2021-04-20 ENCOUNTER — TELEPHONE (OUTPATIENT)
Dept: PHARMACY | Facility: HOSPITAL | Age: 51
End: 2021-04-20

## 2021-04-20 NOTE — TELEPHONE ENCOUNTER
Patient was counseled on new medication Mavyret (glecaprevir and pibrentasvir)     The patient was counseled on the following:     - Take 3 tablets at the same time each day, with food.   - This medication is used to treat hepatitis C infection.   - Frequently reported side effects of this drug include: headache, loss of energy, nausea and diarrhea.   - Talk to your doctor right away if you notice dark urine, fatigue, lack of appetite, nausea, abdominal pain, light-colored stools, vomiting or yellow skin.   - Go to the ED with signs of a significant reaction including wheezing; chest tightness; fever; itching; bad cough; blue skin color; seizures; or swelling of face, lips, tongue or     throat.   - Be sure to follow up with our clinic 4 weeks after starting the medication to ensure you are tolerating the medication well and that you are responding appropriately to the medication.   - Make sure to tell your doctor or pharmacist about all medications you are taking, including herbal supplements and OTC products.    - Do not stop taking this medication without talking to your provider.   - It is very important that you do not miss a dose of this medication.  Use a pill planner, medication adherence vi or other tool to help you remember how to take your medication.    - If you do miss a dose and it is within 18 hours from the usual dosage time, administer dose as soon as possible, then administer dose at usual dosage time.  If more than 18 hours from the usual dosage time has passed, skip the missed dose and administer the next dose at the usual time.       Patient Specific Counseling Points:     -Patient is not diabetic.    -Mr. Mckeon reports taking Hydroxycut over the counter and, occasionally, IBU. I have counseled to stop Hydroxycut. We do not have any information on how this could interact with Mavyret, or his other medications.    -Patient does not need Hep A or B vaccines per his chart.    We will schedule a  4-week follow-up appointment in the [facility].    Sobeida Bolaños, GoranD.

## 2021-05-03 ENCOUNTER — TELEPHONE (OUTPATIENT)
Dept: PHARMACY | Facility: HOSPITAL | Age: 51
End: 2021-05-03

## 2021-05-03 NOTE — TELEPHONE ENCOUNTER
Mr. Mckeon has completed 2 weeks of his Mavyret therapy. He reports that it is making him sick. He takes it every night with dinner and has not missed a dose. His stomach doesn't get upset until the morning. Mr. Mckeon reports that he does not eat breakfast. I have advised him to get something on his stomach, like toast or crackers, first thing in the morning to see if this helps his nausea. He will  his refill on 5/5/21 as he had a follow-up appointment with Isabel.     Sobeida Bolaños, GoranD.

## 2021-05-05 ENCOUNTER — OFFICE VISIT (OUTPATIENT)
Dept: GASTROENTEROLOGY | Facility: CLINIC | Age: 51
End: 2021-05-05

## 2021-05-05 VITALS
SYSTOLIC BLOOD PRESSURE: 115 MMHG | DIASTOLIC BLOOD PRESSURE: 83 MMHG | HEIGHT: 69 IN | HEART RATE: 71 BPM | WEIGHT: 260 LBS | TEMPERATURE: 98.9 F | BODY MASS INDEX: 38.51 KG/M2

## 2021-05-05 DIAGNOSIS — K21.9 GASTROESOPHAGEAL REFLUX DISEASE, UNSPECIFIED WHETHER ESOPHAGITIS PRESENT: ICD-10-CM

## 2021-05-05 DIAGNOSIS — B18.2 CHRONIC HEPATITIS C WITHOUT HEPATIC COMA (HCC): ICD-10-CM

## 2021-05-05 DIAGNOSIS — K80.20 CALCULUS OF GALLBLADDER WITHOUT CHOLECYSTITIS WITHOUT OBSTRUCTION: ICD-10-CM

## 2021-05-05 DIAGNOSIS — Z12.12 ENCOUNTER FOR COLORECTAL CANCER SCREENING: Primary | ICD-10-CM

## 2021-05-05 DIAGNOSIS — Z12.11 ENCOUNTER FOR COLORECTAL CANCER SCREENING: Primary | ICD-10-CM

## 2021-05-05 PROCEDURE — 99214 OFFICE O/P EST MOD 30 MIN: CPT | Performed by: PHYSICIAN ASSISTANT

## 2021-05-05 RX ORDER — ESOMEPRAZOLE MAGNESIUM 40 MG/1
40 CAPSULE, DELAYED RELEASE ORAL
Qty: 30 CAPSULE | Refills: 2 | Status: SHIPPED | OUTPATIENT
Start: 2021-05-05 | End: 2021-06-30

## 2021-05-05 RX ORDER — BISACODYL 5 MG
TABLET, DELAYED RELEASE (ENTERIC COATED) ORAL
Qty: 4 TABLET | Refills: 0 | Status: SHIPPED | OUTPATIENT
Start: 2021-05-05

## 2021-05-05 RX ORDER — POLYETHYLENE GLYCOL 3350 17 G/17G
POWDER, FOR SOLUTION ORAL
Qty: 238 G | Refills: 0 | Status: SHIPPED | OUTPATIENT
Start: 2021-05-05

## 2021-05-05 RX ORDER — SODIUM CHLORIDE 9 MG/ML
30 INJECTION, SOLUTION INTRAVENOUS CONTINUOUS PRN
Status: CANCELLED | OUTPATIENT
Start: 2021-05-05

## 2021-05-05 NOTE — PROGRESS NOTES
Follow Up Note     Date: 2021   Patient Name: Timbo Mckeon  MRN: [id number]  : 1970     Primary Care Provider: Yadira Morrissey NP-C     Chief Complaint:    Chief Complaint   Patient presents with   • Follow-up   • Colon Cancer Screening   • Hepatitis C   • Heartburn     History of present illness:   2021  Timbo Mckeon is a 51 y.o. male who is here today for follow up regarding need for colon cancer screening, heartburn and hepatitis C.    He has never had a colonoscopy.  He would like to arrange colonoscopy for screening.  There is no known first-degree family history of colon polyps or colon cancer.  He states that he has frequent nausea and intermittent vomiting.  He feels that this is related to his acid reflux because it mostly occurs in the mornings.  He has tried taking Prilosec and Protonix in the past without relief.  He is currently only taking famotidine which is not helping with heartburn or vomiting.    For treatment of chronic hepatitis C infection, he has been taking Mavyret 3 tabs p.o. once daily for the past 2 weeks.  He has noted some fatigue since starting this medication.  He is not missed any doses of this medication.  He continues to abstain from illicit drug use.  He does not currently drink alcohol.  He had ultrasound of the liver completed since last visit would like to discuss those results.    Interval History:  2021  He completed labs last visit as directed.  He would like to be considered for hepatitis C therapy.  He continues to abstain from alcohol and illicit drug use.  He is feeling at his baseline today without any new complaints. He is scheduled to have US liver soon.      2021  He found out about having Hepatitis C infection approx 2 years ago. He has not had prior treatment for hepatitis. He has been clean now since . He has history of incarceration in . Reports no known personal history of liver disease including other viral  hepatitis. He was told recently that he could also have Hepatitis B. There is no known family history of liver disease or cirrhosis. He admits to previous IVDU and intranasal drug use. He does have nonprofessional tattoos. Admits to having previous alcoholism, was drinking up to 90 cans of beer per day for 15-20 years. He stopped drinking after he was started on the suboxone. He does not currently drink alcohol. He denies current illicit drug use including marijuana. No recent liver imaging. He has had recent labs. He has not had previous vaccinations for Hepatitis A and B.      He has not had previous colonoscopy. There is no known family history of colon cancer or colon polyps. He takes miralax once daily due to constipation. No rectal bleeding. He has difficulty urinating, has urgency but only urinates a small amount. He does drink 12-18 [id number] per day. He has never had a prostate exam.     Subjective     Past Medical History:   Diagnosis Date   • Chronic back pain    • COPD (chronic obstructive pulmonary disease) (CMS/HCC)    • Hypertension    • Seizures (CMS/HCC)     isolated episode in 2019   • Snores    • Spider bite    • Tattoos      Past Surgical History:   Procedure Laterality Date   • ANKLE SURGERY Right    • BACK SURGERY  2006   • FOOT SURGERY Right    • HAND SURGERY      due to spider bite   • SPINE SURGERY       Family History   Problem Relation Age of Onset   • Diabetes Father    • Hypertension Father    • Cancer Mother    • Colon cancer Neg Hx    • Cirrhosis Neg Hx    • Liver cancer Neg Hx    • Liver disease Neg Hx      Social History     Socioeconomic History   • Marital status: Single     Spouse name: Not on file   • Number of children: Not on file   • Years of education: Not on file   • Highest education level: Not on file   Tobacco Use   • Smoking status: Current Every Day Smoker     Packs/day: 1.50     Years: 18.00     Pack years: 27.00     Types: Cigarettes     Start date: 2003   • Smokeless  tobacco: Never Used   Vaping Use   • Vaping Use: Never used   Substance and Sexual Activity   • Alcohol use: No   • Drug use: Not Currently   • Sexual activity: Defer       Current Outpatient Medications:   •  Albuterol Sulfate, sensor, 108 (90 Base) MCG/ACT aerosol powder , Inhale As Needed., Disp: , Rfl:   •  atenolol (TENORMIN) 50 MG tablet, Take 50 mg by mouth Daily., Disp: , Rfl:   •  buprenorphine-naloxone (SUBOXONE) 8-2 MG per SL tablet, Place 2 tablets under the tongue Daily., Disp: , Rfl:   •  chlorthalidone (HYGROTEN) 50 MG tablet, Take 50 mg by mouth Daily., Disp: , Rfl:   •  DULERA 100-5 MCG/ACT inhaler, , Disp: , Rfl:   •  famotidine (PEPCID) 40 MG tablet, Take 40 mg by mouth Daily., Disp: , Rfl:   •  Fluticasone Furoate-Vilanterol (Breo Ellipta) 200-25 MCG/INH inhaler, Inhale 1 puff Daily., Disp: , Rfl:   •  gabapentin (NEURONTIN) 800 MG tablet, 800 mg 2 (Two) Times a Day., Disp: , Rfl:   •  Glecaprevir-Pibrentasvir (Mavyret) 100-40 MG tablet, Take 3 tablets by mouth Daily. Take all 3 tablets at one time with food, Disp: 84 tablet, Rfl: 1  •  ibuprofen (ADVIL,MOTRIN) 800 MG tablet, Take 800 mg by mouth 3 (Three) Times a Day As Needed for Mild Pain ., Disp: , Rfl:   •  polyethylene glycol (MiraLax) 17 g packet, Take 17 g by mouth Daily., Disp: , Rfl:   •  vitamin D (ERGOCALCIFEROL) 1.25 MG (41279 UT) capsule capsule, Take 50,000 Units by mouth Every 7 (Seven) Days., Disp: , Rfl:     No Known Allergies    The following portions of the patient's history were reviewed and updated as appropriate: allergies, current medications, past family history, past medical history, past social history, past surgical history and problem list.    Objective     Physical Exam  Vitals reviewed.   Constitutional:       General: He is not in acute distress.     Appearance: Normal appearance. He is well-developed. He is not ill-appearing or diaphoretic.   HENT:      Head: Normocephalic and atraumatic.      Right Ear: External  "ear normal.      Left Ear: External ear normal.      Nose: Nose normal.      Mouth/Throat:      Comments: Wearing a mask  Eyes:      General: No scleral icterus.        Right eye: No discharge.         Left eye: No discharge.      Conjunctiva/sclera: Conjunctivae normal.   Neck:      Vascular: No JVD.   Cardiovascular:      Rate and Rhythm: Normal rate and regular rhythm.      Heart sounds: Normal heart sounds. No murmur heard.   No friction rub. No gallop.    Pulmonary:      Effort: Pulmonary effort is normal. No respiratory distress.      Breath sounds: Normal breath sounds. No wheezing or rales.   Chest:      Chest wall: No tenderness.   Abdominal:      General: Bowel sounds are normal. There is no distension.      Palpations: Abdomen is soft. There is no mass.      Tenderness: There is no abdominal tenderness. There is no guarding.   Musculoskeletal:         General: No deformity. Normal range of motion.      Cervical back: Normal range of motion.   Skin:     General: Skin is warm and dry.      Findings: No erythema or rash.      Comments: tattoos   Neurological:      Mental Status: He is alert and oriented to person, place, and time.      Coordination: Coordination normal.   Psychiatric:         Mood and Affect: Mood normal.         Behavior: Behavior normal.         Thought Content: Thought content normal.         Judgment: Judgment normal.       Vitals:    05/05/21 1342   BP: 115/83   Pulse: 71   Temp: 98.9 °F (37.2 °C)   Weight: 118 kg (260 lb)   Height: 175.3 cm (69\")       Results Review:   I reviewed the patient's new clinical results.    Lab on 04/02/2021   Component Date Value Ref Range Status   • Color, UA 04/02/2021 Yellow  Yellow, Straw Final   • Appearance, UA 04/02/2021 Clear  Clear Final   • pH, UA 04/02/2021 7.0  5.0 - 8.0 Final   • Specific Gravity, UA 04/02/2021 1.012  1.005 - 1.030 Final   • Glucose, UA 04/02/2021 Negative  Negative Final   • Ketones, UA 04/02/2021 Negative  Negative Final   • " Bilirubin, UA 04/02/2021 Negative  Negative Final   • Blood, UA 04/02/2021 Negative  Negative Final   • Protein, UA 04/02/2021 Negative  Negative Final   • Leuk Esterase, UA 04/02/2021 Negative  Negative Final   • Nitrite, UA 04/02/2021 Negative  Negative Final   • Urobilinogen, UA 04/02/2021 1.0 E.U./dL  0.2 - 1.0 E.U./dL Final   • WBC 04/02/2021 8.82  3.40 - 10.80 10*3/mm3 Final   • RBC 04/02/2021 6.45* 4.14 - 5.80 10*6/mm3 Final   • Hemoglobin 04/02/2021 19.4* 13.0 - 17.7 g/dL Final   • Hematocrit 04/02/2021 53.5* 37.5 - 51.0 % Final   • MCV 04/02/2021 82.9  79.0 - 97.0 fL Final   • MCH 04/02/2021 30.1  26.6 - 33.0 pg Final   • MCHC 04/02/2021 36.3* 31.5 - 35.7 g/dL Final   • RDW 04/02/2021 12.8  12.3 - 15.4 % Final   • RDW-SD 04/02/2021 38.9  37.0 - 54.0 fl Final   • MPV 04/02/2021 10.7  6.0 - 12.0 fL Final   • Platelets 04/02/2021 174  140 - 450 10*3/mm3 Final   • Glucose 04/02/2021 88  65 - 99 mg/dL Final   • BUN 04/02/2021 10  6 - 20 mg/dL Final   • Creatinine 04/02/2021 0.76  0.76 - 1.27 mg/dL Final   • Sodium 04/02/2021 135* 136 - 145 mmol/L Final   • Potassium 04/02/2021 3.1* 3.5 - 5.2 mmol/L Final    Slight hemolysis detected by analyzer. Results may be affected.   • Chloride 04/02/2021 95* 98 - 107 mmol/L Final   • CO2 04/02/2021 25.3  22.0 - 29.0 mmol/L Final   • Calcium 04/02/2021 9.7  8.6 - 10.5 mg/dL Final   • Total Protein 04/02/2021 7.5  6.0 - 8.5 g/dL Final   • Albumin 04/02/2021 4.40  3.50 - 5.20 g/dL Final   • ALT (SGPT) 04/02/2021 40  1 - 41 U/L Final   • AST (SGOT) 04/02/2021 45* 1 - 40 U/L Final    Slight hemolysis detected by analyzer. Results may be affected.   • Alkaline Phosphatase 04/02/2021 119* 39 - 117 U/L Final   • Total Bilirubin 04/02/2021 0.5  0.0 - 1.2 mg/dL Final   • eGFR Non African Amer 04/02/2021 109  >60 mL/min/1.73 Final   • Globulin 04/02/2021 3.1  gm/dL Final   • A/G Ratio 04/02/2021 1.4  g/dL Final   • BUN/Creatinine Ratio 04/02/2021 13.2  7.0 - 25.0 Final   • Anion Gap  04/02/2021 14.7  5.0 - 15.0 mmol/L Final   • Fibrosis Score 04/07/2021 0.53* 0.00 - 0.21 Final   • Fibrosis Stage 04/07/2021 Comment   Final                F2 - Bridging fibrosis with few septa   • Necroinflammat Activity Score 04/07/2021 0.25* 0.00 - 0.17 Final   • Necroinflammat Activity Grade 04/07/2021 A0-A1   Final   • Alpha 2-Macroglobulins, Qn 04/07/2021 272  110 - 276 mg/dL Final   • Haptoglobin 04/07/2021 142  23 - 355 mg/dL Final   • Apolipoprotein A-1 04/07/2021 115  101 - 178 mg/dL Final   • Total Bilirubin 04/07/2021 0.5  0.0 - 1.2 mg/dL Final   • GGT 04/07/2021 87* 0 - 65 IU/L Final   • ALT (SGPT) 04/07/2021 36  0 - 55 IU/L Final   • HCV Qual Interp 04/07/2021 Comment   Final   • Comment 04/07/2021 Comment   Final   • Hepatitis C Quantitation 04/07/2021 [id number]  IU/mL Final   • HCV log10 04/07/2021 6.405  log10 IU/mL Final   • HCV Test Information 04/07/2021 Comment   Final    The quantitative range of this assay is 15 IU/mL to 100 million IU/mL.   • HCV Genotype 04/07/2021 Comment   Final    To be performed on this specimen.   • Hep A Total Ab 04/07/2021 Negative  Negative Final   • Hep B Core Total Ab 04/07/2021 Positive* Negative Final   • Hep B S Ab 04/07/2021 Reactive* Non-Reactive Final   • HIV-1/ HIV-2 Ab 04/07/2021 Non-Reactive  Non-Reactive Final   • HIV-1 P24 Ag Screen 04/07/2021 Non-Reactive  Non-Reactive Final   • Protime 04/07/2021 12.0  12.0 - 15.1 Seconds Final   • INR 04/07/2021 0.84* 0.90 - 1.10 Final   • THC, Screen, Urine 04/02/2021 Negative  Negative Final   • Phencyclidine (PCP), Urine 04/02/2021 Negative  Negative Final   • Cocaine Screen, Urine 04/02/2021 Negative  Negative Final   • Methamphetamine, Ur 04/02/2021 Negative  Negative Final   • Opiate Screen 04/02/2021 Negative  Negative Final   • Amphetamine Screen, Urine 04/02/2021 Negative  Negative Final   • Benzodiazepine Screen, Urine 04/02/2021 Negative  Negative Final   • Tricyclic Antidepressants Screen 04/02/2021 Negative   Negative Final   • Methadone Screen, Urine 04/02/2021 Negative  Negative Final   • Barbiturates Screen, Urine 04/02/2021 Negative  Negative Final   • Oxycodone Screen, Urine 04/02/2021 Negative  Negative Final   • Propoxyphene Screen 04/02/2021 Negative  Negative Final   • Buprenorphine, Screen, Urine 04/02/2021 Positive* Negative Final   • Magnesium 04/02/2021 1.8  1.6 - 2.6 mg/dL Final   • Hepatitis C Genotype 04/07/2021 1a   Final   Disease State Management Visit on 04/02/2021   Component Date Value Ref Range Status   • Hepatitis B Surface Ag 04/07/2021 Non-Reactive  Non-Reactive Final      US Liver  Result Date: 4/19/2021  1. Unremarkable evaluation of the liver. 2. Gallstones.       Assessment / Plan      1. Encounter for colorectal cancer screening  He has never had a colonoscopy.  There is no known first-degree family history of colon cancer or colon polyps.  We will arrange colonoscopy for screening.    He will have a colonoscopy performed with monitored anesthesia care. The indications, technique, alternatives and potential risk and complications were discussed with the patient including but not limited to bleeding, bowel perforations, missing lesions and anesthetic complications. The patient understands and wishes to proceed with the procedure and has given their verbal consent. Written patient education information was given to the patient. He should follow up in the office after this procedure to discuss the results and further recommendations can be made at that time. The patient will call if they have further questions before procedure.  - sodium chloride 0.9 % infusion  - Case Request  - polyethylene glycol (MIRALAX) 17 GM/SCOOP powder; Follow directions given at office  Dispense: 238 g; Refill: 0  - bisacodyl (Dulcolax) 5 MG EC tablet; Follow instructions given at office  Dispense: 4 tablet; Refill: 0    4/2/2021  He has never had a colonoscopy, age 51.  He needs colonoscopy for colorectal cancer  screening.  We will arrange GI visit.     2. Calculus of gallbladder without cholecystitis without obstruction  5/5/2021  Most recent ultrasound of the liver shows gallstones.  He is currently having nausea with intermittent vomiting but seems likely related to his acid reflux.  No diarrhea.  He will have conservative therapy of his gallstones at this time.    3. Chronic hepatitis C without hepatic coma, CTP class A. fibrosis stage F2, genotype 1a.  5/5/2021  He has been taking Mavyret 3 tabs p.o. once daily for the past 2 weeks.  He will have labs completed after 4 weeks of therapy and those have been ordered today including HCVRNA quant and CMP for monitoring of response to treatment.  He will continue to abstain from illicit drug use.  He will take this medication exactly as directed for the duration of 8 weeks.  Labs to be completed again at completion of therapy and again at 3 months status post completion of therapy for confirmation of cure.  Recent liver ultrasound showed normal liver.  - Hepatitis C RNA, Quantitative, PCR (graph); Future  - Comprehensive Metabolic Panel; Future    4/16/2021  He has chronic Hepatitis C infection, treatment naive, without cirrhosis. I have perscribed Mavyret for 8 weeks for Hepatitis C therapy. He will take this medication at the same time every day without missing any doses. We had a discussion on treatment course, possible medication adverse reactions and follow-up during the treatment and after treatment. Hep C viral load lab (HCV RNA quant) and CMP will be checked 4 weeks after start of therapy, at end of therapy and 3 months s/p therapy to determine response to treatment and cure. He will continue to abstain from alcohol use at this time and agrees not to use illegal drugs. He understands to avoid any high risk behavior to prevent any reinfection during treatment and after treatment.  Rx- Glecaprevir-Pibrentasvir (Mavyret) 100-40 MG tablet, Take 3 tablets by mouth Daily  for 8 weeks. Disp: 84 tablet, Rfl: 1     He is immune to hepatitis A and B and no vaccinations are needed.   Source of hepatitis C infection is likely his past IVDU. He is no longer using IV or intranasal drugs.   Liver imaging planned and scheduled.   HIV test is negative. No history of liver transplant.   Follow-up in 4 weeks time for discussion regarding medication adherence and will have labs after next visit.     4/2/2021  More recommendations will be made after these results have been reviewed. He will continue to abstain from alcohol and illegal drugs. He will have US liver to determine if any lesions or other liver diseases present. Immunity to Hep A and B will be determined and vaccinations recommended if needed. After review of these results and discussion with with the patient, we will proceed with Hep C therapy and will submit Rx to insurance company for approval. Treatment will depend on fibrosis score and Hep C genotype. When approved, he will  Rx from our pharmacy and start taking exactly as directed. Hep C viral load lab (HCV RNA quant) and CMP will be checked 4 weeks after start of therapy, at end of therapy and 3 months s/p therapy to determine response to treatment and cure. He will call with concerns.      4. Gastroesophageal reflux disease, unspecified whether esophagitis present  5/5/2021  He has significant acid reflux, currently taking famotidine without relief.  He has tried taking Protonix and Prilosec in the past without relief.  I have recommended that he start taking Nexium 40 mg once daily 30 minutes before meal for treatment of GERD.  We will arrange EGD if symptoms persist.  No current dysphagia.  He was instructed not to lie down immediately after eating (wait at least 3 hours after meals), elevate the head of the bed at night, avoid spicy foods, avoid mints, avoid caffeine, avoid nicotine and work on getting to a healthy weight.  - esomeprazole (nexIUM) 40 MG capsule; Take 1  capsule by mouth Every Morning Before Breakfast.  Dispense: 30 capsule; Refill: 2    4/2/2021  He currently takes Pepcid 40 mg once daily without complete control of acid reflux symptoms.  We will discuss further at GI visit.           Prior history:  Hepatitis B core antibody positive  4/16/2021  He has negative Hepatitis B surface antigen which indicates no present Hepatitis B infection. He has hepatitis B core total ab positive which indicates past infection. He will monitor for any symptoms which could correlate with reactivation of Hepatitis B while taking Hepatitis C therapy such as jaundice, abdominal pain, nausea, vomiting, diarrhea, fatigue, etc. He will report to ED if these occur.   4/2/2021  He will have hepatitis B labs today including hepatitis B core total antibody, hepatitis B surface antibody hepatitis B surface antigen which will help us determine if he has any active hepatitis B infection.  Based on his history, if only has positive hepatitis B core total antibody positive, he has had a previous exposure to hepatitis B and no current infection.     4/2/2021  History of alcoholism   4/2/2021  He does report a past history of alcoholism.  Not currently drinking.  He was directed to continue abstinence from alcohol.     History of drug abuse in remission   4/2/2021  He has a long history of IV drug use.  He is not currently using any illicit drugs.  He takes Suboxone as recommended by his physician.  He understands that treatment of hepatitis C does not make him immune to any future infections if he continues to have risky behaviors.             Follow Up:   Return for follow up after procedure to discuss results.      Isabel Gonzalez PA-C  [facility]  5/6/2021  09:17 EDT    Please note that portions of this note may have been completed with a voice recognition program. Efforts were made to edit the dictations, but occasionally words are mistranscribed.

## 2021-05-05 NOTE — PATIENT INSTRUCTIONS
Please have labs approx 5/19/21 to re-check Hep C. You will go to outpatient lab and do not need an appt.

## 2021-05-13 PROBLEM — Z12.12 ENCOUNTER FOR COLORECTAL CANCER SCREENING: Status: ACTIVE | Noted: 2021-05-13

## 2021-05-13 PROBLEM — Z12.11 ENCOUNTER FOR COLORECTAL CANCER SCREENING: Status: ACTIVE | Noted: 2021-05-13

## 2021-05-17 DIAGNOSIS — B18.2 CHRONIC HEPATITIS C WITHOUT HEPATIC COMA (HCC): Primary | ICD-10-CM

## 2021-05-20 ENCOUNTER — TELEPHONE (OUTPATIENT)
Dept: GASTROENTEROLOGY | Facility: CLINIC | Age: 51
End: 2021-05-20

## 2021-05-20 ENCOUNTER — TELEPHONE (OUTPATIENT)
Dept: PHARMACY | Facility: HOSPITAL | Age: 51
End: 2021-05-20

## 2021-05-20 ENCOUNTER — LAB (OUTPATIENT)
Dept: LAB | Facility: HOSPITAL | Age: 51
End: 2021-05-20

## 2021-05-20 DIAGNOSIS — E87.6 HYPOKALEMIA: Primary | ICD-10-CM

## 2021-05-20 DIAGNOSIS — E87.6 HYPOKALEMIA: ICD-10-CM

## 2021-05-20 DIAGNOSIS — B18.2 CHRONIC HEPATITIS C WITHOUT HEPATIC COMA (HCC): ICD-10-CM

## 2021-05-20 LAB
ALBUMIN SERPL-MCNC: 4.2 G/DL (ref 3.5–5.2)
ALBUMIN/GLOB SERPL: 1.2 G/DL
ALP SERPL-CCNC: 127 U/L (ref 39–117)
ALT SERPL W P-5'-P-CCNC: 21 U/L (ref 1–41)
ANION GAP SERPL CALCULATED.3IONS-SCNC: 12.3 MMOL/L (ref 5–15)
AST SERPL-CCNC: 28 U/L (ref 1–40)
BILIRUB SERPL-MCNC: 0.4 MG/DL (ref 0–1.2)
BUN SERPL-MCNC: 8 MG/DL (ref 6–20)
BUN/CREAT SERPL: 11.1 (ref 7–25)
CALCIUM SPEC-SCNC: 9.8 MG/DL (ref 8.6–10.5)
CHLORIDE SERPL-SCNC: 94 MMOL/L (ref 98–107)
CO2 SERPL-SCNC: 29.7 MMOL/L (ref 22–29)
CREAT SERPL-MCNC: 0.72 MG/DL (ref 0.76–1.27)
GFR SERPL CREATININE-BSD FRML MDRD: 116 ML/MIN/1.73
GLOBULIN UR ELPH-MCNC: 3.6 GM/DL
GLUCOSE SERPL-MCNC: 94 MG/DL (ref 65–99)
MAGNESIUM SERPL-MCNC: 1.9 MG/DL (ref 1.6–2.6)
POTASSIUM SERPL-SCNC: 3.3 MMOL/L (ref 3.5–5.2)
PROT SERPL-MCNC: 7.8 G/DL (ref 6–8.5)
SODIUM SERPL-SCNC: 136 MMOL/L (ref 136–145)

## 2021-05-20 PROCEDURE — 83735 ASSAY OF MAGNESIUM: CPT

## 2021-05-20 PROCEDURE — 87522 HEPATITIS C REVRS TRNSCRPJ: CPT

## 2021-05-20 PROCEDURE — 80053 COMPREHEN METABOLIC PANEL: CPT

## 2021-05-20 PROCEDURE — 36415 COLL VENOUS BLD VENIPUNCTURE: CPT

## 2021-05-20 RX ORDER — POTASSIUM CHLORIDE 750 MG/1
10 TABLET, FILM COATED, EXTENDED RELEASE ORAL 2 TIMES DAILY
Qty: 14 TABLET | Refills: 0 | Status: SHIPPED | OUTPATIENT
Start: 2021-05-20 | End: 2023-02-22 | Stop reason: SDUPTHER

## 2021-05-20 NOTE — TELEPHONE ENCOUNTER
CALLED PATIENT TO INFORM HIM OF THE LAB ORDERS THAT HAD BEEN PLACED FOR HIS 4 WEEK HEP C FOLLOW. PT STATED HE WAS AT THE LAB TO HAVE THEM DRAWN NOW.

## 2021-05-22 LAB
HCV RNA SERPL NAA+PROBE-ACNC: NORMAL IU/ML
TEST INFORMATION: NORMAL

## 2021-06-11 ENCOUNTER — DISEASE STATE MANAGEMENT VISIT (OUTPATIENT)
Dept: PHARMACY | Facility: HOSPITAL | Age: 51
End: 2021-06-11

## 2021-06-11 VITALS
TEMPERATURE: 96.9 F | DIASTOLIC BLOOD PRESSURE: 85 MMHG | SYSTOLIC BLOOD PRESSURE: 138 MMHG | HEART RATE: 77 BPM | OXYGEN SATURATION: 97 %

## 2021-06-11 DIAGNOSIS — B18.2 CHRONIC HEPATITIS C WITHOUT HEPATIC COMA (HCC): Primary | ICD-10-CM

## 2021-06-11 DIAGNOSIS — K80.20 CALCULUS OF GALLBLADDER WITHOUT CHOLECYSTITIS WITHOUT OBSTRUCTION: ICD-10-CM

## 2021-06-11 PROCEDURE — 99214 OFFICE O/P EST MOD 30 MIN: CPT | Performed by: PHYSICIAN ASSISTANT

## 2021-06-11 RX ORDER — ERGOCALCIFEROL (VITAMIN D2) 50 MCG
CAPSULE ORAL
COMMUNITY

## 2021-06-11 NOTE — PROGRESS NOTES
Follow Up Note     Date: 2021   Patient Name: Timbo Mckeon  MRN: [id number]  : 1970     Primary Care Provider: Yadira Morrissey NP-C     Chief Complaint:    Chief Complaint   Patient presents with   • Hepatitis C     Pt here for follow up meds     History of present illness:   6/15/2021  Timbo Mckeon is a 51 y.o. male who is here today for follow up regarding Hepatitis C.     He has been taking Mavyret 3 tabs PO once daily exactly as directed for treatment of Hepatitis C infection. He has not noticed any side effects with the medication. He is feeling well without complaints. He did not miss any days of treatment so far. He completed labs at 4 weeks into therapy and would like to discuss those results. His last dose of Mavyret will be in 3-4 days.     Interval History:  2021  He completed labs last visit as directed.  He would like to be considered for hepatitis C therapy.  He continues to abstain from alcohol and illicit drug use.  He is feeling at his baseline today without any new complaints. He is scheduled to have US liver soon.      2021  He found out about having Hepatitis C infection approx 2 years ago. He has not had prior treatment for hepatitis. He has been clean now since . He has history of incarceration in . Reports no known personal history of liver disease including other viral hepatitis. He was told recently that he could also have Hepatitis B. There is no known family history of liver disease or cirrhosis. He admits to previous IVDU and intranasal drug use. He does have nonprofessional tattoos. Admits to having previous alcoholism, was drinking up to 90 cans of beer per day for 15-20 years. He stopped drinking after he was started on the suboxone. He does not currently drink alcohol. He denies current illicit drug use including marijuana. No recent liver imaging. He has had recent labs. He has not had previous vaccinations for Hepatitis A and B.       He has not had previous colonoscopy. There is no known family history of colon cancer or colon polyps. He takes miralax once daily due to constipation. No rectal bleeding. He has difficulty urinating, has urgency but only urinates a small amount. He does drink 12-18 [id number] per day. He has never had a prostate exam.     Subjective     Past Medical History:   Diagnosis Date   • Chronic back pain    • COPD (chronic obstructive pulmonary disease) (CMS/HCC)    • Hypertension    • Seizures (CMS/HCC)     isolated episode in 2019   • Snores    • Spider bite    • Tattoos      Past Surgical History:   Procedure Laterality Date   • ANKLE SURGERY Right    • BACK SURGERY  2006   • FOOT SURGERY Right    • HAND SURGERY      due to spider bite   • SPINE SURGERY       Family History   Problem Relation Age of Onset   • Diabetes Father    • Hypertension Father    • Cancer Mother    • Colon cancer Neg Hx    • Cirrhosis Neg Hx    • Liver cancer Neg Hx    • Liver disease Neg Hx      Social History     Socioeconomic History   • Marital status: Single     Spouse name: Not on file   • Number of children: Not on file   • Years of education: Not on file   • Highest education level: Not on file   Tobacco Use   • Smoking status: Current Every Day Smoker     Packs/day: 1.50     Years: 18.00     Pack years: 27.00     Types: Cigarettes     Start date: 2003   • Smokeless tobacco: Never Used   Vaping Use   • Vaping Use: Never used   Substance and Sexual Activity   • Alcohol use: No   • Drug use: Not Currently   • Sexual activity: Defer       Current Outpatient Medications:   •  Albuterol Sulfate, sensor, 108 (90 Base) MCG/ACT aerosol powder , Inhale As Needed., Disp: , Rfl:   •  atenolol (TENORMIN) 50 MG tablet, Take 50 mg by mouth Daily., Disp: , Rfl:   •  bisacodyl (Dulcolax) 5 MG EC tablet, Follow instructions given at office, Disp: 4 tablet, Rfl: 0  •  buprenorphine-naloxone (SUBOXONE) 8-2 MG per SL tablet, Place 2 tablets under the  tongue Daily., Disp: , Rfl:   •  chlorthalidone (HYGROTEN) 50 MG tablet, Take 50 mg by mouth Daily., Disp: , Rfl:   •  DULERA 100-5 MCG/ACT inhaler, , Disp: , Rfl:   •  esomeprazole (nexIUM) 40 MG capsule, Take 1 capsule by mouth Every Morning Before Breakfast., Disp: 30 capsule, Rfl: 2  •  Fluticasone Furoate-Vilanterol (Breo Ellipta) 200-25 MCG/INH inhaler, Inhale 1 puff Daily., Disp: , Rfl:   •  gabapentin (NEURONTIN) 800 MG tablet, 800 mg 2 (Two) Times a Day., Disp: , Rfl:   •  Glecaprevir-Pibrentasvir (Mavyret) 100-40 MG tablet, Take 3 tablets by mouth Daily. Take all 3 tablets at one time with food, Disp: 84 tablet, Rfl: 1  •  ibuprofen (ADVIL,MOTRIN) 800 MG tablet, Take 800 mg by mouth 3 (Three) Times a Day As Needed for Mild Pain ., Disp: , Rfl:   •  polyethylene glycol (MiraLax) 17 g packet, Take 17 g by mouth Daily., Disp: , Rfl:   •  polyethylene glycol (MIRALAX) 17 GM/SCOOP powder, Follow directions given at office, Disp: 238 g, Rfl: 0  •  potassium chloride 10 MEQ CR tablet, Take 1 tablet by mouth 2 (Two) Times a Day., Disp: 14 tablet, Rfl: 0  •  vitamin D (ERGOCALCIFEROL) 1.25 MG (19087 UT) capsule capsule, Take 50,000 Units by mouth Every 7 (Seven) Days., Disp: , Rfl:     No Known Allergies    The following portions of the patient's history were reviewed and updated as appropriate: allergies, current medications, past family history, past medical history, past social history, past surgical history and problem list.    Objective     Physical Exam  Vitals reviewed.   Constitutional:       General: He is not in acute distress.     Appearance: Normal appearance. He is well-developed. He is not ill-appearing or diaphoretic.   HENT:      Head: Normocephalic and atraumatic.      Right Ear: External ear normal.      Left Ear: External ear normal.      Nose: Nose normal.      Mouth/Throat:      Comments: Wearing a mask  Eyes:      General: No scleral icterus.        Right eye: No discharge.         Left eye:  No discharge.      Conjunctiva/sclera: Conjunctivae normal.      Comments: glasses   Neck:      Vascular: No JVD.   Cardiovascular:      Rate and Rhythm: Normal rate and regular rhythm.      Heart sounds: Normal heart sounds. No murmur heard.   No friction rub. No gallop.    Pulmonary:      Effort: Pulmonary effort is normal. No respiratory distress.      Breath sounds: Normal breath sounds. No wheezing or rales.   Chest:      Chest wall: No tenderness.   Abdominal:      General: Bowel sounds are normal. There is no distension.      Palpations: Abdomen is soft. There is no mass.      Tenderness: There is no abdominal tenderness. There is no guarding.      Comments: Obese abdomen   Musculoskeletal:         General: No deformity. Normal range of motion.      Cervical back: Normal range of motion.   Skin:     General: Skin is warm and dry.      Findings: Erythema (diffuse) present. No rash.   Neurological:      Mental Status: He is alert and oriented to person, place, and time.      Coordination: Coordination normal.   Psychiatric:         Mood and Affect: Mood normal.         Behavior: Behavior normal.         Thought Content: Thought content normal.         Judgment: Judgment normal.       Vitals:    06/11/21 1100   BP: 138/85   BP Location: Right arm   Patient Position: Sitting   Pulse: 77   Temp: 96.9 °F (36.1 °C)   SpO2: 97%       Results Review:   I reviewed the patient's new clinical results.    Lab on 05/20/2021   Component Date Value Ref Range Status   • Hepatitis C Quantitation 05/20/2021 HCV Not Detected  IU/mL Final   • Test Information 05/20/2021 Comment   Final    The quantitative range of this assay is 15 IU/mL to 100 million IU/mL.   • Glucose 05/20/2021 94  65 - 99 mg/dL Final   • BUN 05/20/2021 8  6 - 20 mg/dL Final   • Creatinine 05/20/2021 0.72* 0.76 - 1.27 mg/dL Final   • Sodium 05/20/2021 136  136 - 145 mmol/L Final   • Potassium 05/20/2021 3.3* 3.5 - 5.2 mmol/L Final    Slight hemolysis detected  by analyzer. Results may be affected.   • Chloride 05/20/2021 94* 98 - 107 mmol/L Final   • CO2 05/20/2021 29.7* 22.0 - 29.0 mmol/L Final   • Calcium 05/20/2021 9.8  8.6 - 10.5 mg/dL Final   • Total Protein 05/20/2021 7.8  6.0 - 8.5 g/dL Final   • Albumin 05/20/2021 4.20  3.50 - 5.20 g/dL Final   • ALT (SGPT) 05/20/2021 21  1 - 41 U/L Final   • AST (SGOT) 05/20/2021 28  1 - 40 U/L Final   • Alkaline Phosphatase 05/20/2021 127* 39 - 117 U/L Final   • Total Bilirubin 05/20/2021 0.4  0.0 - 1.2 mg/dL Final   • eGFR Non  Amer 05/20/2021 116  >60 mL/min/1.73 Final   • Globulin 05/20/2021 3.6  gm/dL Final   • A/G Ratio 05/20/2021 1.2  g/dL Final   • BUN/Creatinine Ratio 05/20/2021 11.1  7.0 - 25.0 Final   • Anion Gap 05/20/2021 12.3  5.0 - 15.0 mmol/L Final   • Magnesium 05/20/2021 1.9  1.6 - 2.6 mg/dL Final     US Liver  Result Date: 4/19/2021  1. Unremarkable evaluation of the liver. 2. Gallstones.        Assessment / Plan      1. Chronic hepatitis C without hepatic coma (CMS/HCC)  6/11/2021  He has been taking Mavyret 3 tabs p.o. once daily for treatment of chronic hepatitis C infection.  He has not missed any doses of this medication.  Has not noticed any side effects while taking it.  He has almost completed therapy has only few days left of treatment.  He will complete labs including CMP and HCVRNA to determine response to treatment.  He will complete labs again in 12 weeks to check for sustained viral response.  Continue to abstain from alcohol and illicit drug use.  - Comprehensive Metabolic Panel; Future  - Hepatitis C RNA, Quantitative, PCR (graph); Future    4/16/2021  CTP class A. fibrosis stage F2, genotype 1a.  He has chronic Hepatitis C infection, treatment naive, without cirrhosis. I have perscribed Mavyret for 8 weeks for Hepatitis C therapy. He will take this medication at the same time every day without missing any doses. We had a discussion on treatment course, possible medication adverse  reactions and follow-up during the treatment and after treatment. Hep C viral load lab (HCV RNA quant) and CMP will be checked 4 weeks after start of therapy, at end of therapy and 3 months s/p therapy to determine response to treatment and cure. He will continue to abstain from alcohol use at this time and agrees not to use illegal drugs. He understands to avoid any high risk behavior to prevent any reinfection during treatment and after treatment.  Rx- Glecaprevir-Pibrentasvir (Mavyret) 100-40 MG tablet, Take 3 tablets by mouth Daily for 8 weeks. Disp: 84 tablet, Rfl: 1  He is immune to hepatitis A and B and no vaccinations are needed.   Source of hepatitis C infection is likely his past IVDU. He is no longer using IV or intranasal drugs.   Liver imaging planned and scheduled.   HIV test is negative. No history of liver transplant.   Follow-up in 4 weeks time for discussion regarding medication adherence and will have labs after next visit.     4/2/2021  More recommendations will be made after these results have been reviewed. He will continue to abstain from alcohol and illegal drugs. He will have US liver to determine if any lesions or other liver diseases present. Immunity to Hep A and B will be determined and vaccinations recommended if needed. After review of these results and discussion with with the patient, we will proceed with Hep C therapy and will submit Rx to insurance company for approval. Treatment will depend on fibrosis score and Hep C genotype. When approved, he will  Rx from our pharmacy and start taking exactly as directed. Hep C viral load lab (HCV RNA quant) and CMP will be checked 4 weeks after start of therapy, at end of therapy and 3 months s/p therapy to determine response to treatment and cure. He will call with concerns.      2. Cholelithiasis, asymptomatic  6/11/2021  He has gallstones mention on most recent imaging of the liver.  He is not having any upper abdominal pain,  nausea, vomiting, diarrhea.  Conservative treatment of gallstones recommended at this time.            Prior history:  Hepatitis B core antibody positive  4/16/2021  He has negative Hepatitis B surface antigen which indicates no present Hepatitis B infection. He has hepatitis B core total ab positive which indicates past infection. He will monitor for any symptoms which could correlate with reactivation of Hepatitis B while taking Hepatitis C therapy such as jaundice, abdominal pain, nausea, vomiting, diarrhea, fatigue, etc. He will report to ED if these occur.      4/2/2021  He will have hepatitis B labs today including hepatitis B core total antibody, hepatitis B surface antibody hepatitis B surface antigen which will help us determine if he has any active hepatitis B infection.  Based on his history, if only has positive hepatitis B core total antibody positive, he has had a previous exposure to hepatitis B and no current infection.     History of alcoholism   He does report a past history of alcoholism.  Not currently drinking.  He was directed to continue abstinence from alcohol.     History of drug abuse in remission   He has a long history of IV drug use.  He is not currently using any illicit drugs.  He takes Suboxone as recommended by his physician.  He understands that treatment of hepatitis C does not make him immune to any future infections if he continues to have risky behaviors.          Follow Up:   Return in about 3 months (around 9/11/2021) for recheck Hep C.      Isabel Gonzalez PA-C  [facility]  6/15/2021  16:17 EDT    Please note that portions of this note may have been completed with a voice recognition program. Efforts were made to edit the dictations, but occasionally words are mistranscribed.

## 2021-06-30 DIAGNOSIS — K21.9 GASTROESOPHAGEAL REFLUX DISEASE, UNSPECIFIED WHETHER ESOPHAGITIS PRESENT: ICD-10-CM

## 2021-06-30 RX ORDER — ESOMEPRAZOLE MAGNESIUM 40 MG/1
40 CAPSULE, DELAYED RELEASE ORAL
Qty: 30 CAPSULE | Refills: 2 | Status: SHIPPED | OUTPATIENT
Start: 2021-06-30 | End: 2021-10-08

## 2021-07-19 ENCOUNTER — OFFICE VISIT (OUTPATIENT)
Dept: UROLOGY | Facility: CLINIC | Age: 51
End: 2021-07-19

## 2021-07-19 VITALS
WEIGHT: 260 LBS | HEART RATE: 78 BPM | OXYGEN SATURATION: 98 % | DIASTOLIC BLOOD PRESSURE: 90 MMHG | SYSTOLIC BLOOD PRESSURE: 118 MMHG | BODY MASS INDEX: 38.51 KG/M2 | HEIGHT: 69 IN | RESPIRATION RATE: 18 BRPM

## 2021-07-19 DIAGNOSIS — R30.0 DYSURIA: ICD-10-CM

## 2021-07-19 LAB
BILIRUB BLD-MCNC: NEGATIVE MG/DL
CLARITY, POC: CLEAR
COLOR UR: YELLOW
GLUCOSE UR STRIP-MCNC: NEGATIVE MG/DL
KETONES UR QL: NEGATIVE
LEUKOCYTE EST, POC: NEGATIVE
NITRITE UR-MCNC: NEGATIVE MG/ML
PH UR: 7.5 [PH] (ref 5–8)
PROT UR STRIP-MCNC: NEGATIVE MG/DL
RBC # UR STRIP: NEGATIVE /UL
SP GR UR: 1.01 (ref 1–1.03)
UROBILINOGEN UR QL: NORMAL

## 2021-07-19 PROCEDURE — 99203 OFFICE O/P NEW LOW 30 MIN: CPT | Performed by: UROLOGY

## 2021-07-19 PROCEDURE — 51798 US URINE CAPACITY MEASURE: CPT | Performed by: UROLOGY

## 2021-07-19 RX ORDER — NALOXONE HYDROCHLORIDE 4 MG/.1ML
SPRAY NASAL
COMMUNITY
Start: 2021-05-24

## 2021-07-19 RX ORDER — ONDANSETRON 4 MG/1
TABLET, FILM COATED ORAL
COMMUNITY
Start: 2021-07-08

## 2021-07-19 RX ORDER — LORATADINE 10 MG/1
TABLET ORAL
COMMUNITY
Start: 2021-07-08

## 2021-07-19 NOTE — PROGRESS NOTES
Chief Complaint  LUTS    Referring Provider  Isabel Gonzalez PA-C    HPI  Mr. Mckeon is a 51 y.o. male with history of COPD, chronic pain, neuropathy, smoker who presents with ED and lUTS.  He is only really bothered by ED.    IPSS Questionnaire (AUA-7):  Over the past month…    1)  Incomplete Emptying  How often have you had a sensation of not emptying your bladder?  3 - About half the time   2)  Frequency  How often have you had to urinate less than every two hours? 2 - Less than half the time   3)  Intermittency  How often have you found you stopped and started again several times when you urinated?  2 - Less than half the time   4) Urgency  How often have you found it difficult to postpone urination?  4 - More than half the time   5) Weak Stream  How often have you had a weak urinary stream?  5 - Almost always   6) Straining  How often have you had to push or strain to begin urination?  0 - Not at all   7) Nocturia  How many times did you typically get up at night to urinate?  4 - 4 times   Total Score:  20       Quality of life due to urinary symptoms:  If you were to spend the rest of your life with your urinary condition the way it is now, how would you feel about that? 5-Unhappy   Urine Leakage (Incontinence) 0-No Leakage     + ED. No erections  Has tried cialis 20 in past. Tried 100mg viagra in past, helped a little but unable to penetrate    + chronic pain and     Past Medical History  Past Medical History:   Diagnosis Date   • Chronic back pain    • COPD (chronic obstructive pulmonary disease) (CMS/[facility])    • Hypertension    • Seizures (CMS/[facility])     isolated episode in 2019   • Snores    • Spider bite    • Tattoos      Past Surgical History  Past Surgical History:   Procedure Laterality Date   • ANKLE SURGERY Right    • BACK SURGERY  2006   • FOOT SURGERY Right    • HAND SURGERY      due to spider bite   • SPINE SURGERY       Medications    Current Outpatient Medications:   •  Albuterol Sulfate, sensor,  108 (90 Base) MCG/ACT aerosol powder , Inhale As Needed., Disp: , Rfl:   •  atenolol (TENORMIN) 50 MG tablet, Take 50 mg by mouth Daily., Disp: , Rfl:   •  bisacodyl (Dulcolax) 5 MG EC tablet, Follow instructions given at office, Disp: 4 tablet, Rfl: 0  •  buprenorphine-naloxone (SUBOXONE) 8-2 MG per SL tablet, Place 2 tablets under the tongue Daily., Disp: , Rfl:   •  chlorthalidone (HYGROTEN) 50 MG tablet, Take 50 mg by mouth Daily., Disp: , Rfl:   •  DULERA 100-5 MCG/ACT inhaler, , Disp: , Rfl:   •  esomeprazole (nexIUM) 40 MG capsule, TAKE 1 CAPSULE BY MOUTH EVERY MORNING BEFORE BREAKFAST., Disp: 30 capsule, Rfl: 2  •  Fluticasone Furoate-Vilanterol (Breo Ellipta) 200-25 MCG/INH inhaler, Inhale 1 puff Daily., Disp: , Rfl:   •  gabapentin (NEURONTIN) 800 MG tablet, 800 mg 2 (Two) Times a Day., Disp: , Rfl:   •  Glecaprevir-Pibrentasvir (Mavyret) 100-40 MG tablet, Take 3 tablets by mouth Daily. Take all 3 tablets at one time with food, Disp: 84 tablet, Rfl: 1  •  ibuprofen (ADVIL,MOTRIN) 800 MG tablet, Take 800 mg by mouth 3 (Three) Times a Day As Needed for Mild Pain ., Disp: , Rfl:   •  polyethylene glycol (MiraLax) 17 g packet, Take 17 g by mouth Daily., Disp: , Rfl:   •  polyethylene glycol (MIRALAX) 17 GM/SCOOP powder, Follow directions given at office, Disp: 238 g, Rfl: 0  •  vitamin D (ERGOCALCIFEROL) 1.25 MG (13162 UT) capsule capsule, Take 50,000 Units by mouth Every 7 (Seven) Days., Disp: , Rfl:   •  Vitamin D, Ergocalciferol, 50 MCG (2000 UT) capsule, Take  by mouth., Disp: , Rfl:   •  Allergy Relief 10 MG tablet, , Disp: , Rfl:   •  Narcan 4 MG/0.1ML nasal spray, , Disp: , Rfl:   •  ondansetron (ZOFRAN) 4 MG tablet, , Disp: , Rfl:   •  potassium chloride 10 MEQ CR tablet, Take 1 tablet by mouth 2 (Two) Times a Day., Disp: 14 tablet, Rfl: 0    Allergies  No Known Allergies    Social History  Social History     Socioeconomic History   • Marital status: Single     Spouse name: Not on file   • Number of  "children: Not on file   • Years of education: Not on file   • Highest education level: Not on file   Tobacco Use   • Smoking status: Current Every Day Smoker     Packs/day: 1.50     Years: 18.00     Pack years: 27.00     Types: Cigarettes     Start date: 2003   • Smokeless tobacco: Never Used   Vaping Use   • Vaping Use: Never used   Substance and Sexual Activity   • Alcohol use: No   • Drug use: Not Currently   • Sexual activity: Defer       Family History  He has no family history of prostate cancer  Family History   Problem Relation Age of Onset   • Diabetes Father    • Hypertension Father    • Cancer Mother    • Colon cancer Neg Hx    • Cirrhosis Neg Hx    • Liver cancer Neg Hx    • Liver disease Neg Hx        Review of Systems  A review of systems was notable for constipation, Suboxone usage.    Physical Exam  Visit Vitals  /90   Pulse 78   Resp 18   Ht 175.3 cm (69.02\")   Wt 118 kg (260 lb)   SpO2 98%   BMI 38.38 kg/m²     Physical exam notable for obese.    Genitourinary  Penis: circumcised buried penis, glans normal, no penile discharge.  Multiple excoriations all over body.  Positive escutcheon.    Testes: descended bilaterally, no masses, nontender to palpation. Remainder of scrotal contents normal. No hernia appreciated.    Labs  No results found for: PSA    Brief Urine Lab Results  (Last result in the past 365 days)      Color   Clarity   Blood   Leuk Est   Nitrite   Protein   CREAT   Urine HCG        07/19/21 1136 Yellow Clear Negative Negative Negative Negative             PVR  Post-void residual performed by staff - 13 mL      Assessment  Mr. Mckeon is a 51 y.o. male who presents with LUTS and ED. He has failed multiple Rx for the erectile dysfunction.  We discussed options such as intracavernosal injections and penile implant surgery.  He has short stretch length and mostly buried penis, therefore penile prosthesis would not be a good option.  He is interested in intracavernosal injections " and will think about this.    Plan  1.  Follow up ARACELI Quesada MD

## 2021-07-28 ENCOUNTER — LAB (OUTPATIENT)
Dept: LAB | Facility: HOSPITAL | Age: 51
End: 2021-07-28

## 2021-07-28 ENCOUNTER — TRANSCRIBE ORDERS (OUTPATIENT)
Dept: LAB | Facility: HOSPITAL | Age: 51
End: 2021-07-28

## 2021-07-28 DIAGNOSIS — K21.9 CHALASIA OF LOWER ESOPHAGEAL SPHINCTER: ICD-10-CM

## 2021-07-28 DIAGNOSIS — M54.42 ACUTE BACK PAIN WITH SCIATICA, LEFT: ICD-10-CM

## 2021-07-28 DIAGNOSIS — K59.00 CONSTIPATION, UNSPECIFIED CONSTIPATION TYPE: ICD-10-CM

## 2021-07-28 DIAGNOSIS — J44.9 OBSTRUCTIVE CHRONIC BRONCHITIS WITHOUT EXACERBATION (HCC): ICD-10-CM

## 2021-07-28 DIAGNOSIS — B18.2 CHRONIC HEPATITIS C WITHOUT HEPATIC COMA (HCC): ICD-10-CM

## 2021-07-28 DIAGNOSIS — I10 ESSENTIAL HYPERTENSION, MALIGNANT: ICD-10-CM

## 2021-07-28 DIAGNOSIS — E78.2 MIXED HYPERLIPIDEMIA: ICD-10-CM

## 2021-07-28 DIAGNOSIS — G57.93 NEUROPATHIC PAIN, LEG, BILATERAL: ICD-10-CM

## 2021-07-28 DIAGNOSIS — E55.9 AVITAMINOSIS D: ICD-10-CM

## 2021-07-28 DIAGNOSIS — E87.6 HYPOPOTASSEMIA: ICD-10-CM

## 2021-07-28 DIAGNOSIS — E87.6 HYPOPOTASSEMIA: Primary | ICD-10-CM

## 2021-07-28 PROCEDURE — 36415 COLL VENOUS BLD VENIPUNCTURE: CPT

## 2021-09-10 DIAGNOSIS — B18.2 CHRONIC HEPATITIS C WITHOUT HEPATIC COMA (HCC): Primary | ICD-10-CM

## 2021-10-01 ENCOUNTER — APPOINTMENT (OUTPATIENT)
Dept: PHARMACY | Facility: HOSPITAL | Age: 51
End: 2021-10-01

## 2021-10-07 DIAGNOSIS — K21.9 GASTROESOPHAGEAL REFLUX DISEASE, UNSPECIFIED WHETHER ESOPHAGITIS PRESENT: ICD-10-CM

## 2021-10-08 RX ORDER — ESOMEPRAZOLE MAGNESIUM 40 MG/1
CAPSULE, DELAYED RELEASE ORAL
Qty: 30 CAPSULE | Refills: 2 | Status: SHIPPED | OUTPATIENT
Start: 2021-10-08 | End: 2021-12-27

## 2021-12-27 DIAGNOSIS — K21.9 GASTROESOPHAGEAL REFLUX DISEASE, UNSPECIFIED WHETHER ESOPHAGITIS PRESENT: ICD-10-CM

## 2021-12-27 RX ORDER — ESOMEPRAZOLE MAGNESIUM 40 MG/1
CAPSULE, DELAYED RELEASE ORAL
Qty: 30 CAPSULE | Refills: 2 | Status: SHIPPED | OUTPATIENT
Start: 2021-12-27 | End: 2022-04-07

## 2022-01-02 ENCOUNTER — HOSPITAL ENCOUNTER (EMERGENCY)
Facility: HOSPITAL | Age: 52
Discharge: HOME | End: 2022-01-02
Attending: EMERGENCY MEDICINE
Payer: COMMERCIAL

## 2022-01-02 VITALS
OXYGEN SATURATION: 98 % | DIASTOLIC BLOOD PRESSURE: 78 MMHG | SYSTOLIC BLOOD PRESSURE: 130 MMHG | WEIGHT: 254 LBS | RESPIRATION RATE: 18 BRPM | HEART RATE: 85 BPM | TEMPERATURE: 98.3 F | HEIGHT: 71 IN | BODY MASS INDEX: 35.56 KG/M2

## 2022-01-02 DIAGNOSIS — S30.21XA CONTUSION OF PENIS, INITIAL ENCOUNTER: Primary | ICD-10-CM

## 2022-01-02 PROCEDURE — 99282 EMERGENCY DEPT VISIT SF MDM: CPT

## 2022-01-02 NOTE — ED TRIAGE NOTES
Pt states that he was climbing over the bed of his truck on Thursday. Pt states that yesterday he noticed a blue spot on his penis. Pt denies pain at this time.

## 2022-01-02 NOTE — ED PROVIDER NOTES
[address] [facility] ENCOUNTER      Pt Name: Bozena Jalloh  MRN: [id number]  YOB: 1970  Date of evaluation: 1/2/2022  Provider: Amaya Fisher DO    CHIEF COMPLAINT       Chief Complaint   Patient presents with    Personal Problem     Penile swelling         HISTORY OF PRESENT ILLNESS  (Location/Symptom, Timing/Onset, Context/Setting, Quality, Duration, Modifying Factors, Severity.)   Bozena Jalloh is a 54 y.o. male who presents to the emergency department complaint that he had a straddle type injury on Thursday and bruised his penis. He states no pain male but he has a bruise on his penis and thought he should get it looked at. Nursing notes were reviewed. REVIEW OFSYSTEMS    (2-9 systems for level 4, 10 or more for level 5)   ROS:  General:  No fevers, no chills, no weakness  Cardiovascular:  No chest pain, no palpitations  Respiratory:  No shortness of breath, no cough, no wheezing  Gastrointestinal:  No pain, no nausea, no vomiting, no diarrhea  Musculoskeletal:  No muscle pain, no joint pain  Skin:  No rash, no easy bruising  Neurologic:  No speech problems, no headache, no extremity weakness  Psychiatric:  No anxiety  Genitourinary:  No dysuria, no hematuria    Except as noted above the remainder of the review of systems was reviewed and negative. PAST MEDICAL HISTORY     Past Medical History:   Diagnosis Date    Chronic back pain     Hypertension     IV drug abuse ([facility] [place] [address] 2011    7 years clean         SURGICAL HISTORY       Past Surgical History:   Procedure Laterality Date    BACK SURGERY  2006    FOOT SURGERY Right     FRACTURE SURGERY Right     ankle    SPINE SURGERY           CURRENT MEDICATIONS       Previous Medications    BUPRENORPHINE-NALOXONE (SUBOXONE) 8-2 MG SUBL SL TABLET    Place 1 tablet under the tongue 2 times daily.      CHLORTHALIDONE (HYGROTEN) 50 MG TABLET    Take 50 mg by mouth daily ERGOCALCIFEROL (ERGOCALCIFEROL) 40888 UNITS CAPSULE    Take 50,000 Units by mouth once a week    FLUTICASONE FUROATE-VILANTEROL (BREO ELLIPTA) 200-25 MCG/INH AEPB INHALER    Inhale 1 puff into the lungs daily    GABAPENTIN (NEURONTIN) 600 MG TABLET    Take 800 mg by mouth 3 times daily. ONDANSETRON (ZOFRAN ODT) 4 MG DISINTEGRATING TABLET    Take 1 tablet by mouth every 4 hours as needed for Nausea or Vomiting       ALLERGIES     Patient has no known allergies. FAMILY HISTORY       Family History   Problem Relation Age of Onset    High Blood Pressure Father     Diabetes Father           SOCIAL HISTORY       Social History     Socioeconomic History    Marital status: Single     Spouse name: None    Number of children: None    Years of education: None    Highest education level: None   Occupational History    None   Tobacco Use    Smoking status: Current Every Day Smoker     Packs/day: 1.00     Years: 8.00     Pack years: 8.00     Types: Cigarettes    Smokeless tobacco: Never Used   Vaping Use    Vaping Use: Never used   Substance and Sexual Activity    Alcohol use: Not Currently    Drug use: Not Currently    Sexual activity: None   Other Topics Concern    None   Social History Narrative    None     Social Determinants of Health     Financial Resource Strain:     Difficulty of Paying Living Expenses: Not on file   Food Insecurity:     Worried About Running Out of Food in the Last Year: Not on file    Michelle of Food in the Last Year: Not on file   Transportation Needs:     Lack of Transportation (Medical): Not on file    Lack of Transportation (Non-Medical):  Not on file   Physical Activity:     Days of Exercise per Week: Not on file    Minutes of Exercise per Session: Not on file   Stress:     Feeling of Stress : Not on file   Social Connections:     Frequency of Communication with Friends and Family: Not on file    Frequency of Social Gatherings with Friends and Family: Not on file   [address] [place] [address] Attends Sikhism Services: Not on file    Active Member of Clubs or Organizations: Not on file    Attends Club or Organization Meetings: Not on file    Marital Status: Not on file   Intimate Partner Violence:     Fear of Current or Ex-Partner: Not on file    Emotionally Abused: Not on file    Physically Abused: Not on file    Sexually Abused: Not on file   Housing Stability:     Unable to Pay for Housing in the Last Year: Not on file    Number of [place] in the Last Year: Not on file    Unstable Housing in the Last Year: Not on file         PHYSICAL EXAM    (up to 7 for level 4, 8 or more for level 5)     ED Triage Vitals [01/02/22 1308]   BP Temp Temp Source Pulse Resp SpO2 Height Weight   130/78 98.3 °F (36.8 °C) Oral 85 18 98 % 5' 11\" (1.803 m) 254 lb (115.2 kg)       Physical Exam  General :Patient is awake, alert, oriented, in no acute distress, nontoxic appearing  HEENT: Pupils are equally round and reactive to light, EOMI, conjunctivae clear. Oral mucosa is moist, no exudate. Uvula is midline  Neck: Neck is supple, full range of motion, trachea midline  Cardiac: Heart regular rate, rhythm, no murmurs, rubs, or gallops  Lungs: Lungs are clear to auscultation, there is no wheezing, rhonchi, or rales. There is no use of accessory muscles. Chest wall: There is no tenderness to palpation over the chest wall or over ribs  Abdomen: Abdomen is soft, nontender, nondistended. There is no firm or pulsatile masses, no rebound rigidity or guarding. Musculoskeletal: 5 out of 5 strength in all 4 extremities. No focal muscle deficits are appreciated  Genitourinary: Testicles nontender with normal lie. Cremaster reflex intact bilaterally. Circumcised. Patient has small contusion on the side of his penis. Neuro: Motor intact, sensory intact, level of consciousness is normal, will gait.   Dermatology: Skin is warm and dry  Psych: Mentation is grossly normal, cognition is grossly normal. Affect is encounter          DISPOSITION/PLAN   DISPOSITION Decision To Discharge 01/02/2022 01:39:33 PM      PATIENT REFERRED TO:  Patricia Kraft, WOODY - CNP  [address] [place] [address]  [phone]    In 2 days      [facility] Emergency Department  [facility] [address] [facility]  [phone]    As needed, If symptoms worsen      DISCHARGE MEDICATIONS:  New Prescriptions    No medications on file       Comment: Please note this report has been produced using speech recognition software and may contain errorsrelated to that system including errors in grammar, punctuation, and spelling, as well as words and phrases that may be inappropriate. If there are any questions or concerns please feel free to contact the dictating providerfor clarification.     Barry Moore DO  Attending Emergency Physician              Barry Moore DO  01/02/22 7751

## 2022-03-03 ENCOUNTER — HOSPITAL ENCOUNTER (EMERGENCY)
Facility: HOSPITAL | Age: 52
Discharge: HOME | End: 2022-03-03
Attending: EMERGENCY MEDICINE
Payer: COMMERCIAL

## 2022-03-03 VITALS
RESPIRATION RATE: 18 BRPM | SYSTOLIC BLOOD PRESSURE: 136 MMHG | WEIGHT: 254 LBS | DIASTOLIC BLOOD PRESSURE: 78 MMHG | HEART RATE: 80 BPM | TEMPERATURE: 97.9 F | BODY MASS INDEX: 35.56 KG/M2 | OXYGEN SATURATION: 96 % | HEIGHT: 71 IN
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
## 2022-03-03 DIAGNOSIS — L03.012 CELLULITIS OF FINGER OF LEFT HAND: Primary | ICD-10-CM

## 2022-03-03 PROCEDURE — 99282 EMERGENCY DEPT VISIT SF MDM: CPT

## 2022-03-03 RX ORDER — CLINDAMYCIN HYDROCHLORIDE 300 MG/1
300 CAPSULE ORAL 4 TIMES DAILY
Qty: 40 CAPSULE | Refills: 0 | Status: SHIPPED | OUTPATIENT
Start: 2022-03-03 | End: 2022-03-13

## 2022-03-03 RX ORDER — EMTRICITABINE AND TENOFOVIR DISOPROXIL FUMARATE 200; 300 MG/1; MG/1
1 TABLET, FILM COATED ORAL DAILY
Qty: 30 TABLET | Refills: 1 | Status: SHIPPED | OUTPATIENT
Start: 2022-03-03 | End: 2022-07-25

## 2022-03-03 ASSESSMENT — PAIN DESCRIPTION - ORIENTATION: ORIENTATION: LEFT

## 2022-03-03 ASSESSMENT — PAIN SCALES - GENERAL: PAINLEVEL_OUTOF10: 7

## 2022-03-03 ASSESSMENT — PAIN DESCRIPTION - LOCATION: LOCATION: FINGER (COMMENT WHICH ONE)

## 2022-03-03 NOTE — ED PROVIDER NOTES
62 [facility] ENCOUNTER      Pt Name: Cary Cherry  MRN: [id number]  YOB: 1970  Date of evaluation: 3/3/2022  Provider: Halima Monsivais DO    CHIEF COMPLAINT       Chief Complaint   Patient presents with    Body Fluid Exposure         HISTORY OF PRESENT ILLNESS  (Location/Symptom, Timing/Onset, Context/Setting, Quality, Duration, Modifying Factors, Severity.)   Cary Cherry is a 50 y.o. male who presents to the emergency department with a chief complaint of accidentally got stuck with a dirty needle which he picked up at a gas station 2 days ago. Patient has a history of hepatitis C. States he is afraid kids will get stuck with the needle. Nursing notes were reviewed. REVIEW OFSYSTEMS    (2-9 systems for level 4, 10 or more for level 5)   ROS:  General:  No fevers, no chills, no weakness  Cardiovascular:  No chest pain, no palpitations  Respiratory:  No shortness of breath, no cough, no wheezing  Gastrointestinal:  No pain, no nausea, no vomiting, no diarrhea  Musculoskeletal:  No muscle pain, no joint pain  Skin:  No rash, no easy bruising  Neurologic:  No speech problems, no headache, no extremity weakness  Psychiatric:  No anxiety  Genitourinary:  No dysuria, no hematuria    Except as noted above the remainder of the review of systems was reviewed and negative. PAST MEDICAL HISTORY     Past Medical History:   Diagnosis Date    Chronic back pain     Hypertension     IV drug abuse ([facility] [address] 2011    7 years clean         SURGICAL HISTORY       Past Surgical History:   Procedure Laterality Date    BACK SURGERY  2006    FOOT SURGERY Right     FRACTURE SURGERY Right     ankle    SPINE SURGERY           CURRENT MEDICATIONS       Previous Medications    BUPRENORPHINE-NALOXONE (SUBOXONE) 8-2 MG SUBL SL TABLET    Place 1 tablet under the tongue 2 times daily.      CHLORTHALIDONE (HYGROTEN) 50 MG TABLET    Take 50 mg by mouth daily    ERGOCALCIFEROL (ERGOCALCIFEROL) 20462 UNITS CAPSULE    Take 50,000 Units by mouth once a week    FLUTICASONE FUROATE-VILANTEROL (BREO ELLIPTA) 200-25 MCG/INH AEPB INHALER    Inhale 1 puff into the lungs daily    GABAPENTIN (NEURONTIN) 600 MG TABLET    Take 800 mg by mouth 3 times daily. ONDANSETRON (ZOFRAN ODT) 4 MG DISINTEGRATING TABLET    Take 1 tablet by mouth every 4 hours as needed for Nausea or Vomiting       ALLERGIES     Patient has no known allergies. FAMILY HISTORY       Family History   Problem Relation Age of Onset    High Blood Pressure Father     Diabetes Father           SOCIAL HISTORY       Social History     Socioeconomic History    Marital status: Single     Spouse name: None    Number of children: None    Years of education: None    Highest education level: None   Occupational History    None   Tobacco Use    Smoking status: Current Every Day Smoker     Packs/day: 1.00     Years: 8.00     Pack years: 8.00     Types: Cigarettes    Smokeless tobacco: Never Used   Vaping Use    Vaping Use: Never used   Substance and Sexual Activity    Alcohol use: Not Currently    Drug use: Not Currently    Sexual activity: None   Other Topics Concern    None   Social History Narrative    None     Social Determinants of Health     Financial Resource Strain:     Difficulty of Paying Living Expenses: Not on file   Food Insecurity:     Worried About Running Out of Food in the Last Year: Not on file    Michelle of Food in the Last Year: Not on file   Transportation Needs:     Lack of Transportation (Medical): Not on file    Lack of Transportation (Non-Medical):  Not on file   Physical Activity:     Days of Exercise per Week: Not on file    Minutes of Exercise per Session: Not on file   Stress:     Feeling of Stress : Not on file   Social Connections:     Frequency of Communication with Friends and Family: Not on file    Frequency of Social Gatherings with Friends and Family: Not on file    Attends Pentecostalism Services: Not on file    Active Member of Clubs or Organizations: Not on file    Attends Club or Organization Meetings: Not on file    Marital Status: Not on file   Intimate Partner Violence:     Fear of Current or Ex-Partner: Not on file    Emotionally Abused: Not on file    Physically Abused: Not on file    Sexually Abused: Not on file   Housing Stability:     Unable to Pay for Housing in the Last Year: Not on file    Number of [place] in the Last Year: Not on file    Unstable Housing in the Last Year: Not on file         PHYSICAL EXAM    (up to 7 for level 4, 8 or more for level 5)     ED Triage Vitals [03/03/22 1330]   BP Temp Temp Source Pulse Resp SpO2 Height Weight   136/78 97.9 °F (36.6 °C) Oral 80 18 96 % 5' 11\" (1.803 m) 254 lb (115.2 kg)       Physical Exam  General :Patient is awake, alert, oriented, in no acute distress, nontoxic appearing  HEENT: Pupils are equally round and reactive to light, EOMI, conjunctivae clear. Oral mucosa is moist, no exudate. Uvula is midline  Neck: Neck is supple, full range of motion, trachea midline  Cardiac: Heart regular rate, rhythm, no murmurs, rubs, or gallops  Lungs: Lungs are clear to auscultation, there is no wheezing, rhonchi, or rales. There is no use of accessory muscles. Chest wall: There is no tenderness to palpation over the chest wall or over ribs  Abdomen: Abdomen is soft, nontender, nondistended. There is no firm or pulsatile masses, no rebound rigidity or guarding. Musculoskeletal: 5 out of 5 strength in all 4 extremities. No focal muscle deficits are appreciated. Mild swelling of left fourth finger and mild tenderness. Neuro: Motor intact, sensory intact, level of consciousness is normal.  Dermatology: Skin is warm and dry  Psych: Mentation is grossly normal, cognition is grossly normal. Affect is appropriate.       DIAGNOSTIC RESULTS     EKG: All EKG's are interpreted by the Emergency Department Physician who either signs or Co-signs this chart in the absenceof a cardiologist.      RADIOLOGY:   Non-plain film images such as CT, Ultrasound and MRI are read by the radiologist. Plain radiographic images are visualized and preliminarily interpreted by the emergency physician with the below findings:      ? Radiologist's Report Reviewed:  No orders to display         ED BEDSIDE ULTRASOUND:   Performed by ED Physician - none    LABS:    I have reviewed and interpreted all of the currently available lab results from this visit (ifapplicable):  No results found for this visit on 03/03/22. All other labs were within normal range or not returned as of this dictation. EMERGENCY DEPARTMENT COURSE and DIFFERENTIAL DIAGNOSIS/MDM:   Vitals:    Vitals:    03/03/22 1330   BP: 136/78   Pulse: 80   Resp: 18   Temp: 97.9 °F (36.6 °C)   TempSrc: Oral   SpO2: 96%   Weight: 254 lb (115.2 kg)   Height: 5' 11\" (1.803 m)       MEDICATIONS ADMINISTERED IN ED:  Medications - No data to display    Clinically appears to be mild injury. However since it is a strange found at a gas station discussed with him the risk for HIV. I discussed the pros and cons of prophylactic regimen. He requested that I write his the medication for this states that he is not sure if he will take this or not. I explained to him that it must be started within 72 hours of the exposure. Patient states that he understands this and will think about whether or not to take it. Told him that I would prescribe the medication should he decide he wishes to take it. Risks and benefits of this medication which included Tradjenta and Isentress. Also explained to him that I would place him on antibiotic called clindamycin 300 mg 4 times a day for he has mild finger cellulitis. The patient will follow-up with their PCP in 1-2 days for reevaluation.   If the patient or family members have anyfurther concerns or any worsening symptoms they will return to the ED for reevaluation. CONSULTS:  None    PROCEDURES:  Procedures    CRITICAL CARE TIME    Total Critical Care time was 0 minutes, excluding separately reportable procedures. There was a high probability of clinically significant/life threatening deterioration in the patient's condition which required my urgent intervention. FINAL IMPRESSION      1. Cellulitis of finger of left hand    2. Needlestick injury accident with exposure to body fluid          DISPOSITION/PLAN   DISPOSITION Decision To Discharge 03/03/2022 02:10:03 PM      PATIENT REFERRED TO:  Jared Prado, [address] [facility]  [phone]    In 2 days      [facility] Emergency Department  [facility]  [address] [facility]  [phone]    As needed, If symptoms worsen      DISCHARGE MEDICATIONS:  New Prescriptions    CLINDAMYCIN (CLEOCIN) 300 MG CAPSULE    Take 1 capsule by mouth 4 times daily for 10 days    EMTRICITABINE-TENOFOVIR (TRUVADA) 200-300 MG PER TABLET    Take 1 tablet by mouth daily    RALTEGRAVIR (ISENTRESS) 400 MG TABLET    Take 1 tablet by mouth 2 times daily       Comment: Please note this report has been produced using speech recognition software and may contain errorsrelated to that system including errors in grammar, punctuation, and spelling, as well as words and phrases that may be inappropriate. If there are any questions or concerns please feel free to contact the dictating providerfor clarification.     Pedro Cancino DO  Attending Emergency Physician              Pedro Cancino DO  03/03/22 9093

## 2022-03-03 NOTE — ED NOTES
Pt states he picked a dirty needle up in a parking lot and accidentally stuck himself in the left ring finger 2 days ago. Pt has redness and swelling to left hand.       Ev Deleon RN  03/03/22 9710

## 2022-04-03 ENCOUNTER — HOSPITAL ENCOUNTER (EMERGENCY)
Facility: HOSPITAL | Age: 52
Discharge: HOME | End: 2022-04-03
Attending: HOSPITALIST
Payer: COMMERCIAL

## 2022-04-03 ENCOUNTER — APPOINTMENT (OUTPATIENT)
Dept: GENERAL RADIOLOGY | Facility: HOSPITAL | Age: 52
End: 2022-04-03
Payer: COMMERCIAL

## 2022-04-03 VITALS
WEIGHT: 250 LBS | BODY MASS INDEX: 35 KG/M2 | HEART RATE: 84 BPM | TEMPERATURE: 98.1 F | HEIGHT: 71 IN | DIASTOLIC BLOOD PRESSURE: 76 MMHG | RESPIRATION RATE: 22 BRPM | SYSTOLIC BLOOD PRESSURE: 114 MMHG | OXYGEN SATURATION: 99 %

## 2022-04-03 DIAGNOSIS — J18.9 PNEUMONIA OF RIGHT LOWER LOBE DUE TO INFECTIOUS ORGANISM: Primary | ICD-10-CM

## 2022-04-03 LAB — SARS-COV-2, NAAT: NOT DETECTED

## 2022-04-03 PROCEDURE — 99283 EMERGENCY DEPT VISIT LOW MDM: CPT

## 2022-04-03 PROCEDURE — 6370000000 HC RX 637 (ALT 250 FOR IP): Performed by: HOSPITALIST

## 2022-04-03 PROCEDURE — 71045 X-RAY EXAM CHEST 1 VIEW: CPT

## 2022-04-03 PROCEDURE — 87635 SARS-COV-2 COVID-19 AMP PRB: CPT

## 2022-04-03 RX ORDER — ALBUTEROL SULFATE 90 UG/1
2 AEROSOL, METERED RESPIRATORY (INHALATION) EVERY 6 HOURS PRN
COMMUNITY

## 2022-04-03 RX ORDER — LEVOFLOXACIN 500 MG/1
500 TABLET, FILM COATED ORAL DAILY
Qty: 10 TABLET | Refills: 0 | Status: SHIPPED | OUTPATIENT
Start: 2022-04-03 | End: 2022-04-13

## 2022-04-03 RX ORDER — ASPIRIN 81 MG/1
81 TABLET, CHEWABLE ORAL DAILY
COMMUNITY

## 2022-04-03 RX ORDER — IBUPROFEN 800 MG/1
800 TABLET ORAL EVERY 8 HOURS PRN
COMMUNITY
End: 2022-07-14 | Stop reason: ALTCHOICE

## 2022-04-03 RX ORDER — LEVOFLOXACIN 500 MG/1
500 TABLET, FILM COATED ORAL ONCE
Status: COMPLETED | OUTPATIENT
Start: 2022-04-03 | End: 2022-04-03

## 2022-04-03 RX ORDER — ATENOLOL 50 MG/1
50 TABLET ORAL DAILY
COMMUNITY

## 2022-04-03 RX ORDER — GUAIFENESIN 600 MG/1
1200 TABLET, EXTENDED RELEASE ORAL 2 TIMES DAILY
Qty: 28 TABLET | Refills: 0 | Status: SHIPPED | OUTPATIENT
Start: 2022-04-03 | End: 2022-04-10

## 2022-04-03 RX ORDER — ACETAMINOPHEN 160 MG
2000 TABLET,DISINTEGRATING ORAL DAILY
COMMUNITY

## 2022-04-03 RX ADMIN — LEVOFLOXACIN 500 MG: 500 TABLET, FILM COATED ORAL at 16:25

## 2022-04-03 NOTE — ED PROVIDER NOTES
[facility] ENCOUNTER      Pt Name: Fabiana Reece  MRN: [id number]  YOB: 1970  Date of evaluation: 4/3/2022  Provider: Belkys Butler, [address] [place] [address]       Chief Complaint   Patient presents with    Cough     patient trying to cough something up but unable to, smothering, onset 4 days ago, denies fever / pain / exposure to covid or flu         HISTORY OF PRESENT ILLNESS  (Location/Symptom, Timing/Onset, Context/Setting, Quality, Duration, Modifying Factors, Severity.)   Fabiana Reece is a 50 y.o. male who presents to the emergency department for a cough. Patient states he had a cough for about a week. States that when he lays down at night it becomes productive and he starts to get stuff up. States that when he wakes up in the morning he has a lot of nasal drainage and discharge. States that the sputum and drainage from his nose is greenish in color. Denies any fevers or chills. Denies any headaches. Denies any body aches. Denies any sore throat. Denies any chest pain or shortness of breath with the symptoms. Patient states that he did contact his primary care provider last week and they were going to call in prescription for some Mucinex but unfortunately states he never did get the prescription. Denies any sick contacts. Vaccinated with the [place] but no booster. Nursing notes were reviewed.     REVIEW OFSYSTEMS    (2-9 systems for level 4, 10 or more for level 5)   ROS:  General:  No fevers, no chills, no weakness  Cardiovascular:  No chest pain, no palpitations  Respiratory:  No shortness of breath, +cough, no wheezing  Gastrointestinal:  No pain, no nausea, no vomiting, no diarrhea  Musculoskeletal:  No muscle pain, no joint pain  Skin:  No rash, no easy bruising  Neurologic:  No speech problems, no headache, no extremity weakness  Psychiatric:  No anxiety  Genitourinary:  No dysuria, no hematuria  ENT: +nasal congestion/discharge    Except as noted above the remainder of the review of systems was reviewed and negative. PAST MEDICAL HISTORY     Past Medical History:   Diagnosis Date    Chronic back pain     Hypertension     IV drug abuse ([address] 2011    7 years clean         SURGICAL HISTORY       Past Surgical History:   Procedure Laterality Date    BACK SURGERY  2006    FOOT SURGERY Right     FRACTURE SURGERY Right     ankle    SPINE SURGERY           CURRENT MEDICATIONS       Previous Medications    ALBUTEROL SULFATE HFA (VENTOLIN HFA) 108 (90 BASE) MCG/ACT INHALER    Inhale 2 puffs into the lungs every 6 hours as needed for Wheezing    ASPIRIN 81 MG CHEWABLE TABLET    Take 81 mg by mouth daily    ATENOLOL (TENORMIN) 50 MG TABLET    Take 50 mg by mouth daily    BUPRENORPHINE HCL-NALOXONE HCL (SUBOXONE SL)    Place under the tongue Takes 3 films daily    CHOLECALCIFEROL (VITAMIN D3) 50 MCG (2000 UT) CAPS    Take 2,000 Units by mouth Daily    EMTRICITABINE-TENOFOVIR (TRUVADA) 200-300 MG PER TABLET    Take 1 tablet by mouth daily    ERGOCALCIFEROL (ERGOCALCIFEROL) 12175 UNITS CAPSULE    Take 50,000 Units by mouth once a week    FLUTICASONE FUROATE-VILANTEROL (BREO ELLIPTA) 200-25 MCG/INH AEPB INHALER    Inhale 1 puff into the lungs daily    GABAPENTIN (NEURONTIN) 600 MG TABLET    Take 800 mg by mouth 3 times daily. IBUPROFEN (ADVIL;MOTRIN) 800 MG TABLET    Take 800 mg by mouth every 8 hours as needed for Pain    OMEPRAZOLE PO    Take by mouth Unknown dosage       ALLERGIES     Patient has no known allergies.     FAMILY HISTORY       Family History   Problem Relation Age of Onset    High Blood Pressure Father     Diabetes Father           SOCIAL HISTORY       Social History     Socioeconomic History    Marital status: Single     Spouse name: None    Number of children: None    Years of education: None    Highest education level: None   Occupational History    None   Tobacco Use    Smoking (1.803 m) 250 lb (113.4 kg)       Physical Exam  General :Patient is awake, alert, oriented, in no acute distress, nontoxic appearing  HEENT: Pupils are equally round and reactive to light, EOMI, conjunctivae clear. Oral mucosa is moist, no exudate. Uvula is midline  Cardiac: Heart regular rate, rhythm, no murmurs, rubs, or gallops  Lungs: Lungs are clear to auscultation, there is no wheezing, rhonchi, or rales. There is no use of accessory muscles. Abdomen: Abdomen is soft, nontender, nondistended. There is no firm or pulsatile masses, no rebound rigidity or guarding. Musculoskeletal: 5 out of 5 strength in all 4 extremities. No focal muscle deficits are appreciated  Neuro: Motor intact, sensory intact, level of consciousness is normal  Dermatology: Skin is warm and dry  Psych: Mentation is grossly normal, cognition is grossly normal. Affect is appropriate. DIAGNOSTIC RESULTS     EKG: All EKG's are interpreted by the Emergency Department Physician who either signs or Co-signs this chart in the [address] a cardiologist.        RADIOLOGY:   Non-plain film images such as CT, Ultrasound and MRI are read by the radiologist. Plain radiographic images are visualized and preliminarily interpreted by the emergency physician with the below findings:      ? Radiologist's Report Reviewed:  XR CHEST PORTABLE   Final Result      Mild right basilar airspace disease with a small right pleural effusion. ED BEDSIDE ULTRASOUND:   Performed by ED Physician - none    LABS:    I have reviewed and interpreted all of the currently available lab results from this visit (ifapplicable):  Results for orders placed or performed during the hospital encounter of 04/03/22   COVID-19, Rapid    Specimen: Nasopharyngeal Swab   Result Value Ref Range    SARS-CoV-2, NAAT Not Detected Not Detected        All other labs were within normal range or not returned as of this dictation.     EMERGENCY DEPARTMENT COURSE and DIFFERENTIAL DIAGNOSIS/MDM:   Vitals:    Vitals:    04/03/22 1440 04/03/22 1452   BP: 114/76    Pulse: 84    Resp: 22    Temp: 98.1 °F (36.7 °C)    TempSrc: Oral    SpO2:  99%   Weight: 250 lb (113.4 kg)    Height: 5' 11\" (1.803 m)        MEDICATIONS ADMINISTERED IN ED:  Medications   levoFLOXacin (LEVAQUIN) tablet 500 mg (has no administration in time range)       After initial evaluation examination I did have a conversation with the patient about the upcoming plan, treatment possible disposition which she was agreeable to the times dictation. Patient sean we will perform rapid Covid screen since he has had the symptoms for a week if not longer. He is not had any fevers or chills or really does not meet criteria for influenza swab. Advised that since he has had a cough that is been productive for a week if not longer we wait and perform chest radiograph make sure there is no underlying pneumonic type process but the symptoms do seem more consistent with an acute bronchitis. Patient is agreeable this plan. Final disposition be determined once his radiological diagnostic studies been performed reviewed. Rapid Covid screen was    Portable chest radiograph read by radiology as mild right basilar airspace disease with a small right pleural effusion. Patient's radiological diagnostic studies were discussed with him and he does state his understanding. Patient advised the findings on the chest radiograph. He wants his prescription sent to a specific pharmacy but they are not open today so we not be able to pick them up until tomorrow. We will go ahead and give him a dose of Levaquin 500 mg orally here. He states he has a Ventolin inhaler already at home advised he needs to continue to use this 2 puffs every 4-6 hours over the next several days to sore to help with cough and make sure the airways are open. We will also write him for Mucinex 2 tablets twice a day for the next 7days to help keep his cough productive. Patient advised he does need to drink plenty of fluids to make sure that it is \"fluffy enough\" to be expectorated. Otherwise patient be discharged home in stable condition. The patient denies a does need to follow-up with his regular family physician within the next 1 to 2 days for evaluation. Also given instructions if symptoms worsens or new symptoms arise he should return back to emergency department for further evaluation work-up. CONSULTS:  None    PROCEDURES:  Procedures    CRITICAL CARE TIME    Total Critical Care time was 0 minutes, excluding separately reportable procedures. There was a high probability of clinically significant/life threatening deterioration in the patient's condition which required my urgent intervention. FINAL IMPRESSION      1. Pneumonia of right lower lobe due to infectious organism          DISPOSITION/PLAN   DISPOSITION        PATIENT REFERRED TO:  [address]  [address]  [phone]    In 2 days      [facility] Emergency Department  [facility] [address] [facility]  [phone]    As needed, If symptoms worsen      DISCHARGE MEDICATIONS:  New Prescriptions    GUAIFENESIN (MUCINEX) 600 MG EXTENDED RELEASE TABLET    Take 2 tablets by mouth 2 times daily for 7 days    LEVOFLOXACIN (LEVAQUIN) 500 MG TABLET    Take 1 tablet by mouth daily for 10 days       Comment: Please note this report has been produced using speech recognition software and may contain errorsrelated to that system including errors in grammar, punctuation, and spelling, as well as words and phrases that may be inappropriate. If there are any questions or concerns please feel free to contact the dictating providerfor clarification.     Germain Justice DO  Attending Emergency Physician              Germain Justice DO  04/03/22 6997

## 2022-04-03 NOTE — ED NOTES
Discharge instructions reviewed and medications discussed with verbalized understanding from patient. Patient had no further questions or concerns.         Andrea Phelps RN  04/03/22 9394

## 2022-04-07 DIAGNOSIS — K21.9 GASTROESOPHAGEAL REFLUX DISEASE, UNSPECIFIED WHETHER ESOPHAGITIS PRESENT: ICD-10-CM

## 2022-04-07 RX ORDER — ESOMEPRAZOLE MAGNESIUM 40 MG/1
40 CAPSULE, DELAYED RELEASE ORAL
Qty: 30 CAPSULE | Refills: 0 | Status: SHIPPED | OUTPATIENT
Start: 2022-04-07

## 2022-06-15 ENCOUNTER — LAB (OUTPATIENT)
Dept: LAB | Facility: HOSPITAL | Age: 52
End: 2022-06-15

## 2022-06-15 ENCOUNTER — TRANSCRIBE ORDERS (OUTPATIENT)
Dept: LAB | Facility: HOSPITAL | Age: 52
End: 2022-06-15

## 2022-06-15 DIAGNOSIS — E78.2 MIXED HYPERLIPIDEMIA: ICD-10-CM

## 2022-06-15 DIAGNOSIS — J44.9 OBSTRUCTIVE CHRONIC BRONCHITIS WITHOUT EXACERBATION: ICD-10-CM

## 2022-06-15 DIAGNOSIS — F32.9 MAJOR DEPRESSIVE DISORDER WITH SINGLE EPISODE, REMISSION STATUS UNSPECIFIED: ICD-10-CM

## 2022-06-15 DIAGNOSIS — G57.93 NEUROPATHIC PAIN, LEG, BILATERAL: ICD-10-CM

## 2022-06-15 DIAGNOSIS — F11.99 OPIOID USE WITH OPIOID-INDUCED DISORDER: ICD-10-CM

## 2022-06-15 DIAGNOSIS — R11.0 NAUSEA: ICD-10-CM

## 2022-06-15 DIAGNOSIS — F45.8 ANXIETY HYPERVENTILATION: ICD-10-CM

## 2022-06-15 DIAGNOSIS — Z72.0 TOBACCO ABUSE: ICD-10-CM

## 2022-06-15 DIAGNOSIS — M19.90 SENILE ARTHRITIS: ICD-10-CM

## 2022-06-15 DIAGNOSIS — I10 ESSENTIAL HYPERTENSION, MALIGNANT: ICD-10-CM

## 2022-06-15 DIAGNOSIS — E55.9 VITAMIN D DEFICIENCY: ICD-10-CM

## 2022-06-15 DIAGNOSIS — M54.51 VERTEBROGENIC LOW BACK PAIN: ICD-10-CM

## 2022-06-15 DIAGNOSIS — K21.9 CHALASIA OF LOWER ESOPHAGEAL SPHINCTER: ICD-10-CM

## 2022-06-15 DIAGNOSIS — F41.9 ANXIETY HYPERVENTILATION: ICD-10-CM

## 2022-06-15 DIAGNOSIS — M54.42 ACUTE BACK PAIN WITH SCIATICA, LEFT: ICD-10-CM

## 2022-06-15 DIAGNOSIS — L03.116 CELLULITIS OF LEFT FOOT: ICD-10-CM

## 2022-06-15 DIAGNOSIS — L03.116 CELLULITIS OF LEFT FOOT: Primary | ICD-10-CM

## 2022-06-15 DIAGNOSIS — K59.00 CONSTIPATION, UNSPECIFIED CONSTIPATION TYPE: ICD-10-CM

## 2022-06-15 PROCEDURE — 36415 COLL VENOUS BLD VENIPUNCTURE: CPT

## 2022-06-16 ENCOUNTER — LAB (OUTPATIENT)
Dept: LAB | Facility: HOSPITAL | Age: 52
End: 2022-06-16

## 2022-06-16 LAB
25(OH)D3 SERPL-MCNC: 59.1 NG/ML (ref 30–100)
ALBUMIN SERPL-MCNC: 4.3 G/DL (ref 3.5–5.2)
ALBUMIN/GLOB SERPL: 1.3 G/DL
ALP SERPL-CCNC: 103 U/L (ref 39–117)
ALT SERPL W P-5'-P-CCNC: 17 U/L (ref 1–41)
ANION GAP SERPL CALCULATED.3IONS-SCNC: 16.1 MMOL/L (ref 5–15)
AST SERPL-CCNC: 33 U/L (ref 1–40)
BASOPHILS # BLD AUTO: 0.06 10*3/MM3 (ref 0–0.2)
BASOPHILS NFR BLD AUTO: 0.6 % (ref 0–1.5)
BILIRUB SERPL-MCNC: 0.3 MG/DL (ref 0–1.2)
BUN SERPL-MCNC: 6 MG/DL (ref 6–20)
BUN/CREAT SERPL: 6.7 (ref 7–25)
CALCIUM SPEC-SCNC: 9.6 MG/DL (ref 8.6–10.5)
CHLORIDE SERPL-SCNC: 97 MMOL/L (ref 98–107)
CHOLEST SERPL-MCNC: 209 MG/DL (ref 0–200)
CO2 SERPL-SCNC: 20.9 MMOL/L (ref 22–29)
CREAT SERPL-MCNC: 0.89 MG/DL (ref 0.76–1.27)
DEPRECATED RDW RBC AUTO: 41 FL (ref 37–54)
EGFRCR SERPLBLD CKD-EPI 2021: 104.4 ML/MIN/1.73
EOSINOPHIL # BLD AUTO: 0.3 10*3/MM3 (ref 0–0.4)
EOSINOPHIL NFR BLD AUTO: 2.8 % (ref 0.3–6.2)
ERYTHROCYTE [DISTWIDTH] IN BLOOD BY AUTOMATED COUNT: 13.2 % (ref 12.3–15.4)
GLOBULIN UR ELPH-MCNC: 3.2 GM/DL
GLUCOSE SERPL-MCNC: 118 MG/DL (ref 65–99)
HBA1C MFR BLD: 5.7 % (ref 4.8–5.6)
HCT VFR BLD AUTO: 47.2 % (ref 37.5–51)
HDLC SERPL-MCNC: 27 MG/DL (ref 40–60)
HGB BLD-MCNC: 16.8 G/DL (ref 13–17.7)
HIV1 P24 AG SER QL: NORMAL
HIV1+2 AB SER QL: NORMAL
IMM GRANULOCYTES # BLD AUTO: 0.05 10*3/MM3 (ref 0–0.05)
IMM GRANULOCYTES NFR BLD AUTO: 0.5 % (ref 0–0.5)
IRON 24H UR-MRATE: 69 MCG/DL (ref 59–158)
IRON SATN MFR SERPL: 16 % (ref 20–50)
LDLC SERPL CALC-MCNC: 164 MG/DL (ref 0–100)
LDLC/HDLC SERPL: 6.02 {RATIO}
LYMPHOCYTES # BLD AUTO: 2.85 10*3/MM3 (ref 0.7–3.1)
LYMPHOCYTES NFR BLD AUTO: 26.8 % (ref 19.6–45.3)
MCH RBC QN AUTO: 30.3 PG (ref 26.6–33)
MCHC RBC AUTO-ENTMCNC: 35.6 G/DL (ref 31.5–35.7)
MCV RBC AUTO: 85.2 FL (ref 79–97)
MONOCYTES # BLD AUTO: 0.83 10*3/MM3 (ref 0.1–0.9)
MONOCYTES NFR BLD AUTO: 7.8 % (ref 5–12)
NEUTROPHILS NFR BLD AUTO: 6.56 10*3/MM3 (ref 1.7–7)
NEUTROPHILS NFR BLD AUTO: 61.5 % (ref 42.7–76)
NRBC BLD AUTO-RTO: 0 /100 WBC (ref 0–0.2)
PLATELET # BLD AUTO: 315 10*3/MM3 (ref 140–450)
PMV BLD AUTO: 10.2 FL (ref 6–12)
POTASSIUM SERPL-SCNC: 4.4 MMOL/L (ref 3.5–5.2)
PROT SERPL-MCNC: 7.5 G/DL (ref 6–8.5)
RBC # BLD AUTO: 5.54 10*6/MM3 (ref 4.14–5.8)
SODIUM SERPL-SCNC: 134 MMOL/L (ref 136–145)
TIBC SERPL-MCNC: 431 MCG/DL (ref 298–536)
TRANSFERRIN SERPL-MCNC: 289 MG/DL (ref 200–360)
TRIGL SERPL-MCNC: 97 MG/DL (ref 0–150)
TSH SERPL DL<=0.05 MIU/L-ACNC: 2.73 UIU/ML (ref 0.27–4.2)
VIT B12 BLD-MCNC: 864 PG/ML (ref 211–946)
VLDLC SERPL-MCNC: 18 MG/DL (ref 5–40)
WBC NRBC COR # BLD: 10.65 10*3/MM3 (ref 3.4–10.8)

## 2022-06-16 PROCEDURE — G0475 HIV COMBINATION ASSAY: HCPCS

## 2022-06-16 PROCEDURE — 80061 LIPID PANEL: CPT

## 2022-06-16 PROCEDURE — 84466 ASSAY OF TRANSFERRIN: CPT

## 2022-06-16 PROCEDURE — 84443 ASSAY THYROID STIM HORMONE: CPT

## 2022-06-16 PROCEDURE — 83540 ASSAY OF IRON: CPT

## 2022-06-16 PROCEDURE — 82607 VITAMIN B-12: CPT

## 2022-06-16 PROCEDURE — 85025 COMPLETE CBC W/AUTO DIFF WBC: CPT

## 2022-06-16 PROCEDURE — 80053 COMPREHEN METABOLIC PANEL: CPT

## 2022-06-16 PROCEDURE — 83036 HEMOGLOBIN GLYCOSYLATED A1C: CPT

## 2022-06-16 PROCEDURE — 82306 VITAMIN D 25 HYDROXY: CPT

## 2022-07-14 ENCOUNTER — HOSPITAL ENCOUNTER (EMERGENCY)
Facility: HOSPITAL | Age: 52
Discharge: HOME | End: 2022-07-14
Attending: EMERGENCY MEDICINE
Payer: COMMERCIAL

## 2022-07-14 VITALS
OXYGEN SATURATION: 97 % | HEIGHT: 71 IN | HEART RATE: 100 BPM | SYSTOLIC BLOOD PRESSURE: 148 MMHG | WEIGHT: 254 LBS | TEMPERATURE: 97.8 F | RESPIRATION RATE: 18 BRPM | DIASTOLIC BLOOD PRESSURE: 87 MMHG | BODY MASS INDEX: 35.56 KG/M2

## 2022-07-14 DIAGNOSIS — G56.03 BILATERAL CARPAL TUNNEL SYNDROME: Primary | ICD-10-CM

## 2022-07-14 DIAGNOSIS — R20.2 PARESTHESIAS IN LEFT HAND: ICD-10-CM

## 2022-07-14 DIAGNOSIS — M79.89 BILATERAL HAND SWELLING: ICD-10-CM

## 2022-07-14 PROCEDURE — 6370000000 HC RX 637 (ALT 250 FOR IP): Performed by: FAMILY MEDICINE

## 2022-07-14 PROCEDURE — 99283 EMERGENCY DEPT VISIT LOW MDM: CPT

## 2022-07-14 RX ORDER — NAPROXEN 500 MG/1
500 TABLET ORAL ONCE
Status: COMPLETED | OUTPATIENT
Start: 2022-07-14 | End: 2022-07-14

## 2022-07-14 RX ORDER — MELOXICAM 15 MG/1
15 TABLET ORAL DAILY
Qty: 15 TABLET | Refills: 0 | Status: SHIPPED | OUTPATIENT
Start: 2022-07-14 | End: 2022-07-25

## 2022-07-14 RX ORDER — PREDNISONE 20 MG/1
40 TABLET ORAL ONCE
Status: COMPLETED | OUTPATIENT
Start: 2022-07-14 | End: 2022-07-14

## 2022-07-14 RX ORDER — PREDNISONE 20 MG/1
TABLET ORAL
Qty: 9 TABLET | Refills: 0 | Status: SHIPPED | OUTPATIENT
Start: 2022-07-14 | End: 2022-07-25

## 2022-07-14 RX ADMIN — NAPROXEN 500 MG: 500 TABLET ORAL at 20:00

## 2022-07-14 RX ADMIN — PREDNISONE 40 MG: 20 TABLET ORAL at 19:59

## 2022-07-14 ASSESSMENT — PAIN SCALES - GENERAL: PAINLEVEL_OUTOF10: 0

## 2022-07-14 NOTE — ED PROVIDER NOTES
7:15 PM EDT  Jacboy Aburto was checked out to me by Dr. Raj Pratt. Please see his/her initial documentation for details of the patient's ED presentation, physical exam and completed studies. In brief, Jacoby Aburto is a 50 y.o. male that presents for having bilateral hand swelling that has been going on for 1 year. Pt has been seen by primary care and has been given fluid medicines, patient hasn't been having any pain to his hands until yesterday. Pt said that he is having pain mainly to his left hand with some numbness and tingling to the left 4-5th fingers radiating up to the left medial mid forearm. No neck pain, no history of cervical radiculopathy, no trauma to the hands, no redness or wounds to the skin. Pt states that it feels tight when he makes his fists. Pt still works as  using mainly [employer] and repetitive hand/wrist movement. Pt got concerned today because of the numbness and tingling to left hand. No fever. No cold hand, no dusking or blueness to the skin of hands. No history of PAD. I have reviewed and interpreted all of the currently available lab results from this visit (if applicable):  No results found for this visit on 07/14/22. MDM:  Pt reassessed. Pt with mild edema globally to bilateral hands, mainly to the dorsum of the hand. Normal cap refill of hands/fingers. Normal bilateral radial pulses. Negative Tinel or phalen testing. Feel this patient has carpal tunnel syndrome now affecting the left ulnar vs carpal tunnel nerve on left hand. Will abort labs as he just got them 3 weeks ago. Negative history of renal failure, no evidence of infection to need hematology. Pt not diabetic. Will give NSAIDs, prednisone and have him follow up with primary care for further Ortho assessment. Final Impression:  1. Bilateral carpal tunnel syndrome    2. Bilateral hand swelling New Problem   3.  Paresthesias in left hand New Problem       Pt to follow up with primary care for further assessment of hand swelling and numbness to left hand. Pt most likely with carpal tunnel and needs orthopedic assessment , possible EMyelography testing.  Pt given medications to try until seen by primary care    (Please note that portions of this note may have been completed with a voice recognition program. Efforts were made to edit the dictations but occasionally words are mis-transcribed.)    Marivel [address] DO  07/14/22 1955

## 2022-07-14 NOTE — ED PROVIDER NOTES
ALBUTEROL SULFATE HFA (VENTOLIN HFA) 108 (90 BASE) MCG/ACT INHALER    Inhale 2 puffs into the lungs every 6 hours as needed for Wheezing    ASPIRIN 81 MG CHEWABLE TABLET    Take 81 mg by mouth daily    ATENOLOL (TENORMIN) 50 MG TABLET    Take 50 mg by mouth daily    BUPRENORPHINE HCL-NALOXONE HCL (SUBOXONE SL)    Place under the tongue Takes 3 films daily    CHOLECALCIFEROL (VITAMIN D3) 50 MCG (2000 UT) CAPS    Take 2,000 Units by mouth Daily    EMTRICITABINE-TENOFOVIR (TRUVADA) 200-300 MG PER TABLET    Take 1 tablet by mouth daily    ERGOCALCIFEROL (ERGOCALCIFEROL) 52231 UNITS CAPSULE    Take 50,000 Units by mouth once a week    FLUTICASONE FUROATE-VILANTEROL (BREO ELLIPTA) 200-25 MCG/INH AEPB INHALER    Inhale 1 puff into the lungs daily    GABAPENTIN (NEURONTIN) 600 MG TABLET    Take 800 mg by mouth 3 times daily. IBUPROFEN (ADVIL;MOTRIN) 800 MG TABLET    Take 800 mg by mouth every 8 hours as needed for Pain    OMEPRAZOLE PO    Take by mouth Unknown dosage       ALLERGIES     Patient has no known allergies.     FAMILY HISTORY       Family History   Problem Relation Age of Onset    High Blood Pressure Father     Diabetes Father           SOCIAL HISTORY       Social History     Socioeconomic History    Marital status: Single     Spouse name: None    Number of children: None    Years of education: None    Highest education level: None   Occupational History    None   Tobacco Use    Smoking status: Current Every Day Smoker     Packs/day: 1.00     Years: 8.00     Pack years: 8.00     Types: Cigarettes    Smokeless tobacco: Never Used   Vaping Use    Vaping Use: Never used   Substance and Sexual Activity    Alcohol use: Not Currently    Drug use: Not Currently    Sexual activity: None   Other Topics Concern    None   Social History Narrative    None     Social Determinants of Health     Financial Resource Strain:     Difficulty of Paying Living Expenses: Not on file   Food Insecurity:     Worried About Running Out of Food in the Last Year: Not on file    Ran Out of Food in the Last Year: Not on file   Transportation Needs:     Lack of Transportation (Medical): Not on file    Lack of Transportation (Non-Medical): Not on file   Physical Activity:     Days of Exercise per Week: Not on file    Minutes of Exercise per Session: Not on file   Stress:     Feeling of Stress : Not on file   Social Connections:     Frequency of Communication with Friends and Family: Not on file    Frequency of Social Gatherings with Friends and Family: Not on file    Attends Church Services: Not on file    Active Member of [address] [place] [address] [employer] Card or Organizations: Not on file    Attends Club or Organization Meetings: Not on file    Marital Status: Not on file   Intimate Partner Violence:     Fear of Current or Ex-Partner: Not on file    Emotionally Abused: Not on file    Physically Abused: Not on file    Sexually Abused: Not on file   Housing Stability:     Unable to Pay for Housing in the Last Year: Not on file    Number of [place] in the Last Year: Not on file    Unstable Housing in the Last Year: Not on file         PHYSICAL EXAM    (up to 7 for level 4, 8 or more for level 5)     ED Triage Vitals   BP Temp Temp Source Heart Rate Resp SpO2 Height Weight   07/14/22 1627 07/14/22 1630 07/14/22 1630 07/14/22 1630 07/14/22 1630 07/14/22 1627 07/14/22 1631 07/14/22 1631   (!) 148/87 97.8 °F (36.6 °C) Oral 100 18 97 % 5' 11\" (1.803 m) 254 lb (115.2 kg)       Physical Exam  General :Patient is awake, alert, oriented, in no acute distress, nontoxic appearing  HEENT: Pupils are equally round and reactive to light, EOMI, conjunctivae clear. Neck: Neck is supple, full range of motion, trachea midline    Musculoskeletal: 5 out of 5 strength in all 4 extremities. No focal muscle deficits are appreciated 2+ brachial radial pulse of the left arm he has 2+ edema of both hands.   Sensation to touch is intact throughout there is no increased symptomatology with hyperextension or flexion of the wrist.  Neuro: Motor intact, sensory intact, level of consciousness is normal,   Dermatology: Skin is warm and dry  Psych: Mentation is grossly normal, cognition is grossly normal. Affect is appropriate. DIAGNOSTIC RESULTS     EKG: All EKG's are interpreted by the Emergency Department Physician who either signs or Co-signs this chart in the [address] a cardiologist.        RADIOLOGY:   Non-plain film images such as CT, Ultrasound and MRI are read by the radiologist. Plain radiographic images are visualized and preliminarily interpreted by the emergency physician with the below findings:      ? Radiologist's Report Reviewed:  No orders to display         ED BEDSIDE ULTRASOUND:   Performed by ED Physician - none    LABS:    I have reviewed and interpreted all of the currently available lab results from this visit (ifapplicable):  No results found for this visit on 07/14/22. All other labs were within normal range or not returned as of this dictation. EMERGENCY DEPARTMENT COURSE and DIFFERENTIAL DIAGNOSIS/MDM:   Vitals:    Vitals:    07/14/22 1627 07/14/22 1630 07/14/22 1631   BP: (!) 148/87     Pulse:  100    Resp:  18    Temp:  97.8 °F (36.6 °C)    TempSrc:  Oral    SpO2: 97% 97%    Weight:   254 lb (115.2 kg)   Height:   5' 11\" (1.803 m)       MEDICATIONS ADMINISTERED IN ED:  Medications - No data to display    18:52 and is stable we are still attempting to get blood work from him. Case discussed with and signed over to Dr. Jerome Tolentino at [phone]    The patient will follow-up with their PCP in 1-2 days for reevaluation. If the patient or family members have anyfurther concerns or any worsening symptoms they will return to the ED for reevaluation. CONSULTS:  None    PROCEDURES:  Procedures    CRITICAL CARE TIME    Total Critical Care time was 0 minutes, excluding separately reportable procedures.    There was a high probability of clinically significant/life threatening deterioration in the patient's condition which required my urgent intervention. FINAL IMPRESSION    No diagnosis found. DISPOSITION/PLAN   DISPOSITION        PATIENT REFERRED TO:  No follow-up provider specified. DISCHARGE MEDICATIONS:  New Prescriptions    No medications on file       Comment: Please note this report has been produced using speech recognition software and may contain errorsrelated to that system including errors in grammar, punctuation, and spelling, as well as words and phrases that may be inappropriate. If there are any questions or concerns please feel free to contact the dictating providerfor clarification.     Wilfrid Greene MD  Attending Emergency Physician              Wilfrid Greene MD  07/14/22 2164

## 2022-07-14 NOTE — ED TRIAGE NOTES
Pt arrives to ED with c/o of swelling and tingling to the left hand. Pt says both of his hands have been swelling for a year now. Pt denies injury. Pt says it feels like a dull ache and radiates up toward his elbow. Pt is right hand dominant. Pt says it feels like his hand is \"asleep\". Pt states the pain and tingling is new and began last night.

## 2022-07-15 NOTE — ED NOTES
JEREMIAS King and Naeem Ham both attempted to draw blood unsuccessfully from patient. Patient tolerated well.       Irish Marroquin RN  07/14/22 2010

## 2022-07-15 NOTE — ED NOTES
Discharge instructions provided to patient. Patient verbalizes understanding of d/c plan and follow up care. Patient ambulatory off unit to lobby at this time with no further needs noted.       Shira Taylor RN  07/14/22 2007

## 2022-07-25 ENCOUNTER — HOSPITAL ENCOUNTER (EMERGENCY)
Facility: HOSPITAL | Age: 52
Discharge: HOME | End: 2022-07-25
Attending: HOSPITALIST
Payer: COMMERCIAL

## 2022-07-25 ENCOUNTER — APPOINTMENT (OUTPATIENT)
Dept: GENERAL RADIOLOGY | Facility: HOSPITAL | Age: 52
End: 2022-07-25
Payer: COMMERCIAL

## 2022-07-25 VITALS
OXYGEN SATURATION: 96 % | SYSTOLIC BLOOD PRESSURE: 141 MMHG | TEMPERATURE: 98.1 F | RESPIRATION RATE: 18 BRPM | HEART RATE: 75 BPM | DIASTOLIC BLOOD PRESSURE: 83 MMHG

## 2022-07-25 DIAGNOSIS — M79.89 SWELLING OF LEFT MIDDLE FINGER: Primary | ICD-10-CM

## 2022-07-25 PROCEDURE — 73140 X-RAY EXAM OF FINGER(S): CPT

## 2022-07-25 PROCEDURE — 99283 EMERGENCY DEPT VISIT LOW MDM: CPT

## 2022-07-25 RX ORDER — CLINDAMYCIN HYDROCHLORIDE 300 MG/1
300 CAPSULE ORAL 3 TIMES DAILY
Qty: 30 CAPSULE | Refills: 0 | Status: SHIPPED | OUTPATIENT
Start: 2022-07-25 | End: 2022-08-04

## 2022-07-25 NOTE — ED NOTES
Pt c/o L middle finger injury while picking blue berries. Pt removed several thorns from his hand. This morning pt awoke with increased swelling and purple-bluish coloring. Digit is blanchable, CRT <1 second.       Mike Muir RN  07/25/22 4204

## 2022-07-25 NOTE — ED PROVIDER NOTES
History    Marital status: Single     Spouse name: None    Number of children: None    Years of education: None    Highest education level: None   Tobacco Use    Smoking status: Every Day     Packs/day: 1.00     Years: 8.00     Pack years: 8.00     Types: Cigarettes    Smokeless tobacco: Never   Vaping Use    Vaping Use: Never used   Substance and Sexual Activity    Alcohol use: Not Currently    Drug use: Not Currently         PHYSICAL EXAM    (up to 7 for level 4, 8 or more for level 5)     ED Triage Vitals [07/25/22 1215]   BP Temp Temp Source Pulse Resp SpO2 Height Weight   -- 98.1 °F (36.7 °C) Oral -- -- -- -- --       Physical Exam  General :Patient is awake, alert, oriented, in no acute distress, nontoxic appearing  HEENT: Pupils are equally round and reactive to light, EOMI, conjunctivae clear. Oral mucosa is moist, no exudate. Uvula is midline  Musculoskeletal: No focal muscle deficits are appreciated, patient motion is intact to all the fingers and hand except for the left middle finger just because of the swelling he cannot move it as far as he can the others but it is intact. There is no pain or tenderness noted to the finger. He definitely does have some soft tissue swelling there is no obvious felon noted to palpation of the pads of the finger. There is some slight discoloration, reddish to purple in color however cap refill is less than 3 seconds distally just like it is on the tips of his other fingers. You can palpate the area where the patient removed the thorn a couple of days ago but there does not seem to be any obvious abscess noted to the area. Again the finger is not tender to touch. The finger is slightly cooler than the other fingers but I believe that is secondary to the swelling. Neuro:  Motor intact, sensory intact, level of consciousness is normal  Dermatology: Skin is warm and dry  Psych: Mentation is grossly normal, cognition is grossly normal. Affect is and advised that he still needs to follow-up with his regular family physician. We will also give him the information for local orthopedic physician to follow-up with also. Patient does state his understanding final disposition be determined once his radiological studies been performed reviewed. Radiograph of the left middle finger read by radiology as diffuse soft tissue swelling    Patient's radiological findings were discussed with him and he does state his understanding. Patient advised no foreign body noted or fracture just soft tissue swelling noted. Again advised him to do the warm water soaks to see if that would help with his symptoms. We will also place him on the clindamycin 300 mg 3 times a day. We will give the information for local orthopedic surgeon to follow-up. Also discussed with him that if symptoms do not improve over the next several days he may need to be seen by a specialist which we would then have to transfer him on to [address] [place] [address] if he presented here. He does state his understanding of this otherwise discharged home in stable condition. The patient advised that they do need to follow-up with a regular family physician within the next 1 to 2 days for evaluation. Also advised that if symptoms worsens or new symptoms arise or should return back to emergency department for further evaluation work-up. Is this patient to be included in the SEP-1 Core Measure due to severe sepsis or septic shock? No   Exclusion criteria - the patient is NOT to be included for SEP-1 Core Measure due to: Infection is not suspected              CONSULTS:  None    PROCEDURES:  Procedures    CRITICAL CARE TIME    Total Critical Care time was 0 minutes, excluding separately reportable procedures. There was a high probability of clinically significant/life threatening deterioration in the patient's condition which required my urgent intervention. FINAL IMPRESSION      1.  Swelling of left middle finger          DISPOSITION/PLAN   DISPOSITION Decision To Discharge 07/25/2022 01:27:51 PM      PATIENT REFERRED TO:  Skylar TomasBrice [address]  [phone]    In 2 days      [facility] Emergency Department  [address] [facility] [address] [address]  [phone]    As needed, If symptoms worsen    Jhoan Alicea MD  [address] [facility], [address]  [phone]    Schedule an appointment as soon as possible for a visit in 1 week  If symptoms worsen or does not improve    DISCHARGE MEDICATIONS:  New Prescriptions    CLINDAMYCIN (CLEOCIN) 300 MG CAPSULE    Take 1 capsule by mouth in the morning and 1 capsule at noon and 1 capsule before bedtime. Do all this for 10 days. Comment: Please note this report has been produced using speech recognition software and may contain errorsrelated to that system including errors in grammar, punctuation, and spelling, as well as words and phrases that may be inappropriate. If there are any questions or concerns please feel free to contact the dictating providerfor clarification.     Steph Huitron DO  Attending Emergency Physician               Steph Huitron DO  07/25/22 7184

## 2022-08-05 DIAGNOSIS — R78.81 BACTEREMIA: ICD-10-CM

## 2022-08-05 RX ORDER — SODIUM CHLORIDE 9 MG/ML
25 INJECTION, SOLUTION INTRAVENOUS PRN
OUTPATIENT
Start: 2022-08-08

## 2022-08-05 RX ORDER — SODIUM CHLORIDE 0.9 % (FLUSH) 0.9 %
5-40 SYRINGE (ML) INJECTION PRN
OUTPATIENT
Start: 2022-08-08

## 2022-08-08 ENCOUNTER — HOSPITAL ENCOUNTER (OUTPATIENT)
Dept: NURSING | Facility: HOSPITAL | Age: 52
Setting detail: INFUSION SERIES
Discharge: HOME | End: 2022-08-08
Payer: COMMERCIAL

## 2022-08-08 VITALS
TEMPERATURE: 98.4 F | HEART RATE: 82 BPM | RESPIRATION RATE: 18 BRPM | OXYGEN SATURATION: 96 % | DIASTOLIC BLOOD PRESSURE: 83 MMHG | SYSTOLIC BLOOD PRESSURE: 132 MMHG

## 2022-08-08 LAB
A/G RATIO: 1.4 (ref 0.8–2)
ALBUMIN SERPL-MCNC: 4.3 G/DL (ref 3.4–4.8)
ALP BLD-CCNC: 95 U/L (ref 25–100)
ALT SERPL-CCNC: 32 U/L (ref 4–36)
ANION GAP SERPL CALCULATED.3IONS-SCNC: 9 MMOL/L (ref 3–16)
AST SERPL-CCNC: 26 U/L (ref 8–33)
BASOPHILS ABSOLUTE: 0 K/UL (ref 0–0.1)
BASOPHILS RELATIVE PERCENT: 0.3 %
BILIRUB SERPL-MCNC: 0.4 MG/DL (ref 0.3–1.2)
BUN BLDV-MCNC: 7 MG/DL (ref 6–20)
C-REACTIVE PROTEIN: 24.8 MG/L (ref 0–5.1)
CALCIUM SERPL-MCNC: 9.6 MG/DL (ref 8.5–10.5)
CHLORIDE BLD-SCNC: 96 MMOL/L (ref 98–107)
CO2: 32 MMOL/L (ref 20–30)
CREAT SERPL-MCNC: 0.7 MG/DL (ref 0.4–1.2)
EOSINOPHILS ABSOLUTE: 0.3 K/UL (ref 0–0.4)
EOSINOPHILS RELATIVE PERCENT: 2.8 %
GFR AFRICAN AMERICAN: >59
GFR NON-AFRICAN AMERICAN: >59
GLOBULIN: 3 G/DL
GLUCOSE BLD-MCNC: 89 MG/DL (ref 74–106)
HCT VFR BLD CALC: 44.4 % (ref 40–54)
HEMOGLOBIN: 15.2 G/DL (ref 13–18)
IMMATURE GRANULOCYTES #: 0 K/UL
IMMATURE GRANULOCYTES %: 0.2 % (ref 0–5)
LYMPHOCYTES ABSOLUTE: 1.8 K/UL (ref 1.5–4)
LYMPHOCYTES RELATIVE PERCENT: 19.8 %
MCH RBC QN AUTO: 29.3 PG (ref 27–32)
MCHC RBC AUTO-ENTMCNC: 34.2 G/DL (ref 31–35)
MCV RBC AUTO: 85.5 FL (ref 80–100)
MONOCYTES ABSOLUTE: 1 K/UL (ref 0.2–0.8)
MONOCYTES RELATIVE PERCENT: 10.7 %
NEUTROPHILS ABSOLUTE: 5.8 K/UL (ref 2–7.5)
NEUTROPHILS RELATIVE PERCENT: 66.2 %
PDW BLD-RTO: 13.2 % (ref 11–16)
PLATELET # BLD: 246 K/UL (ref 150–400)
PMV BLD AUTO: 9.7 FL (ref 6–10)
POTASSIUM SERPL-SCNC: 3.5 MMOL/L (ref 3.4–5.1)
RBC # BLD: 5.19 M/UL (ref 4.5–6)
SODIUM BLD-SCNC: 137 MMOL/L (ref 136–145)
TOTAL PROTEIN: 7.3 G/DL (ref 6.4–8.3)
WBC # BLD: 8.8 K/UL (ref 4–11)

## 2022-08-08 PROCEDURE — 85025 COMPLETE CBC W/AUTO DIFF WBC: CPT

## 2022-08-08 PROCEDURE — 86140 C-REACTIVE PROTEIN: CPT

## 2022-08-08 PROCEDURE — 36592 COLLECT BLOOD FROM PICC: CPT

## 2022-08-08 PROCEDURE — 80053 COMPREHEN METABOLIC PANEL: CPT

## 2022-08-08 PROCEDURE — 36415 COLL VENOUS BLD VENIPUNCTURE: CPT

## 2022-08-15 ENCOUNTER — HOSPITAL ENCOUNTER (OUTPATIENT)
Dept: NURSING | Facility: HOSPITAL | Age: 52
Setting detail: INFUSION SERIES
End: 2022-08-15

## 2022-08-18 ENCOUNTER — HOSPITAL ENCOUNTER (OUTPATIENT)
Dept: NURSING | Facility: HOSPITAL | Age: 52
Setting detail: INFUSION SERIES
Discharge: HOME | End: 2022-08-18
Payer: COMMERCIAL

## 2022-08-18 VITALS
TEMPERATURE: 98.2 F | RESPIRATION RATE: 20 BRPM | SYSTOLIC BLOOD PRESSURE: 123 MMHG | HEART RATE: 79 BPM | OXYGEN SATURATION: 95 % | DIASTOLIC BLOOD PRESSURE: 77 MMHG

## 2022-08-18 DIAGNOSIS — R78.81 BACTEREMIA: Primary | ICD-10-CM

## 2022-08-18 LAB
A/G RATIO: 1.3 (ref 0.8–2)
ALBUMIN SERPL-MCNC: 4.3 G/DL (ref 3.4–4.8)
ALP BLD-CCNC: 92 U/L (ref 25–100)
ALT SERPL-CCNC: 19 U/L (ref 4–36)
ANION GAP SERPL CALCULATED.3IONS-SCNC: 12 MMOL/L (ref 3–16)
AST SERPL-CCNC: 24 U/L (ref 8–33)
BASOPHILS ABSOLUTE: 0 K/UL (ref 0–0.1)
BASOPHILS RELATIVE PERCENT: 0.3 %
BILIRUB SERPL-MCNC: 0.3 MG/DL (ref 0.3–1.2)
BUN BLDV-MCNC: 7 MG/DL (ref 6–20)
C-REACTIVE PROTEIN: 17.6 MG/L (ref 0–5.1)
CALCIUM SERPL-MCNC: 9.4 MG/DL (ref 8.5–10.5)
CHLORIDE BLD-SCNC: 98 MMOL/L (ref 98–107)
CO2: 27 MMOL/L (ref 20–30)
CREAT SERPL-MCNC: 0.7 MG/DL (ref 0.4–1.2)
EOSINOPHILS ABSOLUTE: 0.5 K/UL (ref 0–0.4)
EOSINOPHILS RELATIVE PERCENT: 3.3 %
GFR AFRICAN AMERICAN: >59
GFR NON-AFRICAN AMERICAN: >59
GLOBULIN: 3.3 G/DL
GLUCOSE BLD-MCNC: 133 MG/DL (ref 74–106)
HCT VFR BLD CALC: 44.4 % (ref 40–54)
HEMOGLOBIN: 15.1 G/DL (ref 13–18)
IMMATURE GRANULOCYTES #: 0.1 K/UL
IMMATURE GRANULOCYTES %: 0.5 % (ref 0–5)
LYMPHOCYTES ABSOLUTE: 2.5 K/UL (ref 1.5–4)
LYMPHOCYTES RELATIVE PERCENT: 17.9 %
MCH RBC QN AUTO: 29.6 PG (ref 27–32)
MCHC RBC AUTO-ENTMCNC: 34 G/DL (ref 31–35)
MCV RBC AUTO: 87.1 FL (ref 80–100)
MONOCYTES ABSOLUTE: 1.1 K/UL (ref 0.2–0.8)
MONOCYTES RELATIVE PERCENT: 7.5 %
NEUTROPHILS ABSOLUTE: 9.9 K/UL (ref 2–7.5)
NEUTROPHILS RELATIVE PERCENT: 70.5 %
PDW BLD-RTO: 13.9 % (ref 11–16)
PLATELET # BLD: 246 K/UL (ref 150–400)
PMV BLD AUTO: 9.3 FL (ref 6–10)
POTASSIUM SERPL-SCNC: 3.3 MMOL/L (ref 3.4–5.1)
RBC # BLD: 5.1 M/UL (ref 4.5–6)
SODIUM BLD-SCNC: 137 MMOL/L (ref 136–145)
TOTAL PROTEIN: 7.6 G/DL (ref 6.4–8.3)
WBC # BLD: 14 K/UL (ref 4–11)

## 2022-08-18 PROCEDURE — 36415 COLL VENOUS BLD VENIPUNCTURE: CPT

## 2022-08-18 PROCEDURE — 80053 COMPREHEN METABOLIC PANEL: CPT

## 2022-08-18 PROCEDURE — 86140 C-REACTIVE PROTEIN: CPT

## 2022-08-18 PROCEDURE — 85025 COMPLETE CBC W/AUTO DIFF WBC: CPT

## 2022-08-18 PROCEDURE — 99211 OFF/OP EST MAY X REQ PHY/QHP: CPT

## 2022-08-18 RX ORDER — SODIUM CHLORIDE 0.9 % (FLUSH) 0.9 %
5-40 SYRINGE (ML) INJECTION PRN
OUTPATIENT
Start: 2022-08-18

## 2022-08-18 RX ORDER — SODIUM CHLORIDE 9 MG/ML
25 INJECTION, SOLUTION INTRAVENOUS PRN
OUTPATIENT
Start: 2022-08-18

## 2022-08-18 NOTE — PROGRESS NOTES
Pt arrived to unit for PICC removal and placed in room 13. Vitals obtained which are stable. Labs obtained per order. Dressing and PICC removed. Pressure held for 5 minutes. Site appears CDI. Dressing of 4X4 gauze and coban applied. Pt ambulatory off unit.

## 2022-08-25 ENCOUNTER — APPOINTMENT (OUTPATIENT)
Dept: GENERAL RADIOLOGY | Facility: HOSPITAL | Age: 52
End: 2022-08-25
Payer: COMMERCIAL

## 2022-08-25 ENCOUNTER — HOSPITAL ENCOUNTER (EMERGENCY)
Facility: HOSPITAL | Age: 52
Discharge: HOME | End: 2022-08-25
Attending: HOSPITALIST
Payer: COMMERCIAL

## 2022-08-25 VITALS
HEIGHT: 71 IN | TEMPERATURE: 98.2 F | BODY MASS INDEX: 35 KG/M2 | WEIGHT: 250 LBS | DIASTOLIC BLOOD PRESSURE: 77 MMHG | HEART RATE: 82 BPM | SYSTOLIC BLOOD PRESSURE: 129 MMHG | OXYGEN SATURATION: 96 % | RESPIRATION RATE: 20 BRPM

## 2022-08-25 DIAGNOSIS — S82.54XA NONDISPLACED FRACTURE OF MEDIAL MALLEOLUS OF RIGHT TIBIA, INITIAL ENCOUNTER FOR CLOSED FRACTURE: Primary | ICD-10-CM

## 2022-08-25 PROCEDURE — 73610 X-RAY EXAM OF ANKLE: CPT

## 2022-08-25 PROCEDURE — 6370000000 HC RX 637 (ALT 250 FOR IP): Performed by: HOSPITALIST

## 2022-08-25 PROCEDURE — 99283 EMERGENCY DEPT VISIT LOW MDM: CPT

## 2022-08-25 RX ORDER — OXYCODONE AND ACETAMINOPHEN 7.5; 325 MG/1; MG/1
1 TABLET ORAL EVERY 6 HOURS PRN
Qty: 12 TABLET | Refills: 0 | Status: SHIPPED | OUTPATIENT
Start: 2022-08-25 | End: 2022-08-28

## 2022-08-25 RX ORDER — OXYCODONE AND ACETAMINOPHEN 10; 325 MG/1; MG/1
1 TABLET ORAL ONCE
Status: COMPLETED | OUTPATIENT
Start: 2022-08-25 | End: 2022-08-25

## 2022-08-25 RX ORDER — OXYCODONE AND ACETAMINOPHEN 7.5; 325 MG/1; MG/1
1 TABLET ORAL EVERY 6 HOURS PRN
Qty: 112 TABLET | Refills: 0 | Status: SHIPPED | OUTPATIENT
Start: 2022-08-25 | End: 2022-08-25

## 2022-08-25 RX ADMIN — OXYCODONE HYDROCHLORIDE AND ACETAMINOPHEN 1 TABLET: 10; 325 TABLET ORAL at 16:06

## 2022-08-25 ASSESSMENT — PAIN DESCRIPTION - LOCATION
LOCATION: ANKLE
LOCATION: FOOT

## 2022-08-25 ASSESSMENT — PAIN DESCRIPTION - ORIENTATION
ORIENTATION: RIGHT
ORIENTATION: RIGHT

## 2022-08-25 ASSESSMENT — PAIN DESCRIPTION - DESCRIPTORS
DESCRIPTORS: STABBING
DESCRIPTORS: SHOOTING;SHARP

## 2022-08-25 ASSESSMENT — PAIN SCALES - GENERAL
PAINLEVEL_OUTOF10: 10
PAINLEVEL_OUTOF10: 10

## 2022-08-25 NOTE — ED PROVIDER NOTES
[facility] ENCOUNTER      Pt Name: True Chery  MRN: [id number]  YOB: 1970  Date of evaluation: 8/25/2022  Provider: Jared Richards, [address] [place] [address]       Chief Complaint   Patient presents with    Foot Injury     Right foot/ankle         HISTORY OF PRESENT ILLNESS  (Location/Symptom, Timing/Onset, Context/Setting, Quality, Duration, Modifying Factors, Severity.)   True Chery is a 50 y.o. male who presents to the emergency department right foot/ankle injury. Patient states that he fell down some steps just prior to arrival.  I was wearing tennis shoes. Patient states that the foot went in and the ankle went out. Patient had his ankle wrapped with gauze and Cobain upon arrival here. Did not complain of pain to the center aspect of the ankle itself. Patient denies any numbness tingling or weakness to the extremity. He states that it hurts extremely bad. He is continue moving and writhing around the bed. Patient rates his pain as a 10 out of 10. Denied any head injury or loss of consciousness. Patient states he already has pins and that for foot/ankle already from previous injury. Denies any other complaint at this time. Nursing notes were reviewed. REVIEW OFSYSTEMS    (2-9 systems for level 4, 10 or more for level 5)   ROS:  General:  No fevers, no chills, no weakness  Cardiovascular:  No chest pain, no palpitations  Respiratory:  No shortness of breath, no cough, no wheezing  Gastrointestinal:  No pain, no nausea, no vomiting, no diarrhea  Musculoskeletal:  No muscle pain, + right foot/ankle pain/swelling  Skin:  No rash, no easy bruising  Neurologic:  No speech problems, no headache, no extremity weakness  Psychiatric:  No anxiety  Genitourinary:  No dysuria, no hematuria    Except as noted above the remainder of the review of systems was reviewed and negative.        PAST MEDICAL HISTORY     Past Medical History:   Diagnosis Date    Chronic back pain     COPD (chronic obstructive pulmonary disease) ([facility] [address]     Hypertension     IV drug abuse ([facility] 2011    7 years clean    Neuropathy          SURGICAL HISTORY       Past Surgical History:   Procedure Laterality Date    BACK SURGERY  2006    FOOT SURGERY Right     FRACTURE SURGERY Right     ankle    SPINE SURGERY           CURRENT MEDICATIONS       Previous Medications    ALBUTEROL SULFATE HFA (PROVENTIL;VENTOLIN;PROAIR) 108 (90 BASE) MCG/ACT INHALER    Inhale 2 puffs into the lungs every 6 hours as needed for Wheezing    ASPIRIN 81 MG CHEWABLE TABLET    Take 81 mg by mouth daily    ATENOLOL (TENORMIN) 50 MG TABLET    Take 50 mg by mouth daily    BUPRENORPHINE HCL-NALOXONE HCL (SUBOXONE SL)    Place under the tongue Takes 3 films daily    CHOLECALCIFEROL (VITAMIN D3) 50 MCG (2000 UT) CAPS    Take 2,000 Units by mouth Daily    ERGOCALCIFEROL (ERGOCALCIFEROL) 65820 UNITS CAPSULE    Take 50,000 Units by mouth once a week    FLUTICASONE FUROATE-VILANTEROL (BREO ELLIPTA) 200-25 MCG/INH AEPB INHALER    Inhale 1 puff into the lungs daily    GABAPENTIN (NEURONTIN) 600 MG TABLET    Take 800 mg by mouth 3 times daily. OMEPRAZOLE PO    Take by mouth Unknown dosage       ALLERGIES     Patient has no known allergies.     FAMILY HISTORY       Family History   Problem Relation Age of Onset    High Blood Pressure Father     Diabetes Father           SOCIAL HISTORY       Social History     Socioeconomic History    Marital status: Single   Tobacco Use    Smoking status: Every Day     Packs/day: 1.00     Years: 8.00     Pack years: 8.00     Types: Cigarettes    Smokeless tobacco: Never   Vaping Use    Vaping Use: Never used   Substance and Sexual Activity    Alcohol use: Not Currently    Drug use: Not Currently         PHYSICAL EXAM    (up to 7 for level 4, 8 or more for level 5)     ED Triage Vitals   BP Temp Temp Source Heart Rate Resp SpO2 Height Weight   08/25/22 1505 08/25/22 1502 08/25/22 1502 08/25/22 1505 08/25/22 1505 08/25/22 1505 08/25/22 1504 08/25/22 1504   129/77 98.2 °F (36.8 °C) Oral 82 20 96 % 5' 11\" (1.803 m) 250 lb (113.4 kg)       Physical Exam  General :Patient is awake, alert, oriented, in no acute distress, nontoxic appearing  HEENT: Pupils are equally round and reactive to light, EOMI  Abdomen: Abdomen is soft, nontender, nondistended. There is no firm or pulsatile masses, no rebound rigidity or guarding. Musculoskeletal: No focal muscle deficits are appreciated, decreased range of motion to the right foot secondary to pain itself. Patient is more restricted to the ankle. He is able to flex and extend his toes without any difficulty. Cap refills less than 3 seconds distally. Neurovascular intact distally. Distal pulses +2. There is obvious swelling noted to the ankle mortise itself there is swelling to the medial and lateral malleolus area however there is no obvious deformity noted. Is difficult to tell because the patient had the gauze and Coban on so tight that was constricting flow and has indentions to the skin itself. Neuro: Motor intact, sensory intact, level of consciousness is normal  Dermatology: Skin is warm and dry  Psych: Mentation is grossly normal, cognition is grossly normal. Affect is appropriate. DIAGNOSTIC RESULTS     EKG: All EKG's are interpreted by the Emergency Department Physician who either signs or Co-signs this chart in the [address] a cardiologist.        RADIOLOGY:   Non-plain film images such as CT, Ultrasound and MRI are read by the radiologist. Plain radiographic images are visualized and preliminarily interpreted by the emergency physician with the below findings:      [] Radiologist's Report Reviewed:  XR ANKLE RIGHT (MIN 3 VIEWS)   Final Result   1. Nondisplaced intra-articular fracture of the base of the medial malleolus extending into the distal tibial metaphysis.    2.  Widening the medial clear space suggesting instability. 3.  Remote avulsion fractures of the medial malleolar tip. 4.  Fixation screws in the talus. ED BEDSIDE ULTRASOUND:   Performed by ED Physician - none    LABS:    I have reviewed and interpreted all of the currently available lab results from this visit (ifapplicable):  No results found for this visit on 08/25/22. All other labs were within normal range or not returned as of this dictation. EMERGENCY DEPARTMENT COURSE and DIFFERENTIAL DIAGNOSIS/MDM:   Vitals:    Vitals:    08/25/22 1502 08/25/22 1504 08/25/22 1505   BP:   129/77   Pulse:   82   Resp:   20   Temp: 98.2 °F (36.8 °C)     TempSrc: Oral     SpO2:   96%   Weight:  250 lb (113.4 kg)    Height:  5' 11\" (1.803 m)        MEDICATIONS ADMINISTERED IN ED:  Medications   oxyCODONE-acetaminophen (PERCOCET)  MG per tablet 1 tablet (has no administration in time range)       After initial evaluation examination I did have conversation with the patient about the upcoming plan, treatment possible disposition which they are agreeable to the times dictation. Patient advised that we perform radiograph of the right foot/ankle for evaluation of fracture. Patient be given Percocet 10/325 tablet for pain here. Patient's final disposition will determine once his radiological studies been performed reviewed. Otherwise patient is resting on the stretcher mild distress secondary to pain or discomfort. Radiograph of the right foot/ankle read by radiology as nondisplaced intra-articular fracture of the base of the medial malleolus extending into the distal tibial metaphysis. Widening the medial clear space suggesting instability. Remote avulsion fractures of the medial malleoli are tip. Fixation screws in the talus. Patient's radiological findings were discussed with him and he does state his understanding. Patient advised the positive findings on the radiograph for fracture.   We will place him in a tall walking boot with the crutches here since he would tolerate this better than an actual splint. Patient will be written for Percocet for pain but advised he cannot take his Suboxone while on this medication he states his understanding of this. We will also give him the information for local orthopedic surgeon to follow-up within 1week for evaluation. Otherwise patient be discharged home in stable condition. The patient advised that he does need to follow-up with his regular family physician within the next 1 to 2 days for evaluation. They are also given instructions of the symptoms worsens or new symptoms arise they should return back to emergency department for further evaluation work-up. Is this patient to be included in the SEP-1 Core Measure due to severe sepsis or septic shock? No   Exclusion criteria - the patient is NOT to be included for SEP-1 Core Measure due to: Infection is not suspected          CONSULTS:  None    PROCEDURES:  Procedures    CRITICAL CARE TIME    Total Critical Care time was 0 minutes, excluding separately reportable procedures. There was a high probability of clinically significant/life threatening deterioration in the patient's condition which required my urgent intervention. FINAL IMPRESSION      1. Nondisplaced fracture of medial malleolus of right tibia, initial encounter for closed fracture          DISPOSITION/PLAN   DISPOSITION Discharge - Pending Orders Complete 08/25/2022 03:49:57 PM      PATIENT REFERRED TO:  Mala Tomas. [address]  [phone]    In 2 days      [facility]  [facility]   [address] [facility] [address] [address] [phone]    As needed, If symptoms worsen    Maksim Love MD  [address] [facility], [address]  [phone]    Schedule an appointment as soon as possible for a visit in 1 week      DISCHARGE MEDICATIONS:  New Prescriptions    OXYCODONE-ACETAMINOPHEN (PERCOCET) 7.5-325 MG PER TABLET    Take 1 tablet by mouth every 6 hours as needed for Pain for up to 28 days. Intended supply: 28 days       Comment: Please note this report has been produced using speech recognition software and may contain errorsrelated to that system including errors in grammar, punctuation, and spelling, as well as words and phrases that may be inappropriate. If there are any questions or concerns please feel free to contact the dictating providerfor clarification.     Aishwarya Esquivel DO  Attending Emergency Physician               Aishwarya Esquivel DO  08/25/22 8546

## 2022-08-25 NOTE — ED TRIAGE NOTES
Pt presents to ED via wheelchair with c/o of right foot and ankle pain after falling at home. Pt says he turned his ankle on his porch stairs.

## 2022-08-25 NOTE — ED NOTES
AVS and Rx reviewed with patient, understanding verbalized. Patient discharged home with no further needs or concerns voiced. pcp / ortho follow up recommended.      April Chávez RN  08/25/22 6194

## 2022-10-28 ENCOUNTER — TELEPHONE (OUTPATIENT)
Dept: PHARMACY | Facility: HOSPITAL | Age: 52
End: 2022-10-28

## 2022-10-28 DIAGNOSIS — B18.2 CHRONIC HEPATITIS C WITHOUT HEPATIC COMA: Primary | ICD-10-CM

## 2022-10-28 NOTE — TELEPHONE ENCOUNTER
----- Message from Isabel Gonzalez PA-C sent at 10/28/2022 12:11 PM EDT -----  Regarding: orders for labs  I don't think he ever completed labs here to verify his Hep C being cured. He came in with his nephew today and I ordered labs and thought the lab orders printed but forgot to give them to him. Can you contact him to see where they can be faxed? Thank you!

## 2022-10-28 NOTE — TELEPHONE ENCOUNTER
Spoke with Karime (pt's SO) she is going to have Mr. Mckeon come to [facility] for labs. She states they have to use the ultrasound to find a vein to draw blood.

## 2022-12-05 RX ORDER — OMEPRAZOLE 20 MG/1
CAPSULE, DELAYED RELEASE ORAL
Qty: 60 CAPSULE | Refills: 0 | OUTPATIENT
Start: 2022-12-05

## 2022-12-20 RX ORDER — OMEPRAZOLE 20 MG/1
CAPSULE, DELAYED RELEASE ORAL
Qty: 60 CAPSULE | Refills: 0 | OUTPATIENT
Start: 2022-12-20

## 2023-02-22 ENCOUNTER — LAB (OUTPATIENT)
Dept: LAB | Facility: HOSPITAL | Age: 53
End: 2023-02-22
Payer: COMMERCIAL

## 2023-02-22 ENCOUNTER — TRANSCRIBE ORDERS (OUTPATIENT)
Dept: LAB | Facility: HOSPITAL | Age: 53
End: 2023-02-22
Payer: COMMERCIAL

## 2023-02-22 DIAGNOSIS — Z12.5 SCREENING FOR MALIGNANT NEOPLASM OF PROSTATE: ICD-10-CM

## 2023-02-22 DIAGNOSIS — E11.622 DIABETES MELLITUS WITH SKIN ULCER: Primary | ICD-10-CM

## 2023-02-22 DIAGNOSIS — E55.9 VITAMIN D DEFICIENCY: ICD-10-CM

## 2023-02-22 DIAGNOSIS — E87.6 HYPOKALEMIA: ICD-10-CM

## 2023-02-22 DIAGNOSIS — B18.2 CHRONIC HEPATITIS C WITHOUT HEPATIC COMA: ICD-10-CM

## 2023-02-22 DIAGNOSIS — E53.8 VITAMIN B12 DEFICIENCY: ICD-10-CM

## 2023-02-22 DIAGNOSIS — E78.2 MIXED HYPERLIPIDEMIA: ICD-10-CM

## 2023-02-22 DIAGNOSIS — E11.622 DIABETES MELLITUS WITH SKIN ULCER: ICD-10-CM

## 2023-02-22 DIAGNOSIS — L98.499 DIABETES MELLITUS WITH SKIN ULCER: ICD-10-CM

## 2023-02-22 DIAGNOSIS — E87.6 HYPOKALEMIA: Primary | ICD-10-CM

## 2023-02-22 DIAGNOSIS — L98.499 DIABETES MELLITUS WITH SKIN ULCER: Primary | ICD-10-CM

## 2023-02-22 LAB
25(OH)D3 SERPL-MCNC: 46.7 NG/ML (ref 30–100)
ALBUMIN SERPL-MCNC: 4.1 G/DL (ref 3.5–5.2)
ALBUMIN/GLOB SERPL: 1.1 G/DL
ALP SERPL-CCNC: 128 U/L (ref 39–117)
ALT SERPL W P-5'-P-CCNC: 19 U/L (ref 1–41)
ANION GAP SERPL CALCULATED.3IONS-SCNC: 13.3 MMOL/L (ref 5–15)
AST SERPL-CCNC: 24 U/L (ref 1–40)
BASOPHILS # BLD AUTO: 0.07 10*3/MM3 (ref 0–0.2)
BASOPHILS NFR BLD AUTO: 0.5 % (ref 0–1.5)
BILIRUB SERPL-MCNC: 0.3 MG/DL (ref 0–1.2)
BUN SERPL-MCNC: 9 MG/DL (ref 6–20)
BUN/CREAT SERPL: 12.5 (ref 7–25)
CALCIUM SPEC-SCNC: 9.5 MG/DL (ref 8.6–10.5)
CHLORIDE SERPL-SCNC: 95 MMOL/L (ref 98–107)
CHOLEST SERPL-MCNC: 227 MG/DL (ref 0–200)
CO2 SERPL-SCNC: 26.7 MMOL/L (ref 22–29)
CREAT SERPL-MCNC: 0.72 MG/DL (ref 0.76–1.27)
DEPRECATED RDW RBC AUTO: 39.6 FL (ref 37–54)
EGFRCR SERPLBLD CKD-EPI 2021: 110.6 ML/MIN/1.73
EOSINOPHIL # BLD AUTO: 0.41 10*3/MM3 (ref 0–0.4)
EOSINOPHIL NFR BLD AUTO: 3 % (ref 0.3–6.2)
ERYTHROCYTE [DISTWIDTH] IN BLOOD BY AUTOMATED COUNT: 13.1 % (ref 12.3–15.4)
GLOBULIN UR ELPH-MCNC: 3.7 GM/DL
GLUCOSE SERPL-MCNC: 100 MG/DL (ref 65–99)
HAV IGM SERPL QL IA: ABNORMAL
HBA1C MFR BLD: 6 % (ref 4.8–5.6)
HBV CORE IGM SERPL QL IA: ABNORMAL
HBV SURFACE AG SERPL QL IA: ABNORMAL
HCT VFR BLD AUTO: 47.8 % (ref 37.5–51)
HCV AB SER DONR QL: REACTIVE
HDLC SERPL-MCNC: 32 MG/DL (ref 40–60)
HGB BLD-MCNC: 16.9 G/DL (ref 13–17.7)
IMM GRANULOCYTES # BLD AUTO: 0.08 10*3/MM3 (ref 0–0.05)
IMM GRANULOCYTES NFR BLD AUTO: 0.6 % (ref 0–0.5)
LDLC SERPL CALC-MCNC: 181 MG/DL (ref 0–100)
LDLC/HDLC SERPL: 5.59 {RATIO}
LYMPHOCYTES # BLD AUTO: 3.51 10*3/MM3 (ref 0.7–3.1)
LYMPHOCYTES NFR BLD AUTO: 25.3 % (ref 19.6–45.3)
MAGNESIUM SERPL-MCNC: 1.8 MG/DL (ref 1.6–2.6)
MCH RBC QN AUTO: 29.4 PG (ref 26.6–33)
MCHC RBC AUTO-ENTMCNC: 35.4 G/DL (ref 31.5–35.7)
MCV RBC AUTO: 83.3 FL (ref 79–97)
MONOCYTES # BLD AUTO: 1.16 10*3/MM3 (ref 0.1–0.9)
MONOCYTES NFR BLD AUTO: 8.4 % (ref 5–12)
NEUTROPHILS NFR BLD AUTO: 62.2 % (ref 42.7–76)
NEUTROPHILS NFR BLD AUTO: 8.64 10*3/MM3 (ref 1.7–7)
NRBC BLD AUTO-RTO: 0 /100 WBC (ref 0–0.2)
PLATELET # BLD AUTO: 299 10*3/MM3 (ref 140–450)
PMV BLD AUTO: 10.3 FL (ref 6–12)
POTASSIUM SERPL-SCNC: 3.1 MMOL/L (ref 3.5–5.2)
PROT SERPL-MCNC: 7.8 G/DL (ref 6–8.5)
RBC # BLD AUTO: 5.74 10*6/MM3 (ref 4.14–5.8)
SODIUM SERPL-SCNC: 135 MMOL/L (ref 136–145)
TRIGL SERPL-MCNC: 80 MG/DL (ref 0–150)
TSH SERPL DL<=0.05 MIU/L-ACNC: 2.07 UIU/ML (ref 0.27–4.2)
VIT B12 BLD-MCNC: 1044 PG/ML (ref 211–946)
VLDLC SERPL-MCNC: 14 MG/DL (ref 5–40)
WBC NRBC COR # BLD: 13.87 10*3/MM3 (ref 3.4–10.8)

## 2023-02-22 PROCEDURE — 85025 COMPLETE CBC W/AUTO DIFF WBC: CPT

## 2023-02-22 PROCEDURE — 82306 VITAMIN D 25 HYDROXY: CPT

## 2023-02-22 PROCEDURE — 80053 COMPREHEN METABOLIC PANEL: CPT

## 2023-02-22 PROCEDURE — 83036 HEMOGLOBIN GLYCOSYLATED A1C: CPT

## 2023-02-22 PROCEDURE — 83735 ASSAY OF MAGNESIUM: CPT

## 2023-02-22 PROCEDURE — 87522 HEPATITIS C REVRS TRNSCRPJ: CPT

## 2023-02-22 PROCEDURE — 80061 LIPID PANEL: CPT

## 2023-02-22 PROCEDURE — 84443 ASSAY THYROID STIM HORMONE: CPT

## 2023-02-22 PROCEDURE — 80074 ACUTE HEPATITIS PANEL: CPT

## 2023-02-22 PROCEDURE — 82607 VITAMIN B-12: CPT

## 2023-02-22 PROCEDURE — 84066 ASSAY PROSTATE PHOSPHATASE: CPT

## 2023-02-22 PROCEDURE — 36415 COLL VENOUS BLD VENIPUNCTURE: CPT

## 2023-02-22 RX ORDER — POTASSIUM CHLORIDE 750 MG/1
10 TABLET, FILM COATED, EXTENDED RELEASE ORAL 2 TIMES DAILY
Qty: 14 TABLET | Refills: 0 | Status: SHIPPED | OUTPATIENT
Start: 2023-02-22

## 2023-02-24 LAB
HCV GENTYP SERPL NAA+PROBE: NORMAL
HCV RNA SERPL NAA+PROBE-ACNC: NORMAL IU/ML
HCV RNA SERPL NAA+PROBE-LOG IU: NORMAL LOG10 IU/ML
LABORATORY COMMENT REPORT: NORMAL
PACP SER-MCNC: 1.5 NG/ML (ref 0–3.5)

## 2023-03-10 ENCOUNTER — TELEPHONE (OUTPATIENT)
Dept: GASTROENTEROLOGY | Facility: CLINIC | Age: 53
End: 2023-03-10
Payer: COMMERCIAL

## 2023-03-10 DIAGNOSIS — B18.2 CHRONIC HEPATITIS C WITHOUT HEPATIC COMA: Primary | ICD-10-CM

## 2023-03-10 NOTE — TELEPHONE ENCOUNTER
Had recent labs for 3 mo s/p completion of Hepatitis C treatment with [facility] (finished 6/2021 but never followed up) show HCV RNA not detected. He has been cured with [facility]. Please let him know. Thanks.

## 2023-03-11 ENCOUNTER — APPOINTMENT (OUTPATIENT)
Dept: GENERAL RADIOLOGY | Facility: HOSPITAL | Age: 53
End: 2023-03-11
Payer: COMMERCIAL

## 2023-03-11 ENCOUNTER — HOSPITAL ENCOUNTER (EMERGENCY)
Facility: HOSPITAL | Age: 53
Discharge: HOME | End: 2023-03-11
Attending: HOSPITALIST
Payer: COMMERCIAL

## 2023-03-11 VITALS
WEIGHT: 257 LBS | HEART RATE: 75 BPM | SYSTOLIC BLOOD PRESSURE: 120 MMHG | HEIGHT: 71 IN | TEMPERATURE: 98.1 F | BODY MASS INDEX: 35.98 KG/M2 | DIASTOLIC BLOOD PRESSURE: 80 MMHG | OXYGEN SATURATION: 93 %

## 2023-03-11 DIAGNOSIS — L03.011 CELLULITIS OF RIGHT THUMB: Primary | ICD-10-CM

## 2023-03-11 PROCEDURE — 73130 X-RAY EXAM OF HAND: CPT

## 2023-03-11 PROCEDURE — 6370000000 HC RX 637 (ALT 250 FOR IP): Performed by: HOSPITALIST

## 2023-03-11 PROCEDURE — 99283 EMERGENCY DEPT VISIT LOW MDM: CPT

## 2023-03-11 RX ORDER — CLINDAMYCIN HYDROCHLORIDE 300 MG/1
300 CAPSULE ORAL 3 TIMES DAILY
Qty: 30 CAPSULE | Refills: 0 | Status: SHIPPED | OUTPATIENT
Start: 2023-03-11 | End: 2023-03-21

## 2023-03-11 RX ORDER — OXYCODONE HYDROCHLORIDE AND ACETAMINOPHEN 5; 325 MG/1; MG/1
2 TABLET ORAL ONCE
Status: COMPLETED | OUTPATIENT
Start: 2023-03-11 | End: 2023-03-11

## 2023-03-11 RX ADMIN — OXYCODONE AND ACETAMINOPHEN 2 TABLET: 5; 325 TABLET ORAL at 12:30

## 2023-03-11 ASSESSMENT — PAIN SCALES - GENERAL
PAINLEVEL_OUTOF10: 9
PAINLEVEL_OUTOF10: 9

## 2023-03-11 ASSESSMENT — PAIN DESCRIPTION - LOCATION
LOCATION: FINGER (COMMENT WHICH ONE)
LOCATION: FINGER (COMMENT WHICH ONE)

## 2023-03-11 ASSESSMENT — PAIN DESCRIPTION - FREQUENCY: FREQUENCY: CONTINUOUS

## 2023-03-11 ASSESSMENT — PAIN DESCRIPTION - DESCRIPTORS
DESCRIPTORS: THROBBING
DESCRIPTORS: THROBBING

## 2023-03-11 ASSESSMENT — PAIN DESCRIPTION - ONSET: ONSET: GRADUAL

## 2023-03-11 ASSESSMENT — PAIN - FUNCTIONAL ASSESSMENT: PAIN_FUNCTIONAL_ASSESSMENT: 0-10

## 2023-03-11 ASSESSMENT — PAIN DESCRIPTION - ORIENTATION
ORIENTATION: RIGHT
ORIENTATION: RIGHT

## 2023-03-11 ASSESSMENT — LIFESTYLE VARIABLES: HOW OFTEN DO YOU HAVE A DRINK CONTAINING ALCOHOL: NEVER

## 2023-03-11 NOTE — ED NOTES
[employer] splint in place to patients right wrist/thumb at this time, patient states is improved, splint instruction reviewed and patient verbalized at this time. No other questions or concerns.      Alex Suarez RN  03/11/23 8584

## 2023-03-11 NOTE — ED NOTES
Dc instructions given to patient at this time, patient with no other questions or concerns.      Andrzej Lopez RN  03/11/23 4251

## 2023-03-11 NOTE — ED PROVIDER NOTES
[facility] ENCOUNTER      Pt Name: Zenaida Link  MRN: [id number]  YOB: 1970  Date of evaluation: 3/11/2023  Provider: Trish Lopez, [address] [place] [address]       Chief Complaint   Patient presents with    Finger Pain     Swelling and pain of right thumb             HISTORY OF PRESENT ILLNESS  (Location/Symptom, Timing/Onset, Context/Setting, Quality, Duration, Modifying Factors, Severity.)   Zenaida Link is a 48 y.o. male who presents to the emergency department right thumb pain/swelling. Patient states the symptoms started about 3 days ago. He states he has been soaking it in a warm/hot Epsom salt water and putting in cool water afterwards. Patient denies any drainage from the area. He denies any trauma or injury that he is aware of to the thumb. States he is just been doing his regular daily activity has not been doing extra or superfluous movement or activity with the hand itself. He is right-hand dominant. Patient asked where it hurts the most and he points to the base of the thumb. He states that is where he has most of the tenderness which she does rated his pain as a 9 out of 10. Just describes as a constant aching discomfort. 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  He states he did have a whole bottle of amoxicillin left over that he just started taking within the last 24 hours in case it could possibly be this again. He states he missed his last follow-up appointment with infectious disease doctor in [facility]. He denies any fevers or chills. Denies any numbness tingling weakness of the extremity of ordinary again denies any trauma or injury to the area. Denies any other complaint at this time. Patient does have history of chronic back pain, COPD, hypertension, IV drug abuse but states has been clean for 7 years along with neuropathy.   Patient also has type 2 diabetes which he takes oral medication and Ozempic. Nursing notes were reviewed. REVIEW OFSYSTEMS    (2-9 systems for level 4, 10 or more for level 5)   ROS:  General:  No fevers, no chills, no weakness  Cardiovascular:  No chest pain, no palpitations  Respiratory:  No shortness of breath, no cough, no wheezing  Gastrointestinal:  No pain, no nausea, no vomiting, no diarrhea  Musculoskeletal:  No muscle pain, + right thumb pain/swelling/redness  Skin:  No rash, no easy bruising  Neurologic:  No speech problems, no headache, no extremity weakness  Psychiatric:  No anxiety  Genitourinary:  No dysuria, no hematuria    Except as noted above the remainder of the review of systems was reviewed and negative. PAST MEDICAL HISTORY     Past Medical History:   Diagnosis Date    Chronic back pain     COPD (chronic obstructive pulmonary disease) ([facility] [address]     Hypertension     [facility]    7 years clean    Neuropathy          SURGICAL HISTORY       Past Surgical History:   Procedure Laterality Date    BACK SURGERY  2006    FOOT SURGERY Right     FRACTURE SURGERY Right     ankle    SPINE SURGERY           CURRENT MEDICATIONS       Previous Medications    ALBUTEROL SULFATE HFA (PROVENTIL;VENTOLIN;PROAIR) 108 (90 BASE) MCG/ACT INHALER    Inhale 2 puffs into the lungs every 6 hours as needed for Wheezing    ASPIRIN 81 MG CHEWABLE TABLET    Take 81 mg by mouth daily    ATENOLOL (TENORMIN) 50 MG TABLET    Take 50 mg by mouth daily    BUPRENORPHINE HCL-NALOXONE HCL (SUBOXONE SL)    Place under the tongue Takes 3 films daily    CHOLECALCIFEROL (VITAMIN D3) 50 MCG (2000 UT) CAPS    Take 2,000 Units by mouth Daily    ERGOCALCIFEROL (ERGOCALCIFEROL) 45321 UNITS CAPSULE    Take 50,000 Units by mouth once a week    FLUTICASONE FUROATE-VILANTEROL (BREO ELLIPTA) 200-25 MCG/INH AEPB INHALER    Inhale 1 puff into the lungs daily    GABAPENTIN (NEURONTIN) 600 MG TABLET    Take 800 mg by mouth 3 times daily.      OMEPRAZOLE PO Take by mouth Unknown dosage       ALLERGIES     Patient has no known allergies. FAMILY HISTORY       Family History   Problem Relation Age of Onset    High Blood Pressure Father     Diabetes Father           SOCIAL HISTORY       Social History     Socioeconomic History    Marital status: Single     Spouse name: None    Number of children: None    Years of education: None    Highest education level: None   Tobacco Use    Smoking status: Every Day     Packs/day: 1.00     Years: 8.00     Pack years: 8.00     Types: Cigarettes    Smokeless tobacco: Current     Types: Chew, Snuff   Vaping Use    Vaping Use: Never used   Substance and Sexual Activity    Alcohol use: Not Currently    Drug use: Not Currently         PHYSICAL EXAM    (up to 7 for level 4, 8 or more for level 5)     ED Triage Vitals   BP Temp Temp src Pulse Resp SpO2 Height Weight   -- -- -- -- -- -- -- --       Physical Exam  General :Patient is awake, alert, oriented, in no acute distress, nontoxic appearing  HEENT: Pupils are equally round and reactive to light, EOMI, conjunctivae clear. Oral mucosa is moist, no exudate. Uvula is midline  Cardiac: Heart regular rate, rhythm, no murmurs, rubs, or gallops  Lungs: Lungs are clear to auscultation, there is no wheezing, rhonchi, or rales. There is no use of accessory muscles. Musculoskeletal: 5 out of 5 strength in all 4 extremities. No focal muscle deficits are appreciated, decreased range of motion to the right thumb secondary to pain and swelling. However he can flex and extend against resistance. There is no obvious abscess noted or concern for fluid collection. There is just diffuse cellulitic changes/erythema to the thumb. Cap refills less than 3 seconds distally. Neurovascular intact distally. Neuro:  Motor intact, sensory intact, level of consciousness is normal  Dermatology: Skin is warm and dry  Psych: Mentation is grossly normal, cognition is grossly normal. Affect is appropriate. DIAGNOSTIC RESULTS     EKG: All EKG's are interpreted by the Emergency Department Physician who either signs or Co-signs this chart in the [address] a cardiologist.        RADIOLOGY:   Non-plain film images such as CT, Ultrasound and MRI are read by the radiologist. Plain radiographic images are visualized and preliminarily interpreted by the emergency physician with the below findings:      [] Radiologist's Report Reviewed:  XR HAND RIGHT (MIN 3 VIEWS)   Final Result   1. Extensive soft tissue swelling especially along the dorsum of the hand and wrist. Correlate for cellulitis or infection. No acute osseous abnormality. ED BEDSIDE ULTRASOUND:   Performed by ED Physician - none    LABS:    I have reviewed and interpreted all of the currently available lab results from this visit (ifapplicable):  No results found for this visit on 03/11/23. All other labs were within normal range or not returned as of this dictation. EMERGENCY DEPARTMENT COURSE and DIFFERENTIAL DIAGNOSIS/MDM:   Vitals:    Vitals:    03/11/23 1215 03/11/23 1223   BP: 120/80    Pulse: 75    Temp: 98.1 °F (36.7 °C)    SpO2: 95% 93%   Weight: 257 lb (116.6 kg)    Height: 5' 11\" (1.803 m)        MEDICATIONS ADMINISTERED IN ED:  Medications   oxyCODONE-acetaminophen (PERCOCET) 5-325 MG per tablet 2 tablet (2 tablets Oral Given 3/11/23 1230)       Initial evaluation examination I did have conversation with the patient about upcoming plan, treatment possible disposition which they are agreeable to the consultation. Advised that we will go ahead and perform radiograph of the right thumb to make sure there is no underlying foreign body which could possibly be causing the patient's symptoms even though he does not recall any trauma or injury. I were also performing the x-ray to make sure there is no underlying osseous abnormality. Patient be given Percocet 5/325 2 tablets here for pain.   Advised that if there is no acute findings on the radiograph than Tylenol or Motrin be all that we could write for discomfort and he does state his understanding of this. Patient is on Suboxone. He states he has not had his dose today. Differential diagnosis is but is not all-inclusive could be foreign body, cellulitis, arthritic flare or fracture. Patient is agreeable to this plan. He is resting comfort on stretcher no acute distress nontoxic-appearing. Final disposition return once his radiological studies been performed reviewed. Wet read for radiograph the right hand performed by me as no acute osseous fracture noted. No radiopaque foreign body noted. Soft tissue swelling. Radiograph of the right hand read by radiology as extensive soft tissue swelling especially along the dorsum of the hand and wrist.  Correlate for cellulitis or infection. No acute osseous abnormality. Patient's radiological findings were discussed with him and he does state his understanding patient advised the radiographic findings. His findings are more consistent with a cellulitis. There is no drainable pocket or area of fluctuance on palpation of the thumb. Advised to continue with the amoxicillin which she started at home since he does have a history of actinomyces infection of a finger in the past however I will go ahead and place him on clindamycin 300 mg 1 tablet 3 times a day for 10 days for antibiotic coverage also. Advised he can continue to soak the appendage if that helps with his symptoms. Otherwise Tylenol Motrin for pain control. Advised it will take at least 3 to 5 days of the antibiotic before him no significant change in his symptoms he does state his understanding of this otherwise discharged home in stable condition. Patient requesting a splint for his thumb to see if that would help with his discomfort. We will place him in a thumb spica splint prior to discharge.       The patient advised that he does need to follow-up with his regular family physician within the next 1 to 2 days for evaluation. He was also given instructions of if symptoms worsens or new symptoms arise he should return back to the emergency department for further evaluation work-up. Is this patient to be included in the SEP-1 Core Measure due to severe sepsis or septic shock? No   Exclusion criteria - the patient is NOT to be included for SEP-1 Core Measure due to:  2+ SIRS criteria are not met          CONSULTS:  None    PROCEDURES:  Procedures    CRITICAL CARE TIME    Total Critical Care time was 0 minutes, excluding separately reportable procedures. There was a high probability of clinically significant/life threatening deterioration in the patient's condition which required my urgent intervention. FINAL IMPRESSION      1. Cellulitis of right thumb          DISPOSITION/PLAN   DISPOSITION Discharge - Pending Orders Complete 03/11/2023 01:08:12 PM      PATIENT REFERRED TO:  Skylar Liu [address]  [phone]    In 2 days      [facility] Emergency Department  [facility] [address] [facility]  [phone]    As needed, If symptoms worsen    DISCHARGE MEDICATIONS:  New Prescriptions    CLINDAMYCIN (CLEOCIN) 300 MG CAPSULE    Take 1 capsule by mouth 3 times daily for 10 days       Comment: Please note this report has been produced using speech recognition software and may contain errorsrelated to that system including errors in grammar, punctuation, and spelling, as well as words and phrases that may be inappropriate. If there are any questions or concerns please feel free to contact the dictating providerfor clarification.     Steph Huitron, DO  Attending Emergency Physician               Rosalio Burgos, DO  03/11/23 Daniel 32, DO  03/11/23 9968

## 2023-03-17 NOTE — TELEPHONE ENCOUNTER
Spoke with patient and reviewed lab results.     Pt verbalized understanding and is happy with results.

## 2023-03-21 DIAGNOSIS — E87.6 HYPOKALEMIA: ICD-10-CM

## 2023-03-21 RX ORDER — POTASSIUM CHLORIDE 750 MG/1
10 TABLET, FILM COATED, EXTENDED RELEASE ORAL 2 TIMES DAILY
Qty: 14 TABLET | Refills: 0 | OUTPATIENT
Start: 2023-03-21

## 2023-05-24 ENCOUNTER — TRANSCRIBE ORDERS (OUTPATIENT)
Dept: LAB | Facility: HOSPITAL | Age: 53
End: 2023-05-24
Payer: COMMERCIAL

## 2023-05-24 ENCOUNTER — LAB (OUTPATIENT)
Dept: LAB | Facility: HOSPITAL | Age: 53
End: 2023-05-24
Payer: COMMERCIAL

## 2023-05-24 DIAGNOSIS — B19.20 HEPATITIS C VIRUS INFECTION WITHOUT HEPATIC COMA, UNSPECIFIED CHRONICITY: ICD-10-CM

## 2023-05-24 DIAGNOSIS — E87.6 HYPOKALEMIA: ICD-10-CM

## 2023-05-24 DIAGNOSIS — E87.1 HYPONATREMIA: Primary | ICD-10-CM

## 2023-05-24 DIAGNOSIS — E87.1 HYPONATREMIA: ICD-10-CM

## 2023-05-24 LAB
ALBUMIN SERPL-MCNC: 4.1 G/DL (ref 3.5–5.2)
ALBUMIN/GLOB SERPL: 1.4 G/DL
ALP SERPL-CCNC: 97 U/L (ref 39–117)
ALT SERPL W P-5'-P-CCNC: 20 U/L (ref 1–41)
ANION GAP SERPL CALCULATED.3IONS-SCNC: 15 MMOL/L (ref 5–15)
AST SERPL-CCNC: 24 U/L (ref 1–40)
BILIRUB SERPL-MCNC: 0.3 MG/DL (ref 0–1.2)
BUN SERPL-MCNC: 9 MG/DL (ref 6–20)
BUN/CREAT SERPL: 11.3 (ref 7–25)
CALCIUM SPEC-SCNC: 9.1 MG/DL (ref 8.6–10.5)
CHLORIDE SERPL-SCNC: 96 MMOL/L (ref 98–107)
CO2 SERPL-SCNC: 26 MMOL/L (ref 22–29)
CREAT SERPL-MCNC: 0.8 MG/DL (ref 0.76–1.27)
EGFRCR SERPLBLD CKD-EPI 2021: 107.1 ML/MIN/1.73
GLOBULIN UR ELPH-MCNC: 3 GM/DL
GLUCOSE SERPL-MCNC: 120 MG/DL (ref 65–99)
POTASSIUM SERPL-SCNC: 3.1 MMOL/L (ref 3.5–5.2)
PROT SERPL-MCNC: 7.1 G/DL (ref 6–8.5)
SODIUM SERPL-SCNC: 137 MMOL/L (ref 136–145)

## 2023-05-24 PROCEDURE — 80053 COMPREHEN METABOLIC PANEL: CPT

## 2023-05-24 PROCEDURE — 36415 COLL VENOUS BLD VENIPUNCTURE: CPT

## 2023-05-24 PROCEDURE — 86803 HEPATITIS C AB TEST: CPT

## 2023-05-24 PROCEDURE — 87522 HEPATITIS C REVRS TRNSCRPJ: CPT

## 2023-05-27 LAB
DIAGNOSTIC IMP SPEC-IMP: NORMAL
HCV IGG SERPL QL IA: REACTIVE
HCV RNA SERPL NAA+PROBE-ACNC: NORMAL IU/ML
REF LAB TEST REF RANGE: NORMAL

## 2023-09-14 ENCOUNTER — APPOINTMENT (OUTPATIENT)
Dept: GENERAL RADIOLOGY | Facility: HOSPITAL | Age: 53
End: 2023-09-14
Attending: HOSPITALIST
Payer: COMMERCIAL

## 2023-09-14 ENCOUNTER — HOSPITAL ENCOUNTER (EMERGENCY)
Facility: HOSPITAL | Age: 53
Discharge: HOME | End: 2023-09-14
Attending: HOSPITALIST
Payer: COMMERCIAL

## 2023-09-14 VITALS
RESPIRATION RATE: 16 BRPM | HEIGHT: 71 IN | BODY MASS INDEX: 34.44 KG/M2 | OXYGEN SATURATION: 96 % | SYSTOLIC BLOOD PRESSURE: 115 MMHG | TEMPERATURE: 98.2 F | HEART RATE: 81 BPM | DIASTOLIC BLOOD PRESSURE: 86 MMHG | WEIGHT: 246 LBS

## 2023-09-14 DIAGNOSIS — L03.012 CELLULITIS OF LEFT THUMB: Primary | ICD-10-CM

## 2023-09-14 PROCEDURE — 99283 EMERGENCY DEPT VISIT LOW MDM: CPT

## 2023-09-14 PROCEDURE — 73130 X-RAY EXAM OF HAND: CPT

## 2023-09-14 RX ORDER — CLINDAMYCIN HYDROCHLORIDE 300 MG/1
300 CAPSULE ORAL 3 TIMES DAILY
Qty: 30 CAPSULE | Refills: 0 | Status: SHIPPED | OUTPATIENT
Start: 2023-09-14 | End: 2023-09-24

## 2023-09-14 ASSESSMENT — PAIN - FUNCTIONAL ASSESSMENT: PAIN_FUNCTIONAL_ASSESSMENT: 0-10

## 2023-09-14 ASSESSMENT — PAIN DESCRIPTION - ORIENTATION: ORIENTATION: LEFT

## 2023-09-14 ASSESSMENT — LIFESTYLE VARIABLES
HOW OFTEN DO YOU HAVE A DRINK CONTAINING ALCOHOL: NEVER
HOW MANY STANDARD DRINKS CONTAINING ALCOHOL DO YOU HAVE ON A TYPICAL DAY: PATIENT DOES NOT DRINK

## 2023-09-14 ASSESSMENT — PAIN DESCRIPTION - FREQUENCY: FREQUENCY: INTERMITTENT

## 2023-09-14 ASSESSMENT — PAIN DESCRIPTION - LOCATION: LOCATION: FINGER (COMMENT WHICH ONE)

## 2023-09-14 ASSESSMENT — PAIN SCALES - GENERAL: PAINLEVEL_OUTOF10: 7

## 2023-09-14 NOTE — ED PROVIDER NOTES
[facility] ENCOUNTER        Pt Name: Darren Candelaria  MRN: [id number]  [address] [facility] [place] 1970  Date of evaluation: 9/14/2023  Provider: Leslye Dave DO  PCP: Juani Marcelo  Note Started: 12:22 PM EDT 9/14/23    CHIEF COMPLAINT       Chief Complaint   Patient presents with    Hand Injury     Smashed left hand in door 1 week ago       HISTORY OF PRESENT ILLNESS: 1 or more Elements     History from : Patient    Limitations to history : None    Darren Candelaria is a 48 y.o. male who presents urgency department for left thumb/hand injury. Patient states that he smashed his thumb in a car door about approximately 1 week ago. He states a blood blister come up on the side of his thumb which she opened with a needle her pin at that time and drained it. He states since then he has a hard sort of callused area to that area but he started have some redness and swelling to the thumb itself. He denies any pain with movement. He states he was just concerned because the area where he opened with a needle is now become slightly hard and he wanted it evaluated. He does have a history of infections before in the past he has history of actinomyces infection. Patient states he had some amoxicillin left over so has been taking that for the last couple of days but was just concerned and come to emergency department for evaluation. He states the hand and thumb really does not hurt him is much as he is concerned about the mild skin change being slightly redder than normal.  Any other complaint or injury at this time. Past medical history significant chronic back pain, COPD, hypertension, IV drug abuse, neuropathy. Nursing Notes were all reviewed and agreed with or any disagreements were addressed in the HPI.     REVIEW OF SYSTEMS :      Review of Systems    Review of systems reviewed and negative except as in HPI/MDM    PAST MEDICAL HISTORY     Past Medical History: on the side of his thumb which she opened with a needle her pin at that time and drained it. He states since then he has a hard sort of callused area to that area but he started have some redness and swelling to the thumb itself. He denies any pain with movement. He states he was just concerned because the area where he opened with a needle is now become slightly hard and he wanted it evaluated. He does have a history of infections before in the past he has history of actinomyces infection. Patient states he had some amoxicillin left over so has been taking that for the last couple of days but was just concerned and come to emergency department for evaluation. He states the hand and thumb really does not hurt him is much as he is concerned about the mild skin change being slightly redder than normal.  Any other complaint or injury at this time. Past medical history significant chronic back pain, COPD, hypertension, IV drug abuse, neuropathy. After initial evaluation examination I did have conversation with the patient about upcoming plan, treatment possible disposition which they are agreeable to the times dictation. Advised that we will perform radiograph of the left thumb to make sure there is no acute osseous finding however his range of motion is intact. He is more concerned about the area that he opened and drained approximately 1 week ago to the side of the thumb because he had some increased redness to this area. Otherwise patient advised that we would still cover him with antibiotics just for the redness and swelling to the thumb itself. He has been taking amoxicillin at home that he had leftover from previous infection. Advised we pry cover him with clindamycin 300 mg 1 tablet 3 times a day for 10 days he is agreeable to this plan. Advised use Tylenol Motrin for pain control. Final disposition be determined once his radiological studies been performed reviewed.     Radiograph of the left

## 2023-09-14 NOTE — ED TRIAGE NOTES
Patient presents to the ED with c/o left thumb pain after closing it in a car door approx. 1 week ago. Patient states it developed a blood blister so he stuck a needle in it to drain it. Patient reports he has been taking left offer amoxicillin 500mg 3 times a day.

## 2023-09-18 NOTE — CARE COORDINATION
Spoke with pt and he reports that things are not much changed with his thumb. It's still swollen and red \"as a varela's butt\" and sore. HE says he has spoken to his PCP and they have advised him to go in if he starts to see any red streaking. He will all PCP tomorrow if it doesn't show any improvement.

## 2024-11-13 ENCOUNTER — TRANSCRIBE ORDERS (OUTPATIENT)
Dept: LAB | Facility: HOSPITAL | Age: 54
End: 2024-11-13
Payer: COMMERCIAL

## 2024-11-13 DIAGNOSIS — E55.9 VITAMIN D DEFICIENCY: ICD-10-CM

## 2024-11-13 DIAGNOSIS — F11.99 OPIOID USE WITH OPIOID-INDUCED DISORDER: ICD-10-CM

## 2024-11-13 DIAGNOSIS — G57.93 NEUROPATHIC PAIN, LEG, BILATERAL: ICD-10-CM

## 2024-11-13 DIAGNOSIS — I10 ESSENTIAL HYPERTENSION, MALIGNANT: Primary | ICD-10-CM

## 2024-11-13 DIAGNOSIS — E11.622 TYPE 2 DIABETES MELLITUS WITH OTHER SKIN ULCER (CODE): ICD-10-CM

## 2024-11-13 DIAGNOSIS — R53.83 FATIGUE, UNSPECIFIED TYPE: ICD-10-CM

## 2024-11-13 DIAGNOSIS — J43.9 PULMONARY EMPHYSEMA, UNSPECIFIED EMPHYSEMA TYPE: ICD-10-CM

## 2024-11-13 DIAGNOSIS — E53.8 VITAMIN B 12 DEFICIENCY: ICD-10-CM

## 2024-11-13 DIAGNOSIS — J30.2 SEASONAL ALLERGIES: ICD-10-CM

## 2024-11-13 DIAGNOSIS — Z12.11 COLON CANCER SCREENING: ICD-10-CM

## 2024-11-13 DIAGNOSIS — F32.9 MAJOR DEPRESSIVE DISORDER WITH SINGLE EPISODE, REMISSION STATUS UNSPECIFIED: ICD-10-CM

## 2024-11-13 DIAGNOSIS — F39 MOOD DISORDER: ICD-10-CM

## 2024-11-13 DIAGNOSIS — K21.9 GERD WITHOUT ESOPHAGITIS: ICD-10-CM

## 2024-11-13 DIAGNOSIS — R11.0 NAUSEA: ICD-10-CM

## 2024-11-13 DIAGNOSIS — M54.40 LOW BACK PAIN WITH SCIATICA, SCIATICA LATERALITY UNSPECIFIED, UNSPECIFIED BACK PAIN LATERALITY, UNSPECIFIED CHRONICITY: ICD-10-CM

## 2024-11-13 DIAGNOSIS — E78.2 MIXED HYPERLIPIDEMIA: ICD-10-CM

## 2024-11-13 DIAGNOSIS — F41.9 ANXIETY: ICD-10-CM

## 2024-11-13 DIAGNOSIS — G47.00 INSOMNIA, UNSPECIFIED TYPE: ICD-10-CM

## 2024-11-13 DIAGNOSIS — K59.00 CONSTIPATION, UNSPECIFIED CONSTIPATION TYPE: ICD-10-CM

## 2024-11-13 DIAGNOSIS — F17.210 CIGARETTE SMOKER: ICD-10-CM

## 2025-02-11 ENCOUNTER — TELEPHONE (OUTPATIENT)
Dept: UROLOGY | Facility: CLINIC | Age: 55
End: 2025-02-11

## 2025-02-11 NOTE — TELEPHONE ENCOUNTER
"Caller: Timbo Mckeon \"Dale\"    Relationship:  Self    Best call back number: [phone]    PATIENT CALLED REQUESTING TO CANCEL SAME DAY APPT.    Did the patient call AFTER the start time of their scheduled appointment?  []YES  [x]NO    Was the patient's appointment rescheduled? [x]YES  []NO    Any additional information: WEATHER/SNOW    "

## 2025-03-03 ENCOUNTER — OFFICE VISIT (OUTPATIENT)
Dept: UROLOGY | Facility: CLINIC | Age: 55
End: 2025-03-03
Payer: COMMERCIAL

## 2025-03-03 VITALS
DIASTOLIC BLOOD PRESSURE: 70 MMHG | TEMPERATURE: 99 F | OXYGEN SATURATION: 97 % | WEIGHT: 280 LBS | BODY MASS INDEX: 39.2 KG/M2 | HEART RATE: 87 BPM | HEIGHT: 71 IN | SYSTOLIC BLOOD PRESSURE: 120 MMHG

## 2025-03-03 DIAGNOSIS — R39.9 LOWER URINARY TRACT SYMPTOMS (LUTS): Primary | ICD-10-CM

## 2025-03-03 DIAGNOSIS — Z71.6 TOBACCO ABUSE COUNSELING: ICD-10-CM

## 2025-03-03 PROBLEM — I10 BENIGN ESSENTIAL HTN: Status: ACTIVE | Noted: 2019-05-11

## 2025-03-03 PROBLEM — Z72.0 TOBACCO ABUSE: Status: ACTIVE | Noted: 2019-05-11

## 2025-03-03 PROBLEM — L03.012 CELLULITIS OF FINGER OF LEFT HAND: Status: ACTIVE | Noted: 2019-05-09

## 2025-03-03 PROBLEM — K21.9 GERD (GASTROESOPHAGEAL REFLUX DISEASE): Status: ACTIVE | Noted: 2022-07-27

## 2025-03-03 PROBLEM — E66.01 MORBID OBESITY WITH BODY MASS INDEX (BMI) OF 40.0 OR HIGHER: Status: ACTIVE | Noted: 2022-07-27

## 2025-03-03 PROBLEM — B18.2 CHRONIC HEPATITIS C WITHOUT HEPATIC COMA: Status: ACTIVE | Noted: 2019-05-10

## 2025-03-03 RX ORDER — LURASIDONE HYDROCHLORIDE 40 MG/1
40 TABLET, FILM COATED ORAL DAILY
COMMUNITY
Start: 2025-02-06

## 2025-03-03 RX ORDER — OMEPRAZOLE 40 MG/1
40 CAPSULE, DELAYED RELEASE ORAL DAILY
COMMUNITY

## 2025-03-03 RX ORDER — HYDROXYZINE PAMOATE 50 MG/1
50 CAPSULE ORAL NIGHTLY PRN
COMMUNITY
Start: 2025-01-30

## 2025-03-03 RX ORDER — TAMSULOSIN HYDROCHLORIDE 0.4 MG/1
1 CAPSULE ORAL DAILY
Qty: 90 CAPSULE | Refills: 1 | Status: SHIPPED | OUTPATIENT
Start: 2025-03-03

## 2025-03-03 RX ORDER — PHENTERMINE HYDROCHLORIDE 37.5 MG/1
37.5 TABLET ORAL
COMMUNITY

## 2025-03-03 RX ORDER — MIRTAZAPINE 30 MG/1
30 TABLET, FILM COATED ORAL
COMMUNITY
Start: 2025-02-05

## 2025-03-03 RX ORDER — ALBUTEROL SULFATE 90 UG/1
1 AEROSOL, METERED RESPIRATORY (INHALATION)
COMMUNITY

## 2025-03-03 RX ORDER — LISDEXAMFETAMINE DIMESYLATE 40 MG/1
40 CAPSULE ORAL EVERY MORNING
COMMUNITY
Start: 2025-01-10

## 2025-03-03 RX ORDER — GABAPENTIN 600 MG/1
1 TABLET ORAL EVERY 6 HOURS
COMMUNITY
Start: 2025-02-21

## 2025-03-03 RX ORDER — MUPIROCIN 20 MG/G
1 OINTMENT TOPICAL 3 TIMES DAILY
COMMUNITY

## 2025-03-03 RX ORDER — ASPIRIN 81 MG/1
81 TABLET, COATED ORAL DAILY
COMMUNITY

## 2025-03-03 RX ORDER — TAMSULOSIN HYDROCHLORIDE 0.4 MG/1
1 CAPSULE ORAL DAILY
COMMUNITY
Start: 2024-12-23 | End: 2025-03-03 | Stop reason: SDUPTHER

## 2025-03-03 NOTE — PROGRESS NOTES
Office Visit     Patient Name: Timbo Mckeon  : 1970   MRN: [id number]   Patient or patient representative verbalized consent for the use of Ambient Listening during the visit with  ENOCH Campbell for chart documentation. 3/3/2025  10:03 EST    No chief complaint on file.    Referring Provider: Yadira Morrissey APRN    Primary Care Provider: Charlotte Wan APRN     History of Present Illness  The patient is a 55-year-old male with a history of lower urinary tract symptoms     Lower Urinary Tract Symptoms  - Incomplete emptying  - Nocturia up to four times per night  - Started Flomax one month ago  - Suspected due to prostate swelling  - Reports significant symptom relief  - Denies UTI symptoms.     Bowel Movements  - Regular bowel movements managed with MiraLAX    SOCIAL HISTORY  Smoker.    FAMILY HISTORY  No family history of prostate, bladder, or kidney cancer.     IPSS Questionnaire (AUA-7):  Incomplete emptying  Over the past month, how often have you had a sensation of not emptying your bladder completely after you finished urinating?: About half the time (25)  Frequency  Over the past month, how often have you had to urinate again less than two hours after you finishied urinating ?: Less than 1 time in 5 (25)  Intermittency  Over the past month, how often have you found you stopped and started again several time when you urinated ?: Less than 1 time in 5 (25)  Urgency  Over the last month, how often have you found it difficult  have you found it difficult to postpone urination ?: Not at all (25)  Weak Stream  Over the past month, how often have you had a weak urinary stream ?: Less than 1 time in 5 (25)  Straining  Over the past month, how often have you had to push or strain to begin urination ?: Not at all (25)  Nocturia  Over the past month, how many times did you most typically get up to urinate from the time you  went to bed until the time you got up in the morning ?: 4 times (03/03/25 1003)  Quality of life due to urinary symptoms  If you were to spend the rest of your life with your urinary condition the way it is now, how would feel about that?: Mixed - about equally satisfied (03/03/25 1003)    Scores  Total IPSS Score: (!) 10 (03/03/25 1003)  Total Score = Symptomatic Level: Moderately symptomatic: 8-19 (03/03/25 1003)     Subjective   Review of System: As noted in HPI.    Past Medical History:   Diagnosis Date    Chronic back pain     COPD (chronic obstructive pulmonary disease)     Hypertension     Seizures     isolated episode in 2019    Snores     Spider bite     Tattoos      Past Surgical History:   Procedure Laterality Date    ANKLE SURGERY Right     BACK SURGERY  2006    FOOT SURGERY Right     HAND SURGERY      due to spider bite    SPINE SURGERY       Family History   Problem Relation Age of Onset    Diabetes Father     Hypertension Father     Cancer Mother     Colon cancer Neg Hx     Cirrhosis Neg Hx     Liver cancer Neg Hx     Liver disease Neg Hx     Prostate cancer Neg Hx     Kidney cancer Neg Hx      Social History     Socioeconomic History    Marital status: Single   Tobacco Use    Smoking status: Every Day     Current packs/day: 1.50     Average packs/day: 1.5 packs/day for 22.2 years (33.3 ttl pk-yrs)     Types: Cigarettes     Start date: 2003     Passive exposure: Past    Smokeless tobacco: Never   Vaping Use    Vaping status: Never Used   Substance and Sexual Activity    Alcohol use: No    Drug use: Not Currently    Sexual activity: Defer       Current Outpatient Medications:     Aspirin Low Dose 81 MG EC tablet, Take 1 tablet by mouth Daily., Disp: , Rfl:     atenolol (TENORMIN) 50 MG tablet, Take 1 tablet by mouth Daily., Disp: , Rfl:     bisacodyl (Dulcolax) 5 MG EC tablet, Follow instructions given at office, Disp: 4 tablet, Rfl: 0    buprenorphine-naloxone (SUBOXONE) 8-2 MG per SL tablet, Place 2  tablets under the tongue Daily., Disp: , Rfl:     chlorthalidone (HYGROTEN) 50 MG tablet, Take 1 tablet by mouth Daily., Disp: , Rfl:     Cholecalciferol 50 MCG (2000 UT) capsule, Take 1 capsule by mouth Daily., Disp: , Rfl:     esomeprazole (nexIUM) 40 MG capsule, Take 1 capsule by mouth Before Breakfast. Needs follow up visit in office for further refills., Disp: 30 capsule, Rfl: 0    Fluticasone Furoate-Vilanterol (Breo Ellipta) 200-25 MCG/INH inhaler, Inhale 1 puff Daily., Disp: , Rfl:     gabapentin (NEURONTIN) 600 MG tablet, Take 1 tablet by mouth Every 6 (Six) Hours., Disp: , Rfl:     hydrOXYzine pamoate (VISTARIL) 50 MG capsule, Take 1 capsule by mouth At Night As Needed., Disp: , Rfl:     ibuprofen (ADVIL,MOTRIN) 800 MG tablet, Take 1 tablet by mouth 3 (Three) Times a Day As Needed for Mild Pain., Disp: , Rfl:     lurasidone (LATUDA) 40 MG tablet tablet, Take 1 tablet by mouth Daily., Disp: , Rfl:     mirtazapine (REMERON) 30 MG tablet, Take 1 tablet by mouth every night at bedtime., Disp: , Rfl:     mupirocin (BACTROBAN) 2 % ointment, Apply 1 Application topically to the appropriate area as directed 3 (Three) Times a Day., Disp: , Rfl:     Narcan 4 MG/0.1ML nasal spray, , Disp: , Rfl:     omeprazole (priLOSEC) 40 MG capsule, Take 1 capsule by mouth Daily., Disp: , Rfl:     ondansetron (ZOFRAN) 4 MG tablet, , Disp: , Rfl:     phentermine (ADIPEX-P) 37.5 MG tablet, Take 1 tablet by mouth Every Morning Before Breakfast., Disp: , Rfl:     polyethylene glycol (MiraLax) 17 g packet, Take 17 g by mouth Daily., Disp: , Rfl:     polyethylene glycol (MIRALAX) 17 GM/SCOOP powder, Follow directions given at office, Disp: 238 g, Rfl: 0    potassium chloride 10 MEQ CR tablet, Take 1 tablet by mouth 2 (Two) Times a Day., Disp: 14 tablet, Rfl: 0    tamsulosin (FLOMAX) 0.4 MG capsule 24 hr capsule, Take 1 capsule by mouth Daily., Disp: 90 capsule, Rfl: 1    Ventolin  (90 Base) MCG/ACT inhaler, Inhale 1 puff 4 (Four)  "Times a Day., Disp: , Rfl:     vitamin D (ERGOCALCIFEROL) 1.25 MG (89940 UT) capsule capsule, Take 1 capsule by mouth Every 7 (Seven) Days., Disp: , Rfl:     Vitamin D, Ergocalciferol, 50 MCG (2000 UT) capsule, Take  by mouth., Disp: , Rfl:     Vyvanse 40 MG capsule, Take 1 capsule by mouth Every Morning, Disp: , Rfl:     DULERA 100-5 MCG/ACT inhaler, , Disp: , Rfl:     No Known Allergies  Objective   Visit Vitals  /70   Pulse 87   Temp 99 °F (37.2 °C)   Ht 180.3 cm (71\")   Wt 127 kg (280 lb)   SpO2 97%   BMI 39.05 kg/m²      Body mass index is 39.05 kg/m².   Physical Exam  Vitals and nursing note reviewed.   Constitutional:       General: He is not in acute distress.     Appearance: Normal appearance. He is obese. He is not ill-appearing.   HENT:      Head: Normocephalic and atraumatic.   Pulmonary:      Effort: Pulmonary effort is normal.   Musculoskeletal:      Left hand: Swelling (at baseline) present.   Skin:     General: Skin is warm and dry.   Neurological:      General: No focal deficit present.      Mental Status: He is alert and oriented to person, place, and time.   Psychiatric:         Mood and Affect: Mood normal.         Behavior: Behavior normal.     Labs  Lab Results   Component Value Date    COLORU Yellow 07/19/2021    CLARITYU Clear 07/19/2021    SPECGRAV 1.010 07/19/2021    PHUR 7.5 07/19/2021    LEUKOCYTESUR Negative 07/19/2021    NITRITE Negative 07/19/2021    PROTEINPOCUA Negative 07/19/2021    GLUCOSEUR Negative 07/19/2021    KETONESU Negative 07/19/2021    UROBILINOGEN Normal 07/19/2021    BILIRUBINUR Negative 07/19/2021    RBCUR Negative 07/19/2021      Lab Results   Component Value Date    WBCUA 0-2 (A) 09/06/2019    RBCUA None Seen 09/06/2019    BACTERIA Trace (A) 09/06/2019    HYALCASTU 0-2 09/06/2019    SQUAMEPIUA 0-2 09/06/2019        Lab Results   Component Value Date    WBC 13.87 (H) 02/22/2023    HGB 16.9 02/22/2023    HCT 47.8 02/22/2023    MCV 83.3 02/22/2023     " 02/22/2023     Lab Results   Component Value Date    GLUCOSE 120 (H) 05/24/2023    CALCIUM 9.1 05/24/2023     05/24/2023    K 3.1 (L) 05/24/2023    CO2 26.0 05/24/2023    CL 96 (L) 05/24/2023    BUN 9 05/24/2023    BUN 9 02/22/2023    CREATININE 0.80 05/24/2023    CREATININE 0.72 (L) 02/22/2023    EGFR 107.1 05/24/2023    EGFR 110.6 02/22/2023    BCR 11.3 05/24/2023    ANIONGAP 15.0 05/24/2023    ALT 20 05/24/2023    AST 24 05/24/2023     Lab Results   Component Value Date    ODZX11VM 46.7 02/22/2023       Radiographic Studies  No Images in the past 120 days found..    I have reviewed the above labs and imaging.     PVR  Post-void residual performed with ultrasound by staff and interpreted by me - [facility]    Assessment / Plan    Diagnoses and all orders for this visit:    1. Lower urinary tract symptoms (LUTS) (Primary)  -     tamsulosin (FLOMAX) 0.4 MG capsule 24 hr capsule; Take 1 capsule by mouth Daily.  Dispense: 90 capsule; Refill: 1    2. Tobacco abuse counseling       Assessment & Plan  1. Lower urinary tract symptoms  - Significant improvement with Flomax  - No comprehensive BPH workup needed at this time.   - Continue Flomax for 6 months  - Discussed potential side effects  - Regular bowel movements with MiraLAX as needed  - Continue bowel management to prevent impact on urinary symptoms    2. Tobacco abuse  - encouraged cessation of tobacco use.  He is not ready to quit at this time.      Return in about 6 months (around 9/3/2025) for f/u with Maribell to discuss discontinuation of Flomax at that time. .    Maribell Sanford, MSN, APRN, FNP-C  [facility] [place]

## 2025-07-22 DIAGNOSIS — R39.9 LOWER URINARY TRACT SYMPTOMS (LUTS): ICD-10-CM

## 2025-07-22 RX ORDER — TAMSULOSIN HYDROCHLORIDE 0.4 MG/1
1 CAPSULE ORAL DAILY
Qty: 30 CAPSULE | Refills: 0 | Status: SHIPPED | OUTPATIENT
Start: 2025-07-22